# Patient Record
Sex: FEMALE | Race: WHITE | NOT HISPANIC OR LATINO | Employment: OTHER | URBAN - METROPOLITAN AREA
[De-identification: names, ages, dates, MRNs, and addresses within clinical notes are randomized per-mention and may not be internally consistent; named-entity substitution may affect disease eponyms.]

---

## 2020-01-01 ENCOUNTER — APPOINTMENT (INPATIENT)
Dept: ULTRASOUND IMAGING | Facility: HOSPITAL | Age: 75
DRG: 291 | End: 2020-01-01
Payer: MEDICARE

## 2020-01-01 ENCOUNTER — PATIENT OUTREACH (OUTPATIENT)
Dept: CASE MANAGEMENT | Facility: OTHER | Age: 75
End: 2020-01-01

## 2020-01-01 ENCOUNTER — TELEPHONE (OUTPATIENT)
Dept: HEMATOLOGY ONCOLOGY | Facility: CLINIC | Age: 75
End: 2020-01-01

## 2020-01-01 ENCOUNTER — HOSPITAL ENCOUNTER (INPATIENT)
Facility: HOSPITAL | Age: 75
LOS: 8 days | Discharge: HOME HEALTH | DRG: 280 | End: 2020-05-24
Attending: EMERGENCY MEDICINE | Admitting: STUDENT IN AN ORGANIZED HEALTH CARE EDUCATION/TRAINING PROGRAM
Payer: MEDICARE

## 2020-01-01 ENCOUNTER — APPOINTMENT (EMERGENCY)
Dept: RADIOLOGY | Facility: HOSPITAL | Age: 75
End: 2020-01-01
Payer: MEDICARE

## 2020-01-01 ENCOUNTER — APPOINTMENT (INPATIENT)
Dept: NON INVASIVE DIAGNOSTICS | Facility: HOSPITAL | Age: 75
DRG: 291 | End: 2020-01-01
Payer: MEDICARE

## 2020-01-01 ENCOUNTER — APPOINTMENT (INPATIENT)
Dept: RADIOLOGY | Facility: HOSPITAL | Age: 75
DRG: 291 | End: 2020-01-01
Payer: MEDICARE

## 2020-01-01 ENCOUNTER — APPOINTMENT (EMERGENCY)
Dept: RADIOLOGY | Facility: HOSPITAL | Age: 75
DRG: 291 | End: 2020-01-01
Payer: MEDICARE

## 2020-01-01 ENCOUNTER — PATIENT OUTREACH (OUTPATIENT)
Dept: CASE MANAGEMENT | Facility: HOSPITAL | Age: 75
End: 2020-01-01

## 2020-01-01 ENCOUNTER — APPOINTMENT (INPATIENT)
Dept: CT IMAGING | Facility: HOSPITAL | Age: 75
DRG: 291 | End: 2020-01-01
Payer: MEDICARE

## 2020-01-01 ENCOUNTER — APPOINTMENT (EMERGENCY)
Dept: RADIOLOGY | Facility: HOSPITAL | Age: 75
DRG: 280 | End: 2020-01-01
Payer: MEDICARE

## 2020-01-01 ENCOUNTER — APPOINTMENT (EMERGENCY)
Dept: CT IMAGING | Facility: HOSPITAL | Age: 75
DRG: 280 | End: 2020-01-01
Payer: MEDICARE

## 2020-01-01 ENCOUNTER — APPOINTMENT (OUTPATIENT)
Dept: RADIOLOGY | Facility: HOSPITAL | Age: 75
End: 2020-01-01
Payer: MEDICARE

## 2020-01-01 ENCOUNTER — TELEPHONE (OUTPATIENT)
Dept: FAMILY MEDICINE CLINIC | Facility: CLINIC | Age: 75
End: 2020-01-01

## 2020-01-01 ENCOUNTER — APPOINTMENT (INPATIENT)
Dept: RADIOLOGY | Facility: HOSPITAL | Age: 75
DRG: 280 | End: 2020-01-01
Payer: MEDICARE

## 2020-01-01 ENCOUNTER — APPOINTMENT (INPATIENT)
Dept: NON INVASIVE DIAGNOSTICS | Facility: HOSPITAL | Age: 75
DRG: 280 | End: 2020-01-01
Payer: MEDICARE

## 2020-01-01 ENCOUNTER — HOSPITAL ENCOUNTER (INPATIENT)
Facility: HOSPITAL | Age: 75
LOS: 11 days | DRG: 291 | End: 2020-07-15
Attending: EMERGENCY MEDICINE | Admitting: HOSPITALIST
Payer: MEDICARE

## 2020-01-01 ENCOUNTER — HOSPITAL ENCOUNTER (INPATIENT)
Facility: HOSPITAL | Age: 75
LOS: 15 days | Discharge: HOME HEALTH | DRG: 291 | End: 2020-03-31
Attending: FAMILY MEDICINE | Admitting: FAMILY MEDICINE
Payer: MEDICARE

## 2020-01-01 ENCOUNTER — HOSPITAL ENCOUNTER (EMERGENCY)
Facility: HOSPITAL | Age: 75
Discharge: HOME | End: 2020-04-26
Attending: EMERGENCY MEDICINE | Admitting: EMERGENCY MEDICINE
Payer: MEDICARE

## 2020-01-01 ENCOUNTER — HOSPITAL ENCOUNTER (INPATIENT)
Facility: HOSPITAL | Age: 75
LOS: 6 days | Discharge: NURSING, REHAB OR LONG-TERM CARE | DRG: 291 | End: 2020-03-16
Attending: EMERGENCY MEDICINE | Admitting: FAMILY MEDICINE
Payer: MEDICARE

## 2020-01-01 VITALS
RESPIRATION RATE: 18 BRPM | DIASTOLIC BLOOD PRESSURE: 59 MMHG | TEMPERATURE: 97.2 F | WEIGHT: 243.61 LBS | TEMPERATURE: 97.2 F | SYSTOLIC BLOOD PRESSURE: 130 MMHG | SYSTOLIC BLOOD PRESSURE: 136 MMHG | OXYGEN SATURATION: 95 % | DIASTOLIC BLOOD PRESSURE: 72 MMHG | BODY MASS INDEX: 47.7 KG/M2 | HEIGHT: 61 IN | BODY MASS INDEX: 44.83 KG/M2 | HEART RATE: 80 BPM | RESPIRATION RATE: 19 BRPM | WEIGHT: 252.65 LBS | OXYGEN SATURATION: 92 % | HEART RATE: 59 BPM | HEIGHT: 62 IN

## 2020-01-01 VITALS
WEIGHT: 269.84 LBS | HEART RATE: 84 BPM | DIASTOLIC BLOOD PRESSURE: 65 MMHG | HEIGHT: 61 IN | RESPIRATION RATE: 23 BRPM | OXYGEN SATURATION: 89 % | SYSTOLIC BLOOD PRESSURE: 139 MMHG | BODY MASS INDEX: 50.95 KG/M2 | TEMPERATURE: 97.8 F

## 2020-01-01 VITALS
WEIGHT: 243.7 LBS | OXYGEN SATURATION: 97 % | BODY MASS INDEX: 44.57 KG/M2 | TEMPERATURE: 98.4 F | DIASTOLIC BLOOD PRESSURE: 71 MMHG | HEART RATE: 89 BPM | RESPIRATION RATE: 18 BRPM | SYSTOLIC BLOOD PRESSURE: 146 MMHG

## 2020-01-01 VITALS
DIASTOLIC BLOOD PRESSURE: 60 MMHG | TEMPERATURE: 96.5 F | RESPIRATION RATE: 16 BRPM | HEIGHT: 62 IN | SYSTOLIC BLOOD PRESSURE: 132 MMHG | HEART RATE: 65 BPM | OXYGEN SATURATION: 95 % | BODY MASS INDEX: 43.86 KG/M2 | WEIGHT: 238.32 LBS

## 2020-01-01 DIAGNOSIS — I50.9 CHF (CONGESTIVE HEART FAILURE) (HCC): Primary | ICD-10-CM

## 2020-01-01 DIAGNOSIS — I95.9 HYPOTENSION: ICD-10-CM

## 2020-01-01 DIAGNOSIS — R09.02 HYPOXIA: ICD-10-CM

## 2020-01-01 DIAGNOSIS — E87.2 LACTIC ACIDOSIS: ICD-10-CM

## 2020-01-01 DIAGNOSIS — J96.22 ACUTE ON CHRONIC RESPIRATORY FAILURE WITH HYPOXIA AND HYPERCAPNIA (HCC): ICD-10-CM

## 2020-01-01 DIAGNOSIS — D69.6 THROMBOCYTOPENIA (HCC): ICD-10-CM

## 2020-01-01 DIAGNOSIS — I50.9 ACUTE EXACERBATION OF CHF (CONGESTIVE HEART FAILURE) (HCC): ICD-10-CM

## 2020-01-01 DIAGNOSIS — R65.10 SIRS (SYSTEMIC INFLAMMATORY RESPONSE SYNDROME) (HCC): ICD-10-CM

## 2020-01-01 DIAGNOSIS — I50.31 ACUTE DIASTOLIC HEART FAILURE (HCC): ICD-10-CM

## 2020-01-01 DIAGNOSIS — M79.605 BILATERAL LEG PAIN: Primary | ICD-10-CM

## 2020-01-01 DIAGNOSIS — R77.8 ELEVATED TROPONIN I LEVEL: ICD-10-CM

## 2020-01-01 DIAGNOSIS — R06.03 ACUTE RESPIRATORY DISTRESS: Primary | ICD-10-CM

## 2020-01-01 DIAGNOSIS — J96.21 ACUTE ON CHRONIC RESPIRATORY FAILURE WITH HYPOXIA AND HYPERCAPNIA (HCC): ICD-10-CM

## 2020-01-01 DIAGNOSIS — I50.33 ACUTE ON CHRONIC DIASTOLIC HEART FAILURE (HCC): ICD-10-CM

## 2020-01-01 DIAGNOSIS — I50.9 ACUTE CHF (CONGESTIVE HEART FAILURE) (HCC): ICD-10-CM

## 2020-01-01 DIAGNOSIS — J96.21 ACUTE ON CHRONIC RESPIRATORY FAILURE WITH HYPOXIA (HCC): ICD-10-CM

## 2020-01-01 DIAGNOSIS — E88.09 HYPOALBUMINEMIA: ICD-10-CM

## 2020-01-01 DIAGNOSIS — M79.604 BILATERAL LEG PAIN: Primary | ICD-10-CM

## 2020-01-01 DIAGNOSIS — K76.9 LIVER DISEASE: ICD-10-CM

## 2020-01-01 DIAGNOSIS — E53.8 VITAMIN B12 DEFICIENCY: ICD-10-CM

## 2020-01-01 DIAGNOSIS — R57.9 SHOCK (HCC): ICD-10-CM

## 2020-01-01 DIAGNOSIS — N18.9 CKD (CHRONIC KIDNEY DISEASE): ICD-10-CM

## 2020-01-01 DIAGNOSIS — L60.2 ONYCHOGRYPHOSIS: Primary | ICD-10-CM

## 2020-01-01 DIAGNOSIS — J90 PLEURAL EFFUSION: ICD-10-CM

## 2020-01-01 DIAGNOSIS — R06.02 SOB (SHORTNESS OF BREATH): ICD-10-CM

## 2020-01-01 DIAGNOSIS — D64.9 ACUTE ANEMIA: ICD-10-CM

## 2020-01-01 DIAGNOSIS — R77.8 ELEVATED TROPONIN: ICD-10-CM

## 2020-01-01 DIAGNOSIS — I48.91 ATRIAL FIBRILLATION (HCC): Primary | ICD-10-CM

## 2020-01-01 DIAGNOSIS — I50.33 ACUTE ON CHRONIC DIASTOLIC HEART FAILURE (HCC): Primary | ICD-10-CM

## 2020-01-01 LAB
ABO GROUP BLD BPU: NORMAL
ABO GROUP BLD: NORMAL
ABO GROUP BLD: NORMAL
AFP-TM SERPL-MCNC: 2.6 NG/ML (ref 0.5–8)
ALBUMIN SERPL BCP-MCNC: 2.2 G/DL (ref 3.5–5)
ALBUMIN SERPL BCP-MCNC: 2.4 G/DL (ref 3.5–5)
ALBUMIN SERPL BCP-MCNC: 2.6 G/DL (ref 3.5–5)
ALBUMIN SERPL BCP-MCNC: 2.7 G/DL (ref 3–5.2)
ALBUMIN SERPL BCP-MCNC: 2.8 G/DL (ref 3–5.2)
ALBUMIN SERPL BCP-MCNC: 2.8 G/DL (ref 3–5.2)
ALBUMIN SERPL BCP-MCNC: 2.9 G/DL (ref 3–5.2)
ALBUMIN SERPL BCP-MCNC: 3.1 G/DL (ref 3–5.2)
ALBUMIN SERPL BCP-MCNC: 3.2 G/DL (ref 3–5.2)
ALBUMIN SERPL ELPH-MCNC: 2.94 G/DL (ref 3.5–5)
ALBUMIN SERPL ELPH-MCNC: 51.6 % (ref 52–65)
ALP SERPL-CCNC: 105 U/L (ref 43–122)
ALP SERPL-CCNC: 116 U/L (ref 46–116)
ALP SERPL-CCNC: 122 U/L (ref 46–116)
ALP SERPL-CCNC: 124 U/L (ref 43–122)
ALP SERPL-CCNC: 124 U/L (ref 43–122)
ALP SERPL-CCNC: 127 U/L (ref 43–122)
ALP SERPL-CCNC: 130 U/L (ref 46–116)
ALP SERPL-CCNC: 161 U/L (ref 43–122)
ALP SERPL-CCNC: 166 U/L (ref 46–116)
ALP SERPL-CCNC: 170 U/L (ref 46–116)
ALPHA1 GLOB SERPL ELPH-MCNC: 0.31 G/DL (ref 0.1–0.4)
ALPHA1 GLOB SERPL ELPH-MCNC: 5.5 % (ref 2.5–5)
ALPHA2 GLOB SERPL ELPH-MCNC: 0.46 G/DL (ref 0.4–1.2)
ALPHA2 GLOB SERPL ELPH-MCNC: 8.1 % (ref 7–13)
ALT SERPL W P-5'-P-CCNC: 16 U/L (ref 12–78)
ALT SERPL W P-5'-P-CCNC: 19 U/L (ref 12–78)
ALT SERPL W P-5'-P-CCNC: 21 U/L (ref 9–52)
ALT SERPL W P-5'-P-CCNC: 28 U/L (ref 12–78)
ALT SERPL W P-5'-P-CCNC: 30 U/L (ref 9–52)
ALT SERPL W P-5'-P-CCNC: 31 U/L (ref 12–78)
ALT SERPL W P-5'-P-CCNC: 32 U/L (ref 9–52)
ALT SERPL W P-5'-P-CCNC: 34 U/L (ref 9–52)
ALT SERPL W P-5'-P-CCNC: 38 U/L (ref 9–52)
ALT SERPL W P-5'-P-CCNC: 8 U/L (ref 12–78)
ANION GAP SERPL CALCULATED.3IONS-SCNC: -1 MMOL/L (ref 4–13)
ANION GAP SERPL CALCULATED.3IONS-SCNC: 1 MMOL/L (ref 4–13)
ANION GAP SERPL CALCULATED.3IONS-SCNC: 2 MMOL/L (ref 4–13)
ANION GAP SERPL CALCULATED.3IONS-SCNC: 2 MMOL/L (ref 4–13)
ANION GAP SERPL CALCULATED.3IONS-SCNC: 2 MMOL/L (ref 5–14)
ANION GAP SERPL CALCULATED.3IONS-SCNC: 3 MMOL/L (ref 4–13)
ANION GAP SERPL CALCULATED.3IONS-SCNC: 3 MMOL/L (ref 5–14)
ANION GAP SERPL CALCULATED.3IONS-SCNC: 4 MMOL/L (ref 4–13)
ANION GAP SERPL CALCULATED.3IONS-SCNC: 4 MMOL/L (ref 5–14)
ANION GAP SERPL CALCULATED.3IONS-SCNC: 4 MMOL/L (ref 5–14)
ANION GAP SERPL CALCULATED.3IONS-SCNC: 5 MMOL/L (ref 4–13)
ANION GAP SERPL CALCULATED.3IONS-SCNC: 5 MMOL/L (ref 4–13)
ANION GAP SERPL CALCULATED.3IONS-SCNC: 5 MMOL/L (ref 5–14)
ANION GAP SERPL CALCULATED.3IONS-SCNC: 6 MMOL/L (ref 4–13)
ANION GAP SERPL CALCULATED.3IONS-SCNC: 6 MMOL/L (ref 5–14)
ANION GAP SERPL CALCULATED.3IONS-SCNC: 6 MMOL/L (ref 5–14)
ANION GAP SERPL CALCULATED.3IONS-SCNC: 7 MMOL/L (ref 4–13)
ANION GAP SERPL CALCULATED.3IONS-SCNC: 8 MMOL/L (ref 5–14)
ANISOCYTOSIS BLD QL SMEAR: PRESENT
ANISOCYTOSIS BLD QL SMEAR: PRESENT
APTT PPP: 30 SECONDS (ref 23–37)
APTT PPP: 34 SECONDS (ref 23–37)
ARTERIAL PATENCY WRIST A: YES
ARTERIAL PATENCY WRIST A: YES
AST SERPL W P-5'-P-CCNC: 21 U/L (ref 5–45)
AST SERPL W P-5'-P-CCNC: 22 U/L (ref 5–45)
AST SERPL W P-5'-P-CCNC: 35 U/L (ref 14–36)
AST SERPL W P-5'-P-CCNC: 38 U/L (ref 5–45)
AST SERPL W P-5'-P-CCNC: 40 U/L (ref 14–36)
AST SERPL W P-5'-P-CCNC: 40 U/L (ref 14–36)
AST SERPL W P-5'-P-CCNC: 48 U/L (ref 14–36)
AST SERPL W P-5'-P-CCNC: 48 U/L (ref 5–45)
AST SERPL W P-5'-P-CCNC: 70 U/L (ref 14–36)
AST SERPL W P-5'-P-CCNC: 76 U/L (ref 5–45)
ATRIAL RATE: 104 BPM
ATRIAL RATE: 107 BPM
ATRIAL RATE: 111 BPM
ATRIAL RATE: 170 BPM
ATRIAL RATE: 326 BPM
ATRIAL RATE: 82 BPM
ATRIAL RATE: 85 BPM
ATRIAL RATE: 86 BPM
ATRIAL RATE: 87 BPM
ATRIAL RATE: 90 BPM
B2 MICROGLOB SERPL-MCNC: 4.5 MG/L (ref 0.6–2.4)
BACTERIA BLD CULT: NORMAL
BACTERIA UR CULT: ABNORMAL
BACTERIA UR QL AUTO: ABNORMAL /HPF
BASE EX.OXY STD BLDV CALC-SCNC: 91.9 %
BASE EXCESS BLDA CALC-SCNC: 10.5 MMOL/L
BASE EXCESS BLDA CALC-SCNC: 7 MMOL/L
BASE EXCESS BLDV CALC-SCNC: 16.6 MMOL/L (ref -2.1–2.1)
BASOPHILS # BLD AUTO: 0 THOUSANDS/ΜL (ref 0–0.1)
BASOPHILS # BLD AUTO: 0.02 THOUSANDS/ΜL (ref 0–0.1)
BASOPHILS # BLD AUTO: 0.03 THOUSANDS/ΜL (ref 0–0.1)
BASOPHILS # BLD AUTO: 0.07 THOUSANDS/ΜL (ref 0–0.1)
BASOPHILS # BLD AUTO: 0.1 THOUSANDS/ΜL (ref 0–0.1)
BASOPHILS # BLD AUTO: 0.1 THOUSANDS/ΜL (ref 0–0.1)
BASOPHILS NFR BLD AUTO: 0 % (ref 0–1)
BASOPHILS NFR BLD AUTO: 1 % (ref 0–1)
BETA GLOB ABNORMAL SERPL ELPH-MCNC: 0.35 G/DL (ref 0.4–0.8)
BETA1 GLOB SERPL ELPH-MCNC: 6.1 % (ref 5–13)
BETA2 GLOB SERPL ELPH-MCNC: 5.7 % (ref 2–8)
BETA2+GAMMA GLOB SERPL ELPH-MCNC: 0.32 G/DL (ref 0.2–0.5)
BILIRUB SERPL-MCNC: 0.72 MG/DL (ref 0.2–1)
BILIRUB SERPL-MCNC: 0.8 MG/DL
BILIRUB SERPL-MCNC: 0.95 MG/DL (ref 0.2–1)
BILIRUB SERPL-MCNC: 1.14 MG/DL (ref 0.2–1)
BILIRUB SERPL-MCNC: 1.2 MG/DL
BILIRUB SERPL-MCNC: 1.24 MG/DL (ref 0.2–1)
BILIRUB SERPL-MCNC: 1.3 MG/DL
BILIRUB SERPL-MCNC: 1.4 MG/DL
BILIRUB SERPL-MCNC: 1.5 MG/DL
BILIRUB SERPL-MCNC: 1.63 MG/DL (ref 0.2–1)
BILIRUB UR QL STRIP: NEGATIVE
BLD GP AB SCN SERPL QL: NEGATIVE
BLD SMEAR INTERP: NORMAL
BPU ID: NORMAL
BUN SERPL-MCNC: 13 MG/DL (ref 5–25)
BUN SERPL-MCNC: 13 MG/DL (ref 5–25)
BUN SERPL-MCNC: 14 MG/DL (ref 5–25)
BUN SERPL-MCNC: 14 MG/DL (ref 5–25)
BUN SERPL-MCNC: 15 MG/DL (ref 5–25)
BUN SERPL-MCNC: 16 MG/DL (ref 5–25)
BUN SERPL-MCNC: 17 MG/DL (ref 5–25)
BUN SERPL-MCNC: 18 MG/DL (ref 5–25)
BUN SERPL-MCNC: 19 MG/DL (ref 5–25)
BUN SERPL-MCNC: 21 MG/DL (ref 5–25)
BUN SERPL-MCNC: 22 MG/DL (ref 5–25)
BUN SERPL-MCNC: 23 MG/DL (ref 5–25)
BUN SERPL-MCNC: 24 MG/DL (ref 5–25)
BUN SERPL-MCNC: 24 MG/DL (ref 5–25)
BUN SERPL-MCNC: 25 MG/DL (ref 5–25)
BUN SERPL-MCNC: 26 MG/DL (ref 5–25)
BUN SERPL-MCNC: 27 MG/DL (ref 5–25)
BUN SERPL-MCNC: 27 MG/DL (ref 5–25)
BUN SERPL-MCNC: 28 MG/DL (ref 5–25)
BUN SERPL-MCNC: 29 MG/DL (ref 5–25)
BUN SERPL-MCNC: 30 MG/DL (ref 5–25)
BUN SERPL-MCNC: 31 MG/DL (ref 5–25)
BUN SERPL-MCNC: 36 MG/DL (ref 5–25)
CALCIUM SERPL-MCNC: 8.3 MG/DL (ref 8.3–10.1)
CALCIUM SERPL-MCNC: 8.3 MG/DL (ref 8.3–10.1)
CALCIUM SERPL-MCNC: 8.5 MG/DL (ref 8.3–10.1)
CALCIUM SERPL-MCNC: 8.5 MG/DL (ref 8.3–10.1)
CALCIUM SERPL-MCNC: 8.6 MG/DL (ref 8.3–10.1)
CALCIUM SERPL-MCNC: 8.6 MG/DL (ref 8.3–10.1)
CALCIUM SERPL-MCNC: 8.7 MG/DL (ref 8.3–10.1)
CALCIUM SERPL-MCNC: 8.7 MG/DL (ref 8.4–10.2)
CALCIUM SERPL-MCNC: 8.8 MG/DL (ref 8.3–10.1)
CALCIUM SERPL-MCNC: 8.9 MG/DL (ref 8.3–10.1)
CALCIUM SERPL-MCNC: 8.9 MG/DL (ref 8.4–10.2)
CALCIUM SERPL-MCNC: 9 MG/DL (ref 8.4–10.2)
CALCIUM SERPL-MCNC: 9 MG/DL (ref 8.4–10.2)
CALCIUM SERPL-MCNC: 9.1 MG/DL (ref 8.3–10.1)
CALCIUM SERPL-MCNC: 9.1 MG/DL (ref 8.3–10.1)
CALCIUM SERPL-MCNC: 9.1 MG/DL (ref 8.4–10.2)
CALCIUM SERPL-MCNC: 9.1 MG/DL (ref 8.4–10.2)
CALCIUM SERPL-MCNC: 9.2 MG/DL (ref 8.4–10.2)
CALCIUM SERPL-MCNC: 9.3 MG/DL (ref 8.3–10.1)
CALCIUM SERPL-MCNC: 9.3 MG/DL (ref 8.4–10.2)
CALCIUM SERPL-MCNC: 9.3 MG/DL (ref 8.4–10.2)
CALCIUM SERPL-MCNC: 9.4 MG/DL (ref 8.4–10.2)
CALCIUM SERPL-MCNC: 9.6 MG/DL (ref 8.3–10.1)
CALCIUM SERPL-MCNC: 9.7 MG/DL (ref 8.3–10.1)
CCP IGA+IGG SERPL IA-ACNC: 6 UNITS (ref 0–19)
CHLORIDE SERPL-SCNC: 101 MMOL/L (ref 100–108)
CHLORIDE SERPL-SCNC: 101 MMOL/L (ref 100–108)
CHLORIDE SERPL-SCNC: 101 MMOL/L (ref 97–108)
CHLORIDE SERPL-SCNC: 102 MMOL/L (ref 100–108)
CHLORIDE SERPL-SCNC: 102 MMOL/L (ref 97–108)
CHLORIDE SERPL-SCNC: 102 MMOL/L (ref 97–108)
CHLORIDE SERPL-SCNC: 103 MMOL/L (ref 100–108)
CHLORIDE SERPL-SCNC: 103 MMOL/L (ref 97–108)
CHLORIDE SERPL-SCNC: 104 MMOL/L (ref 100–108)
CHLORIDE SERPL-SCNC: 105 MMOL/L (ref 100–108)
CHLORIDE SERPL-SCNC: 106 MMOL/L (ref 100–108)
CHLORIDE SERPL-SCNC: 107 MMOL/L (ref 100–108)
CHLORIDE SERPL-SCNC: 108 MMOL/L (ref 100–108)
CHLORIDE SERPL-SCNC: 109 MMOL/L (ref 100–108)
CHLORIDE SERPL-SCNC: 94 MMOL/L (ref 97–108)
CHLORIDE SERPL-SCNC: 95 MMOL/L (ref 97–108)
CHLORIDE SERPL-SCNC: 96 MMOL/L (ref 97–108)
CHLORIDE SERPL-SCNC: 97 MMOL/L (ref 97–108)
CHLORIDE SERPL-SCNC: 99 MMOL/L (ref 97–108)
CHOLEST SERPL-MCNC: 130 MG/DL
CLARITY UR: ABNORMAL
CO2 SERPL-SCNC: 26 MMOL/L (ref 21–32)
CO2 SERPL-SCNC: 27 MMOL/L (ref 22–30)
CO2 SERPL-SCNC: 29 MMOL/L (ref 21–32)
CO2 SERPL-SCNC: 29 MMOL/L (ref 21–32)
CO2 SERPL-SCNC: 29 MMOL/L (ref 22–30)
CO2 SERPL-SCNC: 30 MMOL/L (ref 22–30)
CO2 SERPL-SCNC: 30 MMOL/L (ref 22–30)
CO2 SERPL-SCNC: 31 MMOL/L (ref 21–32)
CO2 SERPL-SCNC: 31 MMOL/L (ref 21–32)
CO2 SERPL-SCNC: 31 MMOL/L (ref 22–30)
CO2 SERPL-SCNC: 31 MMOL/L (ref 22–30)
CO2 SERPL-SCNC: 32 MMOL/L (ref 21–32)
CO2 SERPL-SCNC: 32 MMOL/L (ref 21–32)
CO2 SERPL-SCNC: 32 MMOL/L (ref 22–30)
CO2 SERPL-SCNC: 33 MMOL/L (ref 21–32)
CO2 SERPL-SCNC: 34 MMOL/L (ref 21–32)
CO2 SERPL-SCNC: 34 MMOL/L (ref 22–30)
CO2 SERPL-SCNC: 35 MMOL/L (ref 21–32)
CO2 SERPL-SCNC: 36 MMOL/L (ref 22–30)
CO2 SERPL-SCNC: 37 MMOL/L (ref 21–32)
CO2 SERPL-SCNC: 37 MMOL/L (ref 21–32)
CO2 SERPL-SCNC: 37 MMOL/L (ref 22–30)
CO2 SERPL-SCNC: 37 MMOL/L (ref 22–30)
CO2 SERPL-SCNC: 39 MMOL/L (ref 21–32)
CO2 SERPL-SCNC: 39 MMOL/L (ref 22–30)
CO2 SERPL-SCNC: 40 MMOL/L (ref 21–32)
CO2 SERPL-SCNC: 41 MMOL/L (ref 21–32)
COLOR UR: YELLOW
COLOR, POC: YELLOW
CREAT SERPL-MCNC: 1.05 MG/DL (ref 0.6–1.2)
CREAT SERPL-MCNC: 1.08 MG/DL (ref 0.6–1.2)
CREAT SERPL-MCNC: 1.17 MG/DL (ref 0.6–1.2)
CREAT SERPL-MCNC: 1.22 MG/DL (ref 0.6–1.2)
CREAT SERPL-MCNC: 1.22 MG/DL (ref 0.6–1.2)
CREAT SERPL-MCNC: 1.27 MG/DL (ref 0.6–1.2)
CREAT SERPL-MCNC: 1.28 MG/DL (ref 0.6–1.2)
CREAT SERPL-MCNC: 1.29 MG/DL (ref 0.6–1.2)
CREAT SERPL-MCNC: 1.32 MG/DL (ref 0.6–1.2)
CREAT SERPL-MCNC: 1.34 MG/DL (ref 0.6–1.2)
CREAT SERPL-MCNC: 1.34 MG/DL (ref 0.6–1.3)
CREAT SERPL-MCNC: 1.36 MG/DL (ref 0.6–1.2)
CREAT SERPL-MCNC: 1.38 MG/DL (ref 0.6–1.3)
CREAT SERPL-MCNC: 1.4 MG/DL (ref 0.6–1.2)
CREAT SERPL-MCNC: 1.43 MG/DL (ref 0.6–1.3)
CREAT SERPL-MCNC: 1.47 MG/DL (ref 0.6–1.3)
CREAT SERPL-MCNC: 1.49 MG/DL (ref 0.6–1.3)
CREAT SERPL-MCNC: 1.53 MG/DL (ref 0.6–1.3)
CREAT SERPL-MCNC: 1.57 MG/DL (ref 0.6–1.3)
CREAT SERPL-MCNC: 1.57 MG/DL (ref 0.6–1.3)
CREAT SERPL-MCNC: 1.59 MG/DL (ref 0.6–1.3)
CREAT SERPL-MCNC: 1.6 MG/DL (ref 0.6–1.3)
CREAT SERPL-MCNC: 1.64 MG/DL (ref 0.6–1.3)
CREAT SERPL-MCNC: 1.66 MG/DL (ref 0.6–1.3)
CREAT SERPL-MCNC: 1.67 MG/DL (ref 0.6–1.3)
CREAT SERPL-MCNC: 1.69 MG/DL (ref 0.6–1.3)
CREAT SERPL-MCNC: 1.69 MG/DL (ref 0.6–1.3)
CREAT SERPL-MCNC: 1.7 MG/DL (ref 0.6–1.3)
CREAT SERPL-MCNC: 1.7 MG/DL (ref 0.6–1.3)
CREAT SERPL-MCNC: 1.71 MG/DL (ref 0.6–1.3)
CREAT SERPL-MCNC: 1.76 MG/DL (ref 0.6–1.3)
CREAT SERPL-MCNC: 1.79 MG/DL (ref 0.6–1.3)
CREAT SERPL-MCNC: 1.79 MG/DL (ref 0.6–1.3)
CREAT SERPL-MCNC: 1.88 MG/DL (ref 0.6–1.3)
CROSSMATCH: NORMAL
CRP SERPL QL: 9.6 MG/L
CRYOGLOB RF SER-ACNC: ABNORMAL [IU]/ML
DAT POLY-SP REAG RBC QL: NEGATIVE
DIGOXIN SERPL-MCNC: 0.6 NG/ML (ref 0.8–2)
EOSINOPHIL # BLD AUTO: 0 THOUSAND/ΜL (ref 0–0.4)
EOSINOPHIL # BLD AUTO: 0 THOUSAND/ΜL (ref 0–0.4)
EOSINOPHIL # BLD AUTO: 0.03 THOUSAND/ΜL (ref 0–0.61)
EOSINOPHIL # BLD AUTO: 0.04 THOUSAND/ΜL (ref 0–0.61)
EOSINOPHIL # BLD AUTO: 0.05 THOUSAND/ΜL (ref 0–0.61)
EOSINOPHIL # BLD AUTO: 0.05 THOUSAND/ΜL (ref 0–0.61)
EOSINOPHIL # BLD AUTO: 0.06 THOUSAND/ΜL (ref 0–0.61)
EOSINOPHIL # BLD AUTO: 0.07 THOUSAND/ΜL (ref 0–0.61)
EOSINOPHIL # BLD AUTO: 0.08 THOUSAND/ΜL (ref 0–0.61)
EOSINOPHIL # BLD AUTO: 0.1 THOUSAND/ΜL (ref 0–0.4)
EOSINOPHIL # BLD AUTO: 0.11 THOUSAND/ΜL (ref 0–0.61)
EOSINOPHIL # BLD AUTO: 0.16 THOUSAND/ΜL (ref 0–0.61)
EOSINOPHIL # BLD AUTO: 0.23 THOUSAND/ΜL (ref 0–0.61)
EOSINOPHIL NFR BLD AUTO: 1 % (ref 0–6)
EOSINOPHIL NFR BLD AUTO: 2 % (ref 0–6)
EOSINOPHIL NFR BLD AUTO: 3 % (ref 0–6)
EPO SERPL-ACNC: 63.3 MIU/ML (ref 2.6–18.5)
ERYTHROCYTE [DISTWIDTH] IN BLOOD BY AUTOMATED COUNT: 16.4 % (ref 11.6–15.1)
ERYTHROCYTE [DISTWIDTH] IN BLOOD BY AUTOMATED COUNT: 16.6 % (ref 11.6–15.1)
ERYTHROCYTE [DISTWIDTH] IN BLOOD BY AUTOMATED COUNT: 16.7 % (ref 11.6–15.1)
ERYTHROCYTE [DISTWIDTH] IN BLOOD BY AUTOMATED COUNT: 16.7 % (ref 11.6–15.1)
ERYTHROCYTE [DISTWIDTH] IN BLOOD BY AUTOMATED COUNT: 16.8 % (ref 11.6–15.1)
ERYTHROCYTE [DISTWIDTH] IN BLOOD BY AUTOMATED COUNT: 16.8 % (ref 11.6–15.1)
ERYTHROCYTE [DISTWIDTH] IN BLOOD BY AUTOMATED COUNT: 16.9 % (ref 11.6–15.1)
ERYTHROCYTE [DISTWIDTH] IN BLOOD BY AUTOMATED COUNT: 17.1 % (ref 11.6–15.1)
ERYTHROCYTE [DISTWIDTH] IN BLOOD BY AUTOMATED COUNT: 17.7 % (ref 11.6–15.1)
ERYTHROCYTE [DISTWIDTH] IN BLOOD BY AUTOMATED COUNT: 17.7 % (ref 11.6–15.1)
ERYTHROCYTE [DISTWIDTH] IN BLOOD BY AUTOMATED COUNT: 17.9 % (ref 11.6–15.1)
ERYTHROCYTE [DISTWIDTH] IN BLOOD BY AUTOMATED COUNT: 17.9 % (ref 11.6–15.1)
ERYTHROCYTE [DISTWIDTH] IN BLOOD BY AUTOMATED COUNT: 18 % (ref 11.6–15.1)
ERYTHROCYTE [DISTWIDTH] IN BLOOD BY AUTOMATED COUNT: 18.1 % (ref 11.6–15.1)
ERYTHROCYTE [DISTWIDTH] IN BLOOD BY AUTOMATED COUNT: 18.2 % (ref 11.6–15.1)
ERYTHROCYTE [DISTWIDTH] IN BLOOD BY AUTOMATED COUNT: 18.2 % (ref 11.6–15.1)
ERYTHROCYTE [DISTWIDTH] IN BLOOD BY AUTOMATED COUNT: 18.3 %
ERYTHROCYTE [DISTWIDTH] IN BLOOD BY AUTOMATED COUNT: 18.3 %
ERYTHROCYTE [DISTWIDTH] IN BLOOD BY AUTOMATED COUNT: 18.3 % (ref 11.6–15.1)
ERYTHROCYTE [DISTWIDTH] IN BLOOD BY AUTOMATED COUNT: 18.4 %
ERYTHROCYTE [DISTWIDTH] IN BLOOD BY AUTOMATED COUNT: 18.4 %
ERYTHROCYTE [DISTWIDTH] IN BLOOD BY AUTOMATED COUNT: 18.5 %
ERYTHROCYTE [DISTWIDTH] IN BLOOD BY AUTOMATED COUNT: 18.7 %
ERYTHROCYTE [DISTWIDTH] IN BLOOD BY AUTOMATED COUNT: 18.7 %
ERYTHROCYTE [DISTWIDTH] IN BLOOD BY AUTOMATED COUNT: 18.8 %
ERYTHROCYTE [DISTWIDTH] IN BLOOD BY AUTOMATED COUNT: 22.9 %
ERYTHROCYTE [DISTWIDTH] IN BLOOD BY AUTOMATED COUNT: 23 %
ERYTHROCYTE [SEDIMENTATION RATE] IN BLOOD: 6 MM/HOUR (ref 1–20)
FERRITIN SERPL-MCNC: 14 NG/ML (ref 8–388)
FERRITIN SERPL-MCNC: 14 NG/ML (ref 8–388)
FERRITIN SERPL-MCNC: 45 NG/ML (ref 8–388)
FLUAV RNA NPH QL NAA+PROBE: NORMAL
FLUBV RNA NPH QL NAA+PROBE: NORMAL
FOLATE SERPL-MCNC: 19.6 NG/ML (ref 3.1–17.5)
FOLATE SERPL-MCNC: 6.5 NG/ML (ref 3.1–17.5)
GAMMA GLOB ABNORMAL SERPL ELPH-MCNC: 1.31 G/DL (ref 0.5–1.6)
GAMMA GLOB SERPL ELPH-MCNC: 23 % (ref 12–22)
GFR SERPL CREATININE-BSD FRML MDRD: 26 ML/MIN/1.73SQ M
GFR SERPL CREATININE-BSD FRML MDRD: 27 ML/MIN/1.73SQ M
GFR SERPL CREATININE-BSD FRML MDRD: 27 ML/MIN/1.73SQ M
GFR SERPL CREATININE-BSD FRML MDRD: 28 ML/MIN/1.73SQ M
GFR SERPL CREATININE-BSD FRML MDRD: 29 ML/MIN/1.73SQ M
GFR SERPL CREATININE-BSD FRML MDRD: 30 ML/MIN/1.73SQ M
GFR SERPL CREATININE-BSD FRML MDRD: 31 ML/MIN/1.73SQ M
GFR SERPL CREATININE-BSD FRML MDRD: 32 ML/MIN/1.73SQ M
GFR SERPL CREATININE-BSD FRML MDRD: 33 ML/MIN/1.73SQ M
GFR SERPL CREATININE-BSD FRML MDRD: 34 ML/MIN/1.73SQ M
GFR SERPL CREATININE-BSD FRML MDRD: 35 ML/MIN/1.73SQ M
GFR SERPL CREATININE-BSD FRML MDRD: 36 ML/MIN/1.73SQ M
GFR SERPL CREATININE-BSD FRML MDRD: 37 ML/MIN/1.73SQ M
GFR SERPL CREATININE-BSD FRML MDRD: 37 ML/MIN/1.73SQ M
GFR SERPL CREATININE-BSD FRML MDRD: 38 ML/MIN/1.73SQ M
GFR SERPL CREATININE-BSD FRML MDRD: 39 ML/MIN/1.73SQ M
GFR SERPL CREATININE-BSD FRML MDRD: 41 ML/MIN/1.73SQ M
GFR SERPL CREATININE-BSD FRML MDRD: 43 ML/MIN/1.73SQ M
GFR SERPL CREATININE-BSD FRML MDRD: 43 ML/MIN/1.73SQ M
GFR SERPL CREATININE-BSD FRML MDRD: 46 ML/MIN/1.73SQ M
GFR SERPL CREATININE-BSD FRML MDRD: 50 ML/MIN/1.73SQ M
GFR SERPL CREATININE-BSD FRML MDRD: 52 ML/MIN/1.73SQ M
GLUCOSE P FAST SERPL-MCNC: 72 MG/DL (ref 70–99)
GLUCOSE P FAST SERPL-MCNC: 80 MG/DL (ref 70–99)
GLUCOSE P FAST SERPL-MCNC: 86 MG/DL (ref 70–99)
GLUCOSE P FAST SERPL-MCNC: 87 MG/DL (ref 70–99)
GLUCOSE SERPL-MCNC: 103 MG/DL (ref 65–140)
GLUCOSE SERPL-MCNC: 104 MG/DL (ref 65–140)
GLUCOSE SERPL-MCNC: 105 MG/DL (ref 65–140)
GLUCOSE SERPL-MCNC: 105 MG/DL (ref 70–99)
GLUCOSE SERPL-MCNC: 106 MG/DL (ref 70–99)
GLUCOSE SERPL-MCNC: 107 MG/DL (ref 65–140)
GLUCOSE SERPL-MCNC: 112 MG/DL (ref 65–140)
GLUCOSE SERPL-MCNC: 113 MG/DL (ref 70–99)
GLUCOSE SERPL-MCNC: 114 MG/DL (ref 70–99)
GLUCOSE SERPL-MCNC: 125 MG/DL (ref 65–140)
GLUCOSE SERPL-MCNC: 140 MG/DL (ref 65–140)
GLUCOSE SERPL-MCNC: 72 MG/DL (ref 70–99)
GLUCOSE SERPL-MCNC: 79 MG/DL (ref 70–99)
GLUCOSE SERPL-MCNC: 80 MG/DL (ref 65–140)
GLUCOSE SERPL-MCNC: 80 MG/DL (ref 70–99)
GLUCOSE SERPL-MCNC: 84 MG/DL (ref 65–140)
GLUCOSE SERPL-MCNC: 85 MG/DL (ref 65–140)
GLUCOSE SERPL-MCNC: 86 MG/DL (ref 65–140)
GLUCOSE SERPL-MCNC: 86 MG/DL (ref 65–140)
GLUCOSE SERPL-MCNC: 86 MG/DL (ref 70–99)
GLUCOSE SERPL-MCNC: 87 MG/DL (ref 65–140)
GLUCOSE SERPL-MCNC: 87 MG/DL (ref 70–99)
GLUCOSE SERPL-MCNC: 88 MG/DL (ref 65–140)
GLUCOSE SERPL-MCNC: 88 MG/DL (ref 70–99)
GLUCOSE SERPL-MCNC: 89 MG/DL (ref 65–140)
GLUCOSE SERPL-MCNC: 89 MG/DL (ref 65–140)
GLUCOSE SERPL-MCNC: 89 MG/DL (ref 70–99)
GLUCOSE SERPL-MCNC: 90 MG/DL (ref 65–140)
GLUCOSE SERPL-MCNC: 91 MG/DL (ref 65–140)
GLUCOSE SERPL-MCNC: 91 MG/DL (ref 65–140)
GLUCOSE SERPL-MCNC: 92 MG/DL (ref 65–140)
GLUCOSE SERPL-MCNC: 93 MG/DL (ref 65–140)
GLUCOSE SERPL-MCNC: 95 MG/DL (ref 70–99)
GLUCOSE SERPL-MCNC: 98 MG/DL (ref 65–140)
GLUCOSE UR STRIP-MCNC: NEGATIVE MG/DL
HAPTOGLOB SERPL-MCNC: 75 MG/DL (ref 42–346)
HBV CORE AB SER QL: NORMAL
HBV CORE IGM SER QL: NORMAL
HBV SURFACE AG SER QL: NORMAL
HCO3 BLDA-SCNC: 34.4 MMOL/L (ref 22–28)
HCO3 BLDA-SCNC: 38.3 MMOL/L (ref 22–28)
HCO3 BLDV-SCNC: 40.3 MMOL/L (ref 23–28)
HCT VFR BLD AUTO: 24.7 % (ref 34.8–46.1)
HCT VFR BLD AUTO: 24.7 % (ref 34.8–46.1)
HCT VFR BLD AUTO: 25.9 % (ref 36–46)
HCT VFR BLD AUTO: 26.2 % (ref 36–46)
HCT VFR BLD AUTO: 26.2 % (ref 36–46)
HCT VFR BLD AUTO: 26.3 % (ref 36–46)
HCT VFR BLD AUTO: 26.5 % (ref 36–46)
HCT VFR BLD AUTO: 26.7 % (ref 36–46)
HCT VFR BLD AUTO: 26.8 % (ref 34.8–46.1)
HCT VFR BLD AUTO: 26.9 % (ref 36–46)
HCT VFR BLD AUTO: 27.3 % (ref 34.8–46.1)
HCT VFR BLD AUTO: 27.4 % (ref 36–46)
HCT VFR BLD AUTO: 27.8 % (ref 34.8–46.1)
HCT VFR BLD AUTO: 27.9 % (ref 34.8–46.1)
HCT VFR BLD AUTO: 28.3 % (ref 34.8–46.1)
HCT VFR BLD AUTO: 28.3 % (ref 34.8–46.1)
HCT VFR BLD AUTO: 28.5 % (ref 36–46)
HCT VFR BLD AUTO: 28.7 % (ref 36–46)
HCT VFR BLD AUTO: 28.9 % (ref 34.8–46.1)
HCT VFR BLD AUTO: 29.1 % (ref 34.8–46.1)
HCT VFR BLD AUTO: 30.7 % (ref 34.8–46.1)
HCT VFR BLD AUTO: 31 % (ref 34.8–46.1)
HCT VFR BLD AUTO: 31.3 % (ref 34.8–46.1)
HCT VFR BLD AUTO: 32 % (ref 34.8–46.1)
HCT VFR BLD AUTO: 32.8 % (ref 34.8–46.1)
HCT VFR BLD AUTO: 33 % (ref 34.8–46.1)
HCT VFR BLD AUTO: 33.4 % (ref 34.8–46.1)
HCV AB SER QL: NORMAL
HDLC SERPL-MCNC: 40 MG/DL
HEMOCCULT STL QL: NEGATIVE
HEMOCCULT STL QL: NEGATIVE
HGB BLD-MCNC: 10.1 G/DL (ref 11.5–15.4)
HGB BLD-MCNC: 7.5 G/DL (ref 11.5–15.4)
HGB BLD-MCNC: 7.6 G/DL (ref 11.5–15.4)
HGB BLD-MCNC: 8.2 G/DL (ref 12–16)
HGB BLD-MCNC: 8.3 G/DL (ref 11.5–15.4)
HGB BLD-MCNC: 8.3 G/DL (ref 12–16)
HGB BLD-MCNC: 8.4 G/DL (ref 11.5–15.4)
HGB BLD-MCNC: 8.4 G/DL (ref 12–16)
HGB BLD-MCNC: 8.5 G/DL (ref 11.5–15.4)
HGB BLD-MCNC: 8.5 G/DL (ref 11.5–15.4)
HGB BLD-MCNC: 8.5 G/DL (ref 12–16)
HGB BLD-MCNC: 8.7 G/DL (ref 11.5–15.4)
HGB BLD-MCNC: 8.8 G/DL (ref 12–16)
HGB BLD-MCNC: 8.9 G/DL (ref 11.5–15.4)
HGB BLD-MCNC: 8.9 G/DL (ref 11.5–15.4)
HGB BLD-MCNC: 9 G/DL (ref 11.5–15.4)
HGB BLD-MCNC: 9 G/DL (ref 12–16)
HGB BLD-MCNC: 9.3 G/DL (ref 11.5–15.4)
HGB BLD-MCNC: 9.5 G/DL (ref 11.5–15.4)
HGB BLD-MCNC: 9.5 G/DL (ref 11.5–15.4)
HGB BLD-MCNC: 9.5 G/DL (ref 12–16)
HGB BLD-MCNC: 9.7 G/DL (ref 11.5–15.4)
HGB BLD-MCNC: 9.7 G/DL (ref 11.5–15.4)
HGB BLD-MCNC: 9.9 G/DL (ref 11.5–15.4)
HGB UR QL STRIP.AUTO: ABNORMAL
HIV 1+2 AB+HIV1 P24 AG SERPL QL IA: NORMAL
HYPERCHROMIA BLD QL SMEAR: PRESENT
HYPERCHROMIA BLD QL SMEAR: PRESENT
IGA SERPL-MCNC: 220 MG/DL (ref 70–400)
IGG SERPL-MCNC: 1360 MG/DL (ref 700–1600)
IGG/ALB SER: 1.07 {RATIO} (ref 1.1–1.8)
IGM SERPL-MCNC: 129 MG/DL (ref 40–230)
IMM GRANULOCYTES # BLD AUTO: 0 THOUSAND/UL (ref 0–0.2)
IMM GRANULOCYTES # BLD AUTO: 0.01 THOUSAND/UL (ref 0–0.2)
IMM GRANULOCYTES # BLD AUTO: 0.02 THOUSAND/UL (ref 0–0.2)
IMM GRANULOCYTES # BLD AUTO: 0.03 THOUSAND/UL (ref 0–0.2)
IMM GRANULOCYTES # BLD AUTO: 0.03 THOUSAND/UL (ref 0–0.2)
IMM GRANULOCYTES # BLD AUTO: 0.04 THOUSAND/UL (ref 0–0.2)
IMM GRANULOCYTES NFR BLD AUTO: 0 % (ref 0–2)
IMM GRANULOCYTES NFR BLD AUTO: 1 % (ref 0–2)
IMM GRANULOCYTES NFR BLD AUTO: 1 % (ref 0–2)
INR PPP: 1.04 (ref 0.84–1.19)
INR PPP: 1.09 (ref 0.84–1.19)
INR PPP: 1.09 (ref 0.91–1.09)
IPAP: 16
IPAP: 24
IRON SATN MFR SERPL: 12 %
IRON SATN MFR SERPL: 7 %
IRON SERPL-MCNC: 24 UG/DL (ref 50–170)
IRON SERPL-MCNC: 25 UG/DL (ref 50–170)
KAPPA LC FREE SER-MCNC: 119.7 MG/L (ref 3.3–19.4)
KAPPA LC FREE/LAMBDA FREE SER: 2.73 {RATIO} (ref 0.26–1.65)
KETONES UR STRIP-MCNC: NEGATIVE MG/DL
LACTATE SERPL-SCNC: 1.5 MMOL/L (ref 0.5–2)
LACTATE SERPL-SCNC: 1.7 MMOL/L (ref 0.5–2)
LACTATE SERPL-SCNC: 2.4 MMOL/L (ref 0.5–2)
LACTATE SERPL-SCNC: 2.5 MMOL/L (ref 0.5–2)
LAMBDA LC FREE SERPL-MCNC: 43.8 MG/L (ref 5.7–26.3)
LDH SERPL-CCNC: 556 U/L (ref 313–618)
LDLC SERPL CALC-MCNC: 77 MG/DL
LEUKOCYTE ESTERASE UR QL STRIP: ABNORMAL
LYMPHOCYTES # BLD AUTO: 1.1 THOUSANDS/ΜL (ref 0.5–4)
LYMPHOCYTES # BLD AUTO: 1.1 THOUSANDS/ΜL (ref 0.5–4)
LYMPHOCYTES # BLD AUTO: 1.11 THOUSANDS/ΜL (ref 0.6–4.47)
LYMPHOCYTES # BLD AUTO: 1.2 THOUSANDS/ΜL (ref 0.5–4)
LYMPHOCYTES # BLD AUTO: 1.3 THOUSANDS/ΜL (ref 0.5–4)
LYMPHOCYTES # BLD AUTO: 1.35 THOUSANDS/ΜL (ref 0.6–4.47)
LYMPHOCYTES # BLD AUTO: 1.37 THOUSANDS/ΜL (ref 0.6–4.47)
LYMPHOCYTES # BLD AUTO: 1.4 THOUSANDS/ΜL (ref 0.5–4)
LYMPHOCYTES # BLD AUTO: 1.41 THOUSANDS/ΜL (ref 0.6–4.47)
LYMPHOCYTES # BLD AUTO: 1.42 THOUSANDS/ΜL (ref 0.6–4.47)
LYMPHOCYTES # BLD AUTO: 1.47 THOUSANDS/ΜL (ref 0.6–4.47)
LYMPHOCYTES # BLD AUTO: 1.5 THOUSANDS/ΜL (ref 0.5–4)
LYMPHOCYTES # BLD AUTO: 1.51 THOUSANDS/ΜL (ref 0.6–4.47)
LYMPHOCYTES # BLD AUTO: 1.61 THOUSANDS/ΜL (ref 0.6–4.47)
LYMPHOCYTES # BLD AUTO: 1.61 THOUSANDS/ΜL (ref 0.6–4.47)
LYMPHOCYTES # BLD AUTO: 1.62 THOUSANDS/ΜL (ref 0.6–4.47)
LYMPHOCYTES # BLD AUTO: 1.66 THOUSANDS/ΜL (ref 0.6–4.47)
LYMPHOCYTES # BLD AUTO: 1.71 THOUSANDS/ΜL (ref 0.6–4.47)
LYMPHOCYTES # BLD AUTO: 1.74 THOUSANDS/ΜL (ref 0.6–4.47)
LYMPHOCYTES # BLD AUTO: 1.9 THOUSANDS/ΜL (ref 0.5–4)
LYMPHOCYTES # BLD AUTO: 2.45 THOUSANDS/ΜL (ref 0.6–4.47)
LYMPHOCYTES # BLD AUTO: 6.58 THOUSANDS/ΜL (ref 0.6–4.47)
LYMPHOCYTES NFR BLD AUTO: 14 % (ref 25–45)
LYMPHOCYTES NFR BLD AUTO: 18 % (ref 14–44)
LYMPHOCYTES NFR BLD AUTO: 21 % (ref 14–44)
LYMPHOCYTES NFR BLD AUTO: 21 % (ref 25–45)
LYMPHOCYTES NFR BLD AUTO: 23 % (ref 14–44)
LYMPHOCYTES NFR BLD AUTO: 23 % (ref 25–45)
LYMPHOCYTES NFR BLD AUTO: 24 % (ref 14–44)
LYMPHOCYTES NFR BLD AUTO: 25 % (ref 14–44)
LYMPHOCYTES NFR BLD AUTO: 25 % (ref 25–45)
LYMPHOCYTES NFR BLD AUTO: 26 % (ref 25–45)
LYMPHOCYTES NFR BLD AUTO: 26 % (ref 25–45)
LYMPHOCYTES NFR BLD AUTO: 27 % (ref 14–44)
LYMPHOCYTES NFR BLD AUTO: 28 % (ref 14–44)
LYMPHOCYTES NFR BLD AUTO: 28 % (ref 25–45)
LYMPHOCYTES NFR BLD AUTO: 33 % (ref 14–44)
LYMPHOCYTES NFR BLD AUTO: 34 % (ref 14–44)
LYMPHOCYTES NFR BLD AUTO: 37 % (ref 14–44)
LYMPHOCYTES NFR BLD AUTO: 39 % (ref 14–44)
LYMPHOCYTES NFR BLD AUTO: 41 % (ref 14–44)
LYMPHOCYTES NFR BLD AUTO: 41 % (ref 14–44)
LYMPHOCYTES NFR BLD AUTO: 44 % (ref 14–44)
LYMPHOCYTES NFR BLD AUTO: 47 % (ref 14–44)
MAGNESIUM SERPL-MCNC: 1.8 MG/DL (ref 1.6–2.3)
MAGNESIUM SERPL-MCNC: 1.8 MG/DL (ref 1.6–2.3)
MAGNESIUM SERPL-MCNC: 1.8 MG/DL (ref 1.6–2.6)
MAGNESIUM SERPL-MCNC: 1.8 MG/DL (ref 1.6–2.6)
MAGNESIUM SERPL-MCNC: 1.9 MG/DL (ref 1.6–2.3)
MAGNESIUM SERPL-MCNC: 2.1 MG/DL (ref 1.6–2.6)
MCH RBC QN AUTO: 27.6 PG (ref 26–34)
MCH RBC QN AUTO: 27.7 PG (ref 26–34)
MCH RBC QN AUTO: 28 PG (ref 26–34)
MCH RBC QN AUTO: 28.3 PG (ref 26–34)
MCH RBC QN AUTO: 28.5 PG (ref 26–34)
MCH RBC QN AUTO: 28.5 PG (ref 26–34)
MCH RBC QN AUTO: 28.6 PG (ref 26–34)
MCH RBC QN AUTO: 29.8 PG (ref 26–34)
MCH RBC QN AUTO: 30.1 PG (ref 26–34)
MCH RBC QN AUTO: 30.2 PG (ref 26.8–34.3)
MCH RBC QN AUTO: 30.5 PG (ref 26.8–34.3)
MCH RBC QN AUTO: 30.7 PG (ref 26.8–34.3)
MCH RBC QN AUTO: 30.7 PG (ref 26–34)
MCH RBC QN AUTO: 30.9 PG (ref 26.8–34.3)
MCH RBC QN AUTO: 30.9 PG (ref 26.8–34.3)
MCH RBC QN AUTO: 31 PG (ref 26.8–34.3)
MCH RBC QN AUTO: 31.3 PG (ref 26.8–34.3)
MCH RBC QN AUTO: 31.8 PG (ref 26.8–34.3)
MCH RBC QN AUTO: 32 PG (ref 26.8–34.3)
MCH RBC QN AUTO: 32.1 PG (ref 26.8–34.3)
MCH RBC QN AUTO: 32.2 PG (ref 26.8–34.3)
MCH RBC QN AUTO: 32.3 PG (ref 26.8–34.3)
MCH RBC QN AUTO: 32.7 PG (ref 26.8–34.3)
MCHC RBC AUTO-ENTMCNC: 29.4 G/DL (ref 31.4–37.4)
MCHC RBC AUTO-ENTMCNC: 29.7 G/DL (ref 31.4–37.4)
MCHC RBC AUTO-ENTMCNC: 29.9 G/DL (ref 31.4–37.4)
MCHC RBC AUTO-ENTMCNC: 30 G/DL (ref 31.4–37.4)
MCHC RBC AUTO-ENTMCNC: 30.2 G/DL (ref 31.4–37.4)
MCHC RBC AUTO-ENTMCNC: 30.2 G/DL (ref 31.4–37.4)
MCHC RBC AUTO-ENTMCNC: 30.3 G/DL (ref 31.4–37.4)
MCHC RBC AUTO-ENTMCNC: 30.4 G/DL (ref 31.4–37.4)
MCHC RBC AUTO-ENTMCNC: 30.6 G/DL (ref 31.4–37.4)
MCHC RBC AUTO-ENTMCNC: 30.8 G/DL (ref 31.4–37.4)
MCHC RBC AUTO-ENTMCNC: 30.8 G/DL (ref 31.4–37.4)
MCHC RBC AUTO-ENTMCNC: 30.9 G/DL (ref 31.4–37.4)
MCHC RBC AUTO-ENTMCNC: 31 G/DL (ref 31.4–37.4)
MCHC RBC AUTO-ENTMCNC: 31.1 G/DL (ref 31.4–37.4)
MCHC RBC AUTO-ENTMCNC: 31.1 G/DL (ref 31–36)
MCHC RBC AUTO-ENTMCNC: 31.2 G/DL (ref 31.4–37.4)
MCHC RBC AUTO-ENTMCNC: 31.3 G/DL (ref 31.4–37.4)
MCHC RBC AUTO-ENTMCNC: 31.3 G/DL (ref 31–36)
MCHC RBC AUTO-ENTMCNC: 31.4 G/DL (ref 31.4–37.4)
MCHC RBC AUTO-ENTMCNC: 31.4 G/DL (ref 31–36)
MCHC RBC AUTO-ENTMCNC: 31.6 G/DL (ref 31–36)
MCHC RBC AUTO-ENTMCNC: 31.7 G/DL (ref 31–36)
MCHC RBC AUTO-ENTMCNC: 31.8 G/DL (ref 31–36)
MCHC RBC AUTO-ENTMCNC: 32.1 G/DL (ref 31–36)
MCHC RBC AUTO-ENTMCNC: 32.2 G/DL (ref 31–36)
MCHC RBC AUTO-ENTMCNC: 32.4 G/DL (ref 31–36)
MCHC RBC AUTO-ENTMCNC: 33.4 G/DL (ref 31–36)
MCV RBC AUTO: 100 FL (ref 82–98)
MCV RBC AUTO: 101 FL (ref 82–98)
MCV RBC AUTO: 105 FL (ref 82–98)
MCV RBC AUTO: 105 FL (ref 82–98)
MCV RBC AUTO: 106 FL (ref 82–98)
MCV RBC AUTO: 108 FL (ref 82–98)
MCV RBC AUTO: 108 FL (ref 82–98)
MCV RBC AUTO: 110 FL (ref 82–98)
MCV RBC AUTO: 89 FL (ref 80–100)
MCV RBC AUTO: 90 FL (ref 80–100)
MCV RBC AUTO: 90 FL (ref 80–100)
MCV RBC AUTO: 92 FL (ref 80–100)
MCV RBC AUTO: 93 FL (ref 80–100)
MCV RBC AUTO: 93 FL (ref 80–100)
MCV RBC AUTO: 99 FL (ref 82–98)
MCV RBC AUTO: 99 FL (ref 82–98)
MITOCHONDRIA M2 IGG SER-ACNC: <20 UNITS (ref 0–20)
MONOCYTES # BLD AUTO: 0.33 THOUSAND/ΜL (ref 0.17–1.22)
MONOCYTES # BLD AUTO: 0.37 THOUSAND/ΜL (ref 0.17–1.22)
MONOCYTES # BLD AUTO: 0.39 THOUSAND/ΜL (ref 0.17–1.22)
MONOCYTES # BLD AUTO: 0.39 THOUSAND/ΜL (ref 0.17–1.22)
MONOCYTES # BLD AUTO: 0.4 THOUSAND/ΜL (ref 0.2–0.9)
MONOCYTES # BLD AUTO: 0.43 THOUSAND/ΜL (ref 0.17–1.22)
MONOCYTES # BLD AUTO: 0.46 THOUSAND/ΜL (ref 0.17–1.22)
MONOCYTES # BLD AUTO: 0.46 THOUSAND/ΜL (ref 0.17–1.22)
MONOCYTES # BLD AUTO: 0.47 THOUSAND/ΜL (ref 0.17–1.22)
MONOCYTES # BLD AUTO: 0.49 THOUSAND/ΜL (ref 0.17–1.22)
MONOCYTES # BLD AUTO: 0.5 THOUSAND/ΜL (ref 0.2–0.9)
MONOCYTES # BLD AUTO: 0.51 THOUSAND/ΜL (ref 0.17–1.22)
MONOCYTES # BLD AUTO: 0.52 THOUSAND/ΜL (ref 0.17–1.22)
MONOCYTES # BLD AUTO: 0.58 THOUSAND/ΜL (ref 0.17–1.22)
MONOCYTES # BLD AUTO: 0.6 THOUSAND/ΜL (ref 0.2–0.9)
MONOCYTES # BLD AUTO: 0.62 THOUSAND/ΜL (ref 0.17–1.22)
MONOCYTES # BLD AUTO: 0.65 THOUSAND/ΜL (ref 0.17–1.22)
MONOCYTES # BLD AUTO: 0.7 THOUSAND/ΜL (ref 0.2–0.9)
MONOCYTES # BLD AUTO: 1.43 THOUSAND/ΜL (ref 0.17–1.22)
MONOCYTES NFR BLD AUTO: 10 % (ref 4–12)
MONOCYTES NFR BLD AUTO: 11 % (ref 4–12)
MONOCYTES NFR BLD AUTO: 7 % (ref 4–12)
MONOCYTES NFR BLD AUTO: 8 % (ref 1–10)
MONOCYTES NFR BLD AUTO: 8 % (ref 4–12)
MONOCYTES NFR BLD AUTO: 8 % (ref 4–12)
MONOCYTES NFR BLD AUTO: 9 % (ref 1–10)
MONOCYTES NFR BLD AUTO: 9 % (ref 1–10)
MONOCYTES NFR BLD AUTO: 9 % (ref 4–12)
NEUTROPHILS # BLD AUTO: 1.8 THOUSANDS/ΜL (ref 1.85–7.62)
NEUTROPHILS # BLD AUTO: 1.86 THOUSANDS/ΜL (ref 1.85–7.62)
NEUTROPHILS # BLD AUTO: 2 THOUSANDS/ΜL (ref 1.85–7.62)
NEUTROPHILS # BLD AUTO: 2.11 THOUSANDS/ΜL (ref 1.85–7.62)
NEUTROPHILS # BLD AUTO: 2.32 THOUSANDS/ΜL (ref 1.85–7.62)
NEUTROPHILS # BLD AUTO: 2.56 THOUSANDS/ΜL (ref 1.85–7.62)
NEUTROPHILS # BLD AUTO: 3.1 THOUSANDS/ΜL (ref 1.8–7.8)
NEUTROPHILS # BLD AUTO: 3.15 THOUSANDS/ΜL (ref 1.85–7.62)
NEUTROPHILS # BLD AUTO: 3.17 THOUSANDS/ΜL (ref 1.85–7.62)
NEUTROPHILS # BLD AUTO: 3.2 THOUSANDS/ΜL (ref 1.8–7.8)
NEUTROPHILS # BLD AUTO: 3.3 THOUSANDS/ΜL (ref 1.8–7.8)
NEUTROPHILS # BLD AUTO: 3.46 THOUSANDS/ΜL (ref 1.85–7.62)
NEUTROPHILS # BLD AUTO: 3.6 THOUSANDS/ΜL (ref 1.8–7.8)
NEUTROPHILS # BLD AUTO: 3.6 THOUSANDS/ΜL (ref 1.8–7.8)
NEUTROPHILS # BLD AUTO: 3.87 THOUSANDS/ΜL (ref 1.85–7.62)
NEUTROPHILS # BLD AUTO: 3.96 THOUSANDS/ΜL (ref 1.85–7.62)
NEUTROPHILS # BLD AUTO: 4.2 THOUSANDS/ΜL (ref 1.85–7.62)
NEUTROPHILS # BLD AUTO: 4.45 THOUSANDS/ΜL (ref 1.85–7.62)
NEUTROPHILS # BLD AUTO: 4.72 THOUSANDS/ΜL (ref 1.85–7.62)
NEUTROPHILS # BLD AUTO: 5 THOUSANDS/ΜL (ref 1.8–7.8)
NEUTROPHILS # BLD AUTO: 5.49 THOUSANDS/ΜL (ref 1.85–7.62)
NEUTROPHILS # BLD AUTO: 6 THOUSANDS/ΜL (ref 1.8–7.8)
NEUTS SEG NFR BLD AUTO: 40 % (ref 43–75)
NEUTS SEG NFR BLD AUTO: 45 % (ref 43–75)
NEUTS SEG NFR BLD AUTO: 47 % (ref 43–75)
NEUTS SEG NFR BLD AUTO: 47 % (ref 43–75)
NEUTS SEG NFR BLD AUTO: 48 % (ref 43–75)
NEUTS SEG NFR BLD AUTO: 50 % (ref 43–75)
NEUTS SEG NFR BLD AUTO: 55 % (ref 43–75)
NEUTS SEG NFR BLD AUTO: 57 % (ref 43–75)
NEUTS SEG NFR BLD AUTO: 61 % (ref 43–75)
NEUTS SEG NFR BLD AUTO: 61 % (ref 45–65)
NEUTS SEG NFR BLD AUTO: 62 % (ref 43–75)
NEUTS SEG NFR BLD AUTO: 62 % (ref 43–75)
NEUTS SEG NFR BLD AUTO: 62 % (ref 45–65)
NEUTS SEG NFR BLD AUTO: 63 % (ref 45–65)
NEUTS SEG NFR BLD AUTO: 65 % (ref 43–75)
NEUTS SEG NFR BLD AUTO: 66 % (ref 45–65)
NEUTS SEG NFR BLD AUTO: 66 % (ref 45–65)
NEUTS SEG NFR BLD AUTO: 68 % (ref 43–75)
NEUTS SEG NFR BLD AUTO: 68 % (ref 45–65)
NEUTS SEG NFR BLD AUTO: 69 % (ref 43–75)
NEUTS SEG NFR BLD AUTO: 70 % (ref 43–75)
NEUTS SEG NFR BLD AUTO: 76 % (ref 45–65)
NITRITE UR QL STRIP: NEGATIVE
NON VENT- BIPAP: ABNORMAL
NON VENT- BIPAP: ABNORMAL
NON-SQ EPI CELLS URNS QL MICRO: ABNORMAL /HPF
NONHDLC SERPL-MCNC: 90 MG/DL
NRBC BLD AUTO-RTO: 0 /100 WBCS
NRBC BLD AUTO-RTO: 1 /100 WBCS
NT-PROBNP SERPL-MCNC: 3890 PG/ML
NT-PROBNP SERPL-MCNC: 5470 PG/ML (ref 0–299)
NT-PROBNP SERPL-MCNC: 5782 PG/ML
NT-PROBNP SERPL-MCNC: 5790 PG/ML (ref 0–299)
NT-PROBNP SERPL-MCNC: 6340 PG/ML (ref 0–299)
NT-PROBNP SERPL-MCNC: 6410 PG/ML (ref 0–299)
NT-PROBNP SERPL-MCNC: 8210 PG/ML
NT-PROBNP SERPL-MCNC: ABNORMAL PG/ML
O2 CT BLDA-SCNC: 11.4 ML/DL (ref 16–23)
O2 CT BLDA-SCNC: 12.8 ML/DL (ref 16–23)
O2 CT BLDV-SCNC: 11.6 ML/DL
OXYHGB MFR BLDA: 88.7 % (ref 94–97)
OXYHGB MFR BLDA: 97.1 % (ref 94–97)
P AXIS: 37 DEGREES
P AXIS: 39 DEGREES
P AXIS: 41 DEGREES
P AXIS: 44 DEGREES
P AXIS: 45 DEGREES
P AXIS: 47 DEGREES
P AXIS: 48 DEGREES
P AXIS: 52 DEGREES
PCO2 BLDA: 67.2 MM HG (ref 36–44)
PCO2 BLDA: 74.4 MM HG (ref 36–44)
PCO2 BLDV: 44 MM HG (ref 41–51)
PEEP MAX SETTING VENT: 12 CM[H2O]
PEEP MAX SETTING VENT: 8 CM[H2O]
PH BLDA: 7.33 [PH] (ref 7.35–7.45)
PH BLDA: 7.33 [PH] (ref 7.35–7.45)
PH BLDV: 7.57 [PH] (ref 7.35–7.45)
PH UR STRIP.AUTO: 5.5 [PH] (ref 4.5–8)
PHOSPHATE SERPL-MCNC: 3.3 MG/DL (ref 2.3–4.1)
PLATELET # BLD AUTO: 101 THOUSANDS/UL (ref 150–450)
PLATELET # BLD AUTO: 103 THOUSANDS/UL (ref 150–450)
PLATELET # BLD AUTO: 108 THOUSANDS/UL (ref 150–450)
PLATELET # BLD AUTO: 110 THOUSANDS/UL (ref 150–450)
PLATELET # BLD AUTO: 114 THOUSANDS/UL (ref 150–450)
PLATELET # BLD AUTO: 121 THOUSANDS/UL (ref 149–390)
PLATELET # BLD AUTO: 126 THOUSANDS/UL (ref 149–390)
PLATELET # BLD AUTO: 126 THOUSANDS/UL (ref 150–450)
PLATELET # BLD AUTO: 128 THOUSANDS/UL (ref 149–390)
PLATELET # BLD AUTO: 135 THOUSANDS/UL (ref 149–390)
PLATELET # BLD AUTO: 137 THOUSANDS/UL (ref 149–390)
PLATELET # BLD AUTO: 141 THOUSANDS/UL (ref 149–390)
PLATELET # BLD AUTO: 142 THOUSANDS/UL (ref 149–390)
PLATELET # BLD AUTO: 143 THOUSANDS/UL (ref 149–390)
PLATELET # BLD AUTO: 150 THOUSANDS/UL (ref 149–390)
PLATELET # BLD AUTO: 150 THOUSANDS/UL (ref 149–390)
PLATELET # BLD AUTO: 152 THOUSANDS/UL (ref 149–390)
PLATELET # BLD AUTO: 156 THOUSANDS/UL (ref 149–390)
PLATELET # BLD AUTO: 167 THOUSANDS/UL (ref 149–390)
PLATELET # BLD AUTO: 176 THOUSANDS/UL (ref 149–390)
PLATELET # BLD AUTO: 84 THOUSANDS/UL (ref 150–450)
PLATELET # BLD AUTO: 85 THOUSANDS/UL (ref 150–450)
PLATELET # BLD AUTO: 88 THOUSANDS/UL (ref 150–450)
PLATELET # BLD AUTO: 89 THOUSANDS/UL (ref 149–390)
PLATELET # BLD AUTO: 92 THOUSANDS/UL (ref 150–450)
PLATELET # BLD AUTO: 98 THOUSANDS/UL (ref 149–390)
PLATELET # BLD AUTO: 99 THOUSANDS/UL (ref 149–390)
PLATELET BLD QL SMEAR: ABNORMAL
PLATELET BLD QL SMEAR: ABNORMAL
PMV BLD AUTO: 10.1 FL (ref 8.9–12.7)
PMV BLD AUTO: 10.2 FL (ref 8.9–12.7)
PMV BLD AUTO: 10.3 FL (ref 8.9–12.7)
PMV BLD AUTO: 10.4 FL (ref 8.9–12.7)
PMV BLD AUTO: 10.6 FL (ref 8.9–12.7)
PMV BLD AUTO: 10.7 FL (ref 8.9–12.7)
PMV BLD AUTO: 10.7 FL (ref 8.9–12.7)
PMV BLD AUTO: 10.8 FL (ref 8.9–12.7)
PMV BLD AUTO: 10.8 FL (ref 8.9–12.7)
PMV BLD AUTO: 10.9 FL (ref 8.9–12.7)
PMV BLD AUTO: 11 FL (ref 8.9–12.7)
PMV BLD AUTO: 11.1 FL (ref 8.9–12.7)
PMV BLD AUTO: 11.2 FL (ref 8.9–12.7)
PMV BLD AUTO: 11.3 FL (ref 8.9–12.7)
PMV BLD AUTO: 12 FL (ref 8.9–12.7)
PMV BLD AUTO: 9.2 FL (ref 8.9–12.7)
PMV BLD AUTO: 9.2 FL (ref 8.9–12.7)
PMV BLD AUTO: 9.3 FL (ref 8.9–12.7)
PMV BLD AUTO: 9.6 FL (ref 8.9–12.7)
PMV BLD AUTO: 9.7 FL (ref 8.9–12.7)
PMV BLD AUTO: 9.7 FL (ref 8.9–12.7)
PMV BLD AUTO: 9.8 FL (ref 8.9–12.7)
PMV BLD AUTO: 9.9 FL (ref 8.9–12.7)
PO2 BLDA: 111.2 MM HG (ref 75–129)
PO2 BLDA: 63.3 MM HG (ref 75–129)
PO2 BLDV: 152 MM HG
POIKILOCYTOSIS BLD QL SMEAR: PRESENT
POTASSIUM SERPL-SCNC: 3.2 MMOL/L (ref 3.5–5.3)
POTASSIUM SERPL-SCNC: 3.4 MMOL/L (ref 3.5–5.3)
POTASSIUM SERPL-SCNC: 3.4 MMOL/L (ref 3.6–5)
POTASSIUM SERPL-SCNC: 3.5 MMOL/L (ref 3.5–5.3)
POTASSIUM SERPL-SCNC: 3.7 MMOL/L (ref 3.5–5.3)
POTASSIUM SERPL-SCNC: 3.7 MMOL/L (ref 3.5–5.3)
POTASSIUM SERPL-SCNC: 3.8 MMOL/L (ref 3.5–5.3)
POTASSIUM SERPL-SCNC: 3.8 MMOL/L (ref 3.6–5)
POTASSIUM SERPL-SCNC: 3.9 MMOL/L (ref 3.5–5.3)
POTASSIUM SERPL-SCNC: 3.9 MMOL/L (ref 3.6–5)
POTASSIUM SERPL-SCNC: 4 MMOL/L (ref 3.5–5.3)
POTASSIUM SERPL-SCNC: 4 MMOL/L (ref 3.6–5)
POTASSIUM SERPL-SCNC: 4.1 MMOL/L (ref 3.5–5.3)
POTASSIUM SERPL-SCNC: 4.2 MMOL/L (ref 3.5–5.3)
POTASSIUM SERPL-SCNC: 4.2 MMOL/L (ref 3.5–5.3)
POTASSIUM SERPL-SCNC: 4.3 MMOL/L (ref 3.5–5.3)
POTASSIUM SERPL-SCNC: 4.3 MMOL/L (ref 3.6–5)
POTASSIUM SERPL-SCNC: 4.4 MMOL/L (ref 3.5–5.3)
POTASSIUM SERPL-SCNC: 4.4 MMOL/L (ref 3.6–5)
POTASSIUM SERPL-SCNC: 4.5 MMOL/L (ref 3.5–5.3)
POTASSIUM SERPL-SCNC: 5 MMOL/L (ref 3.5–5.3)
POTASSIUM SERPL-SCNC: 6.2 MMOL/L (ref 3.5–5.3)
PR INTERVAL: 128 MS
PR INTERVAL: 162 MS
PR INTERVAL: 168 MS
PR INTERVAL: 170 MS
PR INTERVAL: 172 MS
PR INTERVAL: 172 MS
PR INTERVAL: 174 MS
PR INTERVAL: 196 MS
PROCALCITONIN SERPL-MCNC: 0.08 NG/ML
PROCALCITONIN SERPL-MCNC: <0.05 NG/ML
PROT PATTERN SERPL ELPH-IMP: ABNORMAL
PROT SERPL-MCNC: 5.5 G/DL (ref 6.4–8.2)
PROT SERPL-MCNC: 5.7 G/DL (ref 6.4–8.2)
PROT SERPL-MCNC: 5.7 G/DL (ref 6.4–8.2)
PROT SERPL-MCNC: 5.8 G/DL (ref 5.9–8.4)
PROT SERPL-MCNC: 5.8 G/DL (ref 6.4–8.2)
PROT SERPL-MCNC: 6 G/DL (ref 5.9–8.4)
PROT SERPL-MCNC: 6.1 G/DL (ref 6.4–8.2)
PROT SERPL-MCNC: 6.5 G/DL (ref 5.9–8.4)
PROT SERPL-MCNC: 6.5 G/DL (ref 5.9–8.4)
PROT SERPL-MCNC: 6.7 G/DL (ref 5.9–8.4)
PROT SERPL-MCNC: 7 G/DL (ref 6.4–8.2)
PROT UR STRIP-MCNC: NEGATIVE MG/DL
PROTHROMBIN TIME: 12.1 SECONDS (ref 9.8–12)
PROTHROMBIN TIME: 13.7 SECONDS (ref 11.6–14.5)
PROTHROMBIN TIME: 14.2 SECONDS (ref 11.6–14.5)
QRS AXIS: -18 DEGREES
QRS AXIS: -20 DEGREES
QRS AXIS: -22 DEGREES
QRS AXIS: -27 DEGREES
QRS AXIS: -28 DEGREES
QRS AXIS: -37 DEGREES
QRS AXIS: -37 DEGREES
QRS AXIS: -40 DEGREES
QRS AXIS: -42 DEGREES
QRS AXIS: 8 DEGREES
QRSD INTERVAL: 66 MS
QRSD INTERVAL: 66 MS
QRSD INTERVAL: 74 MS
QRSD INTERVAL: 82 MS
QRSD INTERVAL: 84 MS
QRSD INTERVAL: 84 MS
QRSD INTERVAL: 88 MS
QRSD INTERVAL: 98 MS
QT INTERVAL: 256 MS
QT INTERVAL: 276 MS
QT INTERVAL: 308 MS
QT INTERVAL: 310 MS
QT INTERVAL: 334 MS
QT INTERVAL: 342 MS
QT INTERVAL: 364 MS
QT INTERVAL: 366 MS
QT INTERVAL: 372 MS
QT INTERVAL: 378 MS
QTC INTERVAL: 409 MS
QTC INTERVAL: 411 MS
QTC INTERVAL: 416 MS
QTC INTERVAL: 421 MS
QTC INTERVAL: 421 MS
QTC INTERVAL: 427 MS
QTC INTERVAL: 438 MS
QTC INTERVAL: 439 MS
QTC INTERVAL: 442 MS
QTC INTERVAL: 462 MS
RBC # BLD AUTO: 2.46 MILLION/UL (ref 3.81–5.12)
RBC # BLD AUTO: 2.46 MILLION/UL (ref 3.81–5.12)
RBC # BLD AUTO: 2.63 MILLION/UL (ref 3.81–5.12)
RBC # BLD AUTO: 2.71 MILLION/UL (ref 3.81–5.12)
RBC # BLD AUTO: 2.74 MILLION/UL (ref 3.81–5.12)
RBC # BLD AUTO: 2.75 MILLION/UL (ref 3.81–5.12)
RBC # BLD AUTO: 2.83 MILLION/UL (ref 3.81–5.12)
RBC # BLD AUTO: 2.83 MILLION/UL (ref 4–5.2)
RBC # BLD AUTO: 2.84 MILLION/UL (ref 3.81–5.12)
RBC # BLD AUTO: 2.88 MILLION/UL (ref 3.81–5.12)
RBC # BLD AUTO: 2.88 MILLION/UL (ref 4–5.2)
RBC # BLD AUTO: 2.89 MILLION/UL (ref 3.81–5.12)
RBC # BLD AUTO: 2.9 MILLION/UL (ref 3.81–5.12)
RBC # BLD AUTO: 2.92 MILLION/UL (ref 4–5.2)
RBC # BLD AUTO: 2.94 MILLION/UL (ref 3.81–5.12)
RBC # BLD AUTO: 2.96 MILLION/UL (ref 3.81–5.12)
RBC # BLD AUTO: 2.96 MILLION/UL (ref 4–5.2)
RBC # BLD AUTO: 2.96 MILLION/UL (ref 4–5.2)
RBC # BLD AUTO: 2.97 MILLION/UL (ref 3.81–5.12)
RBC # BLD AUTO: 2.97 MILLION/UL (ref 4–5.2)
RBC # BLD AUTO: 3 MILLION/UL (ref 3.81–5.12)
RBC # BLD AUTO: 3.01 MILLION/UL (ref 4–5.2)
RBC # BLD AUTO: 3.03 MILLION/UL (ref 4–5.2)
RBC # BLD AUTO: 3.1 MILLION/UL (ref 4–5.2)
RBC # BLD AUTO: 3.11 MILLION/UL (ref 3.81–5.12)
RBC # BLD AUTO: 3.16 MILLION/UL (ref 3.81–5.12)
RBC # BLD AUTO: 3.21 MILLION/UL (ref 4–5.2)
RBC #/AREA URNS AUTO: ABNORMAL /HPF
RBC MORPH BLD: ABNORMAL
RBC MORPH BLD: ABNORMAL
RETICS # CALC: 2.53 % (ref 0.87–2.63)
RH BLD: POSITIVE
RH BLD: POSITIVE
RHEUMATOID FACT SER QL LA: POSITIVE
RSV RNA NPH QL NAA+PROBE: NORMAL
RYE IGE QN: NEGATIVE
SARS-COV-2 RNA RESP QL NAA+PROBE: NEGATIVE
SARS-COV-2 RNA RESP QL NAA+PROBE: NEGATIVE
SODIUM SERPL-SCNC: 133 MMOL/L (ref 137–147)
SODIUM SERPL-SCNC: 135 MMOL/L (ref 137–147)
SODIUM SERPL-SCNC: 136 MMOL/L (ref 137–147)
SODIUM SERPL-SCNC: 137 MMOL/L (ref 136–145)
SODIUM SERPL-SCNC: 137 MMOL/L (ref 137–147)
SODIUM SERPL-SCNC: 138 MMOL/L (ref 137–147)
SODIUM SERPL-SCNC: 139 MMOL/L (ref 136–145)
SODIUM SERPL-SCNC: 139 MMOL/L (ref 137–147)
SODIUM SERPL-SCNC: 139 MMOL/L (ref 137–147)
SODIUM SERPL-SCNC: 140 MMOL/L (ref 136–145)
SODIUM SERPL-SCNC: 140 MMOL/L (ref 137–147)
SODIUM SERPL-SCNC: 141 MMOL/L (ref 136–145)
SODIUM SERPL-SCNC: 142 MMOL/L (ref 136–145)
SODIUM SERPL-SCNC: 143 MMOL/L (ref 136–145)
SODIUM SERPL-SCNC: 145 MMOL/L (ref 136–145)
SODIUM SERPL-SCNC: 145 MMOL/L (ref 136–145)
SODIUM SERPL-SCNC: 146 MMOL/L (ref 136–145)
SODIUM SERPL-SCNC: 147 MMOL/L (ref 136–145)
SP GR UR STRIP.AUTO: <=1.005 (ref 1–1.03)
SPECIMEN EXPIRATION DATE: NORMAL
SPECIMEN SOURCE: ABNORMAL
SPECIMEN SOURCE: ABNORMAL
T WAVE AXIS: -47 DEGREES
T WAVE AXIS: 26 DEGREES
T WAVE AXIS: 32 DEGREES
T WAVE AXIS: 33 DEGREES
T WAVE AXIS: 37 DEGREES
T WAVE AXIS: 44 DEGREES
T WAVE AXIS: 46 DEGREES
T WAVE AXIS: 49 DEGREES
T WAVE AXIS: 55 DEGREES
T WAVE AXIS: 75 DEGREES
TIBC SERPL-MCNC: 209 UG/DL (ref 250–450)
TIBC SERPL-MCNC: 349 UG/DL (ref 250–450)
TRIGL SERPL-MCNC: 65 MG/DL
TROPONIN I SERPL-MCNC: 0.05 NG/ML
TROPONIN I SERPL-MCNC: 0.12 NG/ML
TROPONIN I SERPL-MCNC: 0.14 NG/ML
TROPONIN I SERPL-MCNC: 0.16 NG/ML
TROPONIN I SERPL-MCNC: 0.16 NG/ML
TROPONIN I SERPL-MCNC: 0.26 NG/ML
TROPONIN I SERPL-MCNC: 0.31 NG/ML (ref 0–0.03)
TROPONIN I SERPL-MCNC: 0.44 NG/ML (ref 0–0.03)
TROPONIN I SERPL-MCNC: 0.6 NG/ML (ref 0–0.03)
TROPONIN I SERPL-MCNC: 0.72 NG/ML (ref 0–0.03)
TROPONIN I SERPL-MCNC: 0.83 NG/ML (ref 0–0.03)
TROPONIN I SERPL-MCNC: 0.99 NG/ML (ref 0–0.03)
TROPONIN I SERPL-MCNC: 1.03 NG/ML (ref 0–0.03)
TROPONIN I SERPL-MCNC: 1.05 NG/ML
TROPONIN I SERPL-MCNC: 1.22 NG/ML
TROPONIN I SERPL-MCNC: 12.06 NG/ML
TROPONIN I SERPL-MCNC: 2.33 NG/ML
TROPONIN I SERPL-MCNC: 6.34 NG/ML
TROPONIN I SERPL-MCNC: 9.02 NG/ML
TROPONIN I SERPL-MCNC: 9.21 NG/ML
TSH SERPL DL<=0.05 MIU/L-ACNC: 2.02 UIU/ML (ref 0.47–4.68)
TSH SERPL DL<=0.05 MIU/L-ACNC: 5.56 UIU/ML (ref 0.36–3.74)
UNIT DISPENSE STATUS: NORMAL
UNIT PRODUCT CODE: NORMAL
UNIT RH: NORMAL
UROBILINOGEN UR QL STRIP.AUTO: 1 E.U./DL
VENT BIPAP FIO2: 100 %
VENT BIPAP FIO2: 70 %
VENTRICULAR RATE: 104 BPM
VENTRICULAR RATE: 107 BPM
VENTRICULAR RATE: 111 BPM
VENTRICULAR RATE: 137 BPM
VENTRICULAR RATE: 163 BPM
VENTRICULAR RATE: 82 BPM
VENTRICULAR RATE: 85 BPM
VENTRICULAR RATE: 86 BPM
VENTRICULAR RATE: 87 BPM
VENTRICULAR RATE: 90 BPM
VIT B12 SERPL-MCNC: 326 PG/ML (ref 100–900)
VIT B12 SERPL-MCNC: 329 PG/ML (ref 100–900)
WBC # BLD AUTO: 13.84 THOUSAND/UL (ref 4.31–10.16)
WBC # BLD AUTO: 3.8 THOUSAND/UL (ref 4.5–11)
WBC # BLD AUTO: 3.92 THOUSAND/UL (ref 4.31–10.16)
WBC # BLD AUTO: 3.95 THOUSAND/UL (ref 4.31–10.16)
WBC # BLD AUTO: 4 THOUSAND/UL (ref 4.5–11)
WBC # BLD AUTO: 4.21 THOUSAND/UL (ref 4.31–10.16)
WBC # BLD AUTO: 4.26 THOUSAND/UL (ref 4.31–10.16)
WBC # BLD AUTO: 4.3 THOUSAND/UL (ref 4.31–10.16)
WBC # BLD AUTO: 4.55 THOUSAND/UL (ref 4.31–10.16)
WBC # BLD AUTO: 5.02 THOUSAND/UL (ref 4.31–10.16)
WBC # BLD AUTO: 5.1 THOUSAND/UL (ref 4.5–11)
WBC # BLD AUTO: 5.1 THOUSAND/UL (ref 4.5–11)
WBC # BLD AUTO: 5.17 THOUSAND/UL (ref 4.31–10.16)
WBC # BLD AUTO: 5.2 THOUSAND/UL (ref 4.5–11)
WBC # BLD AUTO: 5.3 THOUSAND/UL (ref 4.5–11)
WBC # BLD AUTO: 5.4 THOUSAND/UL (ref 4.5–11)
WBC # BLD AUTO: 5.4 THOUSAND/UL (ref 4.5–11)
WBC # BLD AUTO: 5.59 THOUSAND/UL (ref 4.31–10.16)
WBC # BLD AUTO: 5.99 THOUSAND/UL (ref 4.31–10.16)
WBC # BLD AUTO: 6.05 THOUSAND/UL (ref 4.31–10.16)
WBC # BLD AUTO: 6.07 THOUSAND/UL (ref 4.31–10.16)
WBC # BLD AUTO: 6.37 THOUSAND/UL (ref 4.31–10.16)
WBC # BLD AUTO: 6.48 THOUSAND/UL (ref 4.31–10.16)
WBC # BLD AUTO: 6.96 THOUSAND/UL (ref 4.31–10.16)
WBC # BLD AUTO: 7.6 THOUSAND/UL (ref 4.5–11)
WBC # BLD AUTO: 7.8 THOUSAND/UL (ref 4.5–11)
WBC # BLD AUTO: 9.85 THOUSAND/UL (ref 4.31–10.16)
WBC #/AREA URNS AUTO: ABNORMAL /HPF

## 2020-01-01 PROCEDURE — 85610 PROTHROMBIN TIME: CPT | Performed by: EMERGENCY MEDICINE

## 2020-01-01 PROCEDURE — 94762 N-INVAS EAR/PLS OXIMTRY CONT: CPT

## 2020-01-01 PROCEDURE — 87635 SARS-COV-2 COVID-19 AMP PRB: CPT | Performed by: EMERGENCY MEDICINE

## 2020-01-01 PROCEDURE — 83735 ASSAY OF MAGNESIUM: CPT | Performed by: EMERGENCY MEDICINE

## 2020-01-01 PROCEDURE — 94660 CPAP INITIATION&MGMT: CPT

## 2020-01-01 PROCEDURE — 97116 GAIT TRAINING THERAPY: CPT

## 2020-01-01 PROCEDURE — 84100 ASSAY OF PHOSPHORUS: CPT | Performed by: NURSE PRACTITIONER

## 2020-01-01 PROCEDURE — 97530 THERAPEUTIC ACTIVITIES: CPT

## 2020-01-01 PROCEDURE — 99309 SBSQ NF CARE MODERATE MDM 30: CPT | Performed by: FAMILY MEDICINE

## 2020-01-01 PROCEDURE — 99291 CRITICAL CARE FIRST HOUR: CPT

## 2020-01-01 PROCEDURE — 0HBRXZZ EXCISION OF TOE NAIL, EXTERNAL APPROACH: ICD-10-PCS | Performed by: FAMILY MEDICINE

## 2020-01-01 PROCEDURE — 80053 COMPREHEN METABOLIC PANEL: CPT | Performed by: INTERNAL MEDICINE

## 2020-01-01 PROCEDURE — 94760 N-INVAS EAR/PLS OXIMETRY 1: CPT

## 2020-01-01 PROCEDURE — 93970 EXTREMITY STUDY: CPT

## 2020-01-01 PROCEDURE — 94640 AIRWAY INHALATION TREATMENT: CPT

## 2020-01-01 PROCEDURE — 30233N1 TRANSFUSION OF NONAUTOLOGOUS RED BLOOD CELLS INTO PERIPHERAL VEIN, PERCUTANEOUS APPROACH: ICD-10-PCS | Performed by: FAMILY MEDICINE

## 2020-01-01 PROCEDURE — 99233 SBSQ HOSP IP/OBS HIGH 50: CPT | Performed by: FAMILY MEDICINE

## 2020-01-01 PROCEDURE — 82805 BLOOD GASES W/O2 SATURATION: CPT | Performed by: HOSPITALIST

## 2020-01-01 PROCEDURE — 99232 SBSQ HOSP IP/OBS MODERATE 35: CPT | Performed by: INTERNAL MEDICINE

## 2020-01-01 PROCEDURE — 84165 PROTEIN E-PHORESIS SERUM: CPT | Performed by: NURSE PRACTITIONER

## 2020-01-01 PROCEDURE — 97535 SELF CARE MNGMENT TRAINING: CPT

## 2020-01-01 PROCEDURE — 93005 ELECTROCARDIOGRAM TRACING: CPT

## 2020-01-01 PROCEDURE — 82728 ASSAY OF FERRITIN: CPT | Performed by: FAMILY MEDICINE

## 2020-01-01 PROCEDURE — 85025 COMPLETE CBC W/AUTO DIFF WBC: CPT | Performed by: INTERNAL MEDICINE

## 2020-01-01 PROCEDURE — 83615 LACTATE (LD) (LDH) ENZYME: CPT | Performed by: NURSE PRACTITIONER

## 2020-01-01 PROCEDURE — 99285 EMERGENCY DEPT VISIT HI MDM: CPT | Performed by: EMERGENCY MEDICINE

## 2020-01-01 PROCEDURE — 83880 ASSAY OF NATRIURETIC PEPTIDE: CPT | Performed by: FAMILY MEDICINE

## 2020-01-01 PROCEDURE — 97110 THERAPEUTIC EXERCISES: CPT

## 2020-01-01 PROCEDURE — 84484 ASSAY OF TROPONIN QUANT: CPT | Performed by: FAMILY MEDICINE

## 2020-01-01 PROCEDURE — 80048 BASIC METABOLIC PNL TOTAL CA: CPT | Performed by: FAMILY MEDICINE

## 2020-01-01 PROCEDURE — 71046 X-RAY EXAM CHEST 2 VIEWS: CPT

## 2020-01-01 PROCEDURE — 82805 BLOOD GASES W/O2 SATURATION: CPT | Performed by: PHYSICIAN ASSISTANT

## 2020-01-01 PROCEDURE — 93010 ELECTROCARDIOGRAM REPORT: CPT | Performed by: INTERNAL MEDICINE

## 2020-01-01 PROCEDURE — NC001 PR NO CHARGE: Performed by: FAMILY MEDICINE

## 2020-01-01 PROCEDURE — 99291 CRITICAL CARE FIRST HOUR: CPT | Performed by: EMERGENCY MEDICINE

## 2020-01-01 PROCEDURE — 87040 BLOOD CULTURE FOR BACTERIA: CPT | Performed by: EMERGENCY MEDICINE

## 2020-01-01 PROCEDURE — 94664 DEMO&/EVAL PT USE INHALER: CPT

## 2020-01-01 PROCEDURE — 80061 LIPID PANEL: CPT | Performed by: FAMILY MEDICINE

## 2020-01-01 PROCEDURE — 99233 SBSQ HOSP IP/OBS HIGH 50: CPT | Performed by: INTERNAL MEDICINE

## 2020-01-01 PROCEDURE — 80048 BASIC METABOLIC PNL TOTAL CA: CPT | Performed by: INTERNAL MEDICINE

## 2020-01-01 PROCEDURE — 82746 ASSAY OF FOLIC ACID SERUM: CPT | Performed by: FAMILY MEDICINE

## 2020-01-01 PROCEDURE — 85652 RBC SED RATE AUTOMATED: CPT | Performed by: NURSE PRACTITIONER

## 2020-01-01 PROCEDURE — 84484 ASSAY OF TROPONIN QUANT: CPT | Performed by: EMERGENCY MEDICINE

## 2020-01-01 PROCEDURE — 99223 1ST HOSP IP/OBS HIGH 75: CPT | Performed by: INTERNAL MEDICINE

## 2020-01-01 PROCEDURE — 82805 BLOOD GASES W/O2 SATURATION: CPT | Performed by: FAMILY MEDICINE

## 2020-01-01 PROCEDURE — 99238 HOSP IP/OBS DSCHRG MGMT 30/<: CPT | Performed by: FAMILY MEDICINE

## 2020-01-01 PROCEDURE — 99292 CRITICAL CARE ADDL 30 MIN: CPT | Performed by: EMERGENCY MEDICINE

## 2020-01-01 PROCEDURE — 94002 VENT MGMT INPAT INIT DAY: CPT

## 2020-01-01 PROCEDURE — 85025 COMPLETE CBC W/AUTO DIFF WBC: CPT | Performed by: FAMILY MEDICINE

## 2020-01-01 PROCEDURE — 86140 C-REACTIVE PROTEIN: CPT | Performed by: NURSE PRACTITIONER

## 2020-01-01 PROCEDURE — 86920 COMPATIBILITY TEST SPIN: CPT

## 2020-01-01 PROCEDURE — 86850 RBC ANTIBODY SCREEN: CPT | Performed by: INTERNAL MEDICINE

## 2020-01-01 PROCEDURE — 97167 OT EVAL HIGH COMPLEX 60 MIN: CPT

## 2020-01-01 PROCEDURE — 83735 ASSAY OF MAGNESIUM: CPT | Performed by: FAMILY MEDICINE

## 2020-01-01 PROCEDURE — 85025 COMPLETE CBC W/AUTO DIFF WBC: CPT | Performed by: EMERGENCY MEDICINE

## 2020-01-01 PROCEDURE — 86803 HEPATITIS C AB TEST: CPT | Performed by: NURSE PRACTITIONER

## 2020-01-01 PROCEDURE — 99305 1ST NF CARE MODERATE MDM 35: CPT | Performed by: FAMILY MEDICINE

## 2020-01-01 PROCEDURE — 85730 THROMBOPLASTIN TIME PARTIAL: CPT | Performed by: EMERGENCY MEDICINE

## 2020-01-01 PROCEDURE — 87631 RESP VIRUS 3-5 TARGETS: CPT | Performed by: EMERGENCY MEDICINE

## 2020-01-01 PROCEDURE — 85045 AUTOMATED RETICULOCYTE COUNT: CPT | Performed by: FAMILY MEDICINE

## 2020-01-01 PROCEDURE — 77075 RADEX OSSEOUS SURVEY COMPL: CPT

## 2020-01-01 PROCEDURE — 82272 OCCULT BLD FECES 1-3 TESTS: CPT | Performed by: FAMILY MEDICINE

## 2020-01-01 PROCEDURE — 87186 SC STD MICRODIL/AGAR DIL: CPT

## 2020-01-01 PROCEDURE — 71045 X-RAY EXAM CHEST 1 VIEW: CPT

## 2020-01-01 PROCEDURE — 85610 PROTHROMBIN TIME: CPT | Performed by: FAMILY MEDICINE

## 2020-01-01 PROCEDURE — 99291 CRITICAL CARE FIRST HOUR: CPT | Performed by: INTERNAL MEDICINE

## 2020-01-01 PROCEDURE — 84165 PROTEIN E-PHORESIS SERUM: CPT | Performed by: PATHOLOGY

## 2020-01-01 PROCEDURE — 80048 BASIC METABOLIC PNL TOTAL CA: CPT | Performed by: PHYSICIAN ASSISTANT

## 2020-01-01 PROCEDURE — 99222 1ST HOSP IP/OBS MODERATE 55: CPT | Performed by: INTERNAL MEDICINE

## 2020-01-01 PROCEDURE — 83540 ASSAY OF IRON: CPT | Performed by: FAMILY MEDICINE

## 2020-01-01 PROCEDURE — 83605 ASSAY OF LACTIC ACID: CPT | Performed by: EMERGENCY MEDICINE

## 2020-01-01 PROCEDURE — 99232 SBSQ HOSP IP/OBS MODERATE 35: CPT | Performed by: FAMILY MEDICINE

## 2020-01-01 PROCEDURE — 80053 COMPREHEN METABOLIC PANEL: CPT | Performed by: FAMILY MEDICINE

## 2020-01-01 PROCEDURE — 80048 BASIC METABOLIC PNL TOTAL CA: CPT | Performed by: HOSPITALIST

## 2020-01-01 PROCEDURE — 83880 ASSAY OF NATRIURETIC PEPTIDE: CPT | Performed by: PHYSICIAN ASSISTANT

## 2020-01-01 PROCEDURE — 87077 CULTURE AEROBIC IDENTIFY: CPT

## 2020-01-01 PROCEDURE — 84145 PROCALCITONIN (PCT): CPT | Performed by: FAMILY MEDICINE

## 2020-01-01 PROCEDURE — 86256 FLUORESCENT ANTIBODY TITER: CPT | Performed by: NURSE PRACTITIONER

## 2020-01-01 PROCEDURE — 93306 TTE W/DOPPLER COMPLETE: CPT

## 2020-01-01 PROCEDURE — 82784 ASSAY IGA/IGD/IGG/IGM EACH: CPT | Performed by: NURSE PRACTITIONER

## 2020-01-01 PROCEDURE — 82232 ASSAY OF BETA-2 PROTEIN: CPT | Performed by: NURSE PRACTITIONER

## 2020-01-01 PROCEDURE — 71250 CT THORAX DX C-: CPT

## 2020-01-01 PROCEDURE — 99222 1ST HOSP IP/OBS MODERATE 55: CPT | Performed by: NURSE PRACTITIONER

## 2020-01-01 PROCEDURE — 84484 ASSAY OF TROPONIN QUANT: CPT | Performed by: PHYSICIAN ASSISTANT

## 2020-01-01 PROCEDURE — 36600 WITHDRAWAL OF ARTERIAL BLOOD: CPT

## 2020-01-01 PROCEDURE — 93971 EXTREMITY STUDY: CPT | Performed by: SURGERY

## 2020-01-01 PROCEDURE — 84484 ASSAY OF TROPONIN QUANT: CPT | Performed by: HOSPITALIST

## 2020-01-01 PROCEDURE — 80048 BASIC METABOLIC PNL TOTAL CA: CPT | Performed by: NURSE PRACTITIONER

## 2020-01-01 PROCEDURE — 99223 1ST HOSP IP/OBS HIGH 75: CPT | Performed by: PHYSICIAN ASSISTANT

## 2020-01-01 PROCEDURE — 83550 IRON BINDING TEST: CPT | Performed by: FAMILY MEDICINE

## 2020-01-01 PROCEDURE — 84145 PROCALCITONIN (PCT): CPT | Performed by: EMERGENCY MEDICINE

## 2020-01-01 PROCEDURE — 84484 ASSAY OF TROPONIN QUANT: CPT | Performed by: INTERNAL MEDICINE

## 2020-01-01 PROCEDURE — 36415 COLL VENOUS BLD VENIPUNCTURE: CPT | Performed by: EMERGENCY MEDICINE

## 2020-01-01 PROCEDURE — 99232 SBSQ HOSP IP/OBS MODERATE 35: CPT | Performed by: PHYSICIAN ASSISTANT

## 2020-01-01 PROCEDURE — 99232 SBSQ HOSP IP/OBS MODERATE 35: CPT

## 2020-01-01 PROCEDURE — 83880 ASSAY OF NATRIURETIC PEPTIDE: CPT | Performed by: EMERGENCY MEDICINE

## 2020-01-01 PROCEDURE — 83010 ASSAY OF HAPTOGLOBIN QUANT: CPT | Performed by: NURSE PRACTITIONER

## 2020-01-01 PROCEDURE — 86430 RHEUMATOID FACTOR TEST QUAL: CPT | Performed by: NURSE PRACTITIONER

## 2020-01-01 PROCEDURE — 93970 EXTREMITY STUDY: CPT | Performed by: SURGERY

## 2020-01-01 PROCEDURE — 73502 X-RAY EXAM HIP UNI 2-3 VIEWS: CPT

## 2020-01-01 PROCEDURE — 82607 VITAMIN B-12: CPT | Performed by: FAMILY MEDICINE

## 2020-01-01 PROCEDURE — 87340 HEPATITIS B SURFACE AG IA: CPT | Performed by: NURSE PRACTITIONER

## 2020-01-01 PROCEDURE — 84484 ASSAY OF TROPONIN QUANT: CPT | Performed by: NURSE PRACTITIONER

## 2020-01-01 PROCEDURE — 84443 ASSAY THYROID STIM HORMONE: CPT | Performed by: FAMILY MEDICINE

## 2020-01-01 PROCEDURE — 93971 EXTREMITY STUDY: CPT

## 2020-01-01 PROCEDURE — 97163 PT EVAL HIGH COMPLEX 45 MIN: CPT

## 2020-01-01 PROCEDURE — 83880 ASSAY OF NATRIURETIC PEPTIDE: CPT | Performed by: INTERNAL MEDICINE

## 2020-01-01 PROCEDURE — 82105 ALPHA-FETOPROTEIN SERUM: CPT | Performed by: INTERNAL MEDICINE

## 2020-01-01 PROCEDURE — 85027 COMPLETE CBC AUTOMATED: CPT | Performed by: FAMILY MEDICINE

## 2020-01-01 PROCEDURE — 83735 ASSAY OF MAGNESIUM: CPT | Performed by: INTERNAL MEDICINE

## 2020-01-01 PROCEDURE — 96367 TX/PROPH/DG ADDL SEQ IV INF: CPT

## 2020-01-01 PROCEDURE — 81001 URINALYSIS AUTO W/SCOPE: CPT

## 2020-01-01 PROCEDURE — 86705 HEP B CORE ANTIBODY IGM: CPT | Performed by: NURSE PRACTITIONER

## 2020-01-01 PROCEDURE — 96374 THER/PROPH/DIAG INJ IV PUSH: CPT

## 2020-01-01 PROCEDURE — 83883 ASSAY NEPHELOMETRY NOT SPEC: CPT | Performed by: NURSE PRACTITIONER

## 2020-01-01 PROCEDURE — 86901 BLOOD TYPING SEROLOGIC RH(D): CPT | Performed by: INTERNAL MEDICINE

## 2020-01-01 PROCEDURE — 80162 ASSAY OF DIGOXIN TOTAL: CPT | Performed by: INTERNAL MEDICINE

## 2020-01-01 PROCEDURE — 99285 EMERGENCY DEPT VISIT HI MDM: CPT

## 2020-01-01 PROCEDURE — 87086 URINE CULTURE/COLONY COUNT: CPT

## 2020-01-01 PROCEDURE — 84443 ASSAY THYROID STIM HORMONE: CPT | Performed by: EMERGENCY MEDICINE

## 2020-01-01 PROCEDURE — 99232 SBSQ HOSP IP/OBS MODERATE 35: CPT | Performed by: HOSPITALIST

## 2020-01-01 PROCEDURE — 85027 COMPLETE CBC AUTOMATED: CPT | Performed by: NURSE PRACTITIONER

## 2020-01-01 PROCEDURE — 94003 VENT MGMT INPAT SUBQ DAY: CPT

## 2020-01-01 PROCEDURE — 99308 SBSQ NF CARE LOW MDM 20: CPT | Performed by: FAMILY MEDICINE

## 2020-01-01 PROCEDURE — 86880 COOMBS TEST DIRECT: CPT | Performed by: NURSE PRACTITIONER

## 2020-01-01 PROCEDURE — 96365 THER/PROPH/DIAG IV INF INIT: CPT

## 2020-01-01 PROCEDURE — 99284 EMERGENCY DEPT VISIT MOD MDM: CPT

## 2020-01-01 PROCEDURE — 80053 COMPREHEN METABOLIC PANEL: CPT | Performed by: EMERGENCY MEDICINE

## 2020-01-01 PROCEDURE — 96375 TX/PRO/DX INJ NEW DRUG ADDON: CPT

## 2020-01-01 PROCEDURE — 76705 ECHO EXAM OF ABDOMEN: CPT

## 2020-01-01 PROCEDURE — 73552 X-RAY EXAM OF FEMUR 2/>: CPT

## 2020-01-01 PROCEDURE — 83735 ASSAY OF MAGNESIUM: CPT | Performed by: NURSE PRACTITIONER

## 2020-01-01 PROCEDURE — 86900 BLOOD TYPING SEROLOGIC ABO: CPT | Performed by: INTERNAL MEDICINE

## 2020-01-01 PROCEDURE — 99315 NF DSCHRG MGMT 30 MIN/LESS: CPT | Performed by: FAMILY MEDICINE

## 2020-01-01 PROCEDURE — 86704 HEP B CORE ANTIBODY TOTAL: CPT | Performed by: NURSE PRACTITIONER

## 2020-01-01 PROCEDURE — 71275 CT ANGIOGRAPHY CHEST: CPT

## 2020-01-01 PROCEDURE — 99238 HOSP IP/OBS DSCHRG MGMT 30/<: CPT | Performed by: HOSPITALIST

## 2020-01-01 PROCEDURE — 99223 1ST HOSP IP/OBS HIGH 75: CPT | Performed by: FAMILY MEDICINE

## 2020-01-01 PROCEDURE — 97129 THER IVNTJ 1ST 15 MIN: CPT

## 2020-01-01 PROCEDURE — 82668 ASSAY OF ERYTHROPOIETIN: CPT | Performed by: NURSE PRACTITIONER

## 2020-01-01 PROCEDURE — 85027 COMPLETE CBC AUTOMATED: CPT | Performed by: HOSPITALIST

## 2020-01-01 PROCEDURE — 86038 ANTINUCLEAR ANTIBODIES: CPT | Performed by: NURSE PRACTITIONER

## 2020-01-01 PROCEDURE — 87389 HIV-1 AG W/HIV-1&-2 AB AG IA: CPT | Performed by: NURSE PRACTITIONER

## 2020-01-01 PROCEDURE — 85025 COMPLETE CBC W/AUTO DIFF WBC: CPT | Performed by: PHYSICIAN ASSISTANT

## 2020-01-01 PROCEDURE — 82040 ASSAY OF SERUM ALBUMIN: CPT | Performed by: FAMILY MEDICINE

## 2020-01-01 PROCEDURE — P9040 RBC LEUKOREDUCED IRRADIATED: HCPCS

## 2020-01-01 PROCEDURE — 99233 SBSQ HOSP IP/OBS HIGH 50: CPT | Performed by: PHYSICIAN ASSISTANT

## 2020-01-01 PROCEDURE — 93306 TTE W/DOPPLER COMPLETE: CPT | Performed by: INTERNAL MEDICINE

## 2020-01-01 PROCEDURE — 86200 CCP ANTIBODY: CPT | Performed by: FAMILY MEDICINE

## 2020-01-01 PROCEDURE — 86431 RHEUMATOID FACTOR QUANT: CPT | Performed by: NURSE PRACTITIONER

## 2020-01-01 PROCEDURE — 97130 THER IVNTJ EA ADDL 15 MIN: CPT

## 2020-01-01 RX ORDER — IPRATROPIUM BROMIDE AND ALBUTEROL SULFATE 2.5; .5 MG/3ML; MG/3ML
3 SOLUTION RESPIRATORY (INHALATION)
Status: DISCONTINUED | OUTPATIENT
Start: 2020-01-01 | End: 2020-01-01

## 2020-01-01 RX ORDER — ACETAZOLAMIDE 250 MG/1
500 TABLET ORAL ONCE
Status: COMPLETED | OUTPATIENT
Start: 2020-01-01 | End: 2020-01-01

## 2020-01-01 RX ORDER — METOPROLOL TARTRATE 5 MG/5ML
2.5 INJECTION INTRAVENOUS ONCE
Status: DISCONTINUED | OUTPATIENT
Start: 2020-01-01 | End: 2020-01-01

## 2020-01-01 RX ORDER — DOCUSATE SODIUM 100 MG/1
100 CAPSULE, LIQUID FILLED ORAL DAILY
Status: DISCONTINUED | OUTPATIENT
Start: 2020-01-01 | End: 2020-01-01 | Stop reason: HOSPADM

## 2020-01-01 RX ORDER — FUROSEMIDE 10 MG/ML
40 INJECTION INTRAMUSCULAR; INTRAVENOUS 2 TIMES DAILY
Status: DISCONTINUED | OUTPATIENT
Start: 2020-01-01 | End: 2020-01-01

## 2020-01-01 RX ORDER — FUROSEMIDE 40 MG/1
40 TABLET ORAL DAILY
Status: DISCONTINUED | OUTPATIENT
Start: 2020-01-01 | End: 2020-01-01 | Stop reason: HOSPADM

## 2020-01-01 RX ORDER — ESMOLOL HYDROCHLORIDE 10 MG/ML
25-200 INJECTION, SOLUTION INTRAVENOUS
Status: DISCONTINUED | OUTPATIENT
Start: 2020-01-01 | End: 2020-01-01

## 2020-01-01 RX ORDER — TORSEMIDE 20 MG/1
20 TABLET ORAL
Status: DISCONTINUED | OUTPATIENT
Start: 2020-01-01 | End: 2020-01-01

## 2020-01-01 RX ORDER — BUMETANIDE 0.25 MG/ML
2 INJECTION, SOLUTION INTRAMUSCULAR; INTRAVENOUS ONCE
Status: COMPLETED | OUTPATIENT
Start: 2020-01-01 | End: 2020-01-01

## 2020-01-01 RX ORDER — AMIODARONE HYDROCHLORIDE 200 MG/1
400 TABLET ORAL 3 TIMES DAILY
Status: DISCONTINUED | OUTPATIENT
Start: 2020-01-01 | End: 2020-01-01

## 2020-01-01 RX ORDER — ASPIRIN 81 MG/1
81 TABLET, CHEWABLE ORAL DAILY
Qty: 30 TABLET | Refills: 0 | Status: SHIPPED | OUTPATIENT
Start: 2020-01-01 | End: 2020-01-01 | Stop reason: HOSPADM

## 2020-01-01 RX ORDER — METOPROLOL TARTRATE 5 MG/5ML
5 INJECTION INTRAVENOUS ONCE
Status: DISCONTINUED | OUTPATIENT
Start: 2020-01-01 | End: 2020-01-01

## 2020-01-01 RX ORDER — PERMETHRIN 50 MG/G
CREAM TOPICAL ONCE
Status: COMPLETED | OUTPATIENT
Start: 2020-01-01 | End: 2020-01-01

## 2020-01-01 RX ORDER — FOLIC ACID 1 MG/1
1 TABLET ORAL DAILY
Qty: 30 TABLET | Refills: 0 | Status: SHIPPED | OUTPATIENT
Start: 2020-01-01 | End: 2020-01-01 | Stop reason: HOSPADM

## 2020-01-01 RX ORDER — DOXYCYCLINE HYCLATE 50 MG/1
324 CAPSULE, GELATIN COATED ORAL
Status: CANCELLED | OUTPATIENT
Start: 2020-01-01

## 2020-01-01 RX ORDER — DIGOXIN 125 MCG
125 TABLET ORAL ONCE
Status: COMPLETED | OUTPATIENT
Start: 2020-01-01 | End: 2020-01-01

## 2020-01-01 RX ORDER — BENZONATATE 100 MG/1
100 CAPSULE ORAL 3 TIMES DAILY PRN
Status: DISCONTINUED | OUTPATIENT
Start: 2020-01-01 | End: 2020-01-01

## 2020-01-01 RX ORDER — NITROGLYCERIN 0.4 MG/1
0.4 TABLET SUBLINGUAL
Qty: 20 TABLET | Refills: 0 | Status: SHIPPED | OUTPATIENT
Start: 2020-01-01 | End: 2020-01-01 | Stop reason: HOSPADM

## 2020-01-01 RX ORDER — FUROSEMIDE 10 MG/ML
80 INJECTION INTRAMUSCULAR; INTRAVENOUS ONCE
Status: COMPLETED | OUTPATIENT
Start: 2020-01-01 | End: 2020-01-01

## 2020-01-01 RX ORDER — ACETAMINOPHEN 325 MG/1
975 TABLET ORAL ONCE
Status: COMPLETED | OUTPATIENT
Start: 2020-01-01 | End: 2020-01-01

## 2020-01-01 RX ORDER — BENZONATATE 100 MG/1
100 CAPSULE ORAL 3 TIMES DAILY PRN
Status: CANCELLED | OUTPATIENT
Start: 2020-01-01

## 2020-01-01 RX ORDER — GUAIFENESIN 600 MG
600 TABLET, EXTENDED RELEASE 12 HR ORAL EVERY 12 HOURS SCHEDULED
Status: CANCELLED | OUTPATIENT
Start: 2020-01-01

## 2020-01-01 RX ORDER — POTASSIUM CHLORIDE 20 MEQ/1
40 TABLET, EXTENDED RELEASE ORAL ONCE
Status: COMPLETED | OUTPATIENT
Start: 2020-01-01 | End: 2020-01-01

## 2020-01-01 RX ORDER — LEVALBUTEROL 1.25 MG/.5ML
1.25 SOLUTION, CONCENTRATE RESPIRATORY (INHALATION) EVERY 6 HOURS PRN
Status: DISCONTINUED | OUTPATIENT
Start: 2020-01-01 | End: 2020-01-01 | Stop reason: HOSPADM

## 2020-01-01 RX ORDER — NYSTATIN 100000 [USP'U]/G
POWDER TOPICAL 2 TIMES DAILY
Status: DISCONTINUED | OUTPATIENT
Start: 2020-01-01 | End: 2020-01-01 | Stop reason: HOSPADM

## 2020-01-01 RX ORDER — FOLIC ACID 1 MG/1
1 TABLET ORAL DAILY
Status: DISCONTINUED | OUTPATIENT
Start: 2020-01-01 | End: 2020-01-01 | Stop reason: HOSPADM

## 2020-01-01 RX ORDER — DILTIAZEM HYDROCHLORIDE 120 MG/1
120 CAPSULE, COATED, EXTENDED RELEASE ORAL
Status: DISCONTINUED | OUTPATIENT
Start: 2020-01-01 | End: 2020-01-01

## 2020-01-01 RX ORDER — FUROSEMIDE 40 MG/1
40 TABLET ORAL DAILY
Qty: 30 TABLET | Refills: 0 | Status: SHIPPED | OUTPATIENT
Start: 2020-01-01 | End: 2020-01-01 | Stop reason: HOSPADM

## 2020-01-01 RX ORDER — MAGNESIUM SULFATE 1 G/100ML
1 INJECTION INTRAVENOUS ONCE
Status: COMPLETED | OUTPATIENT
Start: 2020-01-01 | End: 2020-01-01

## 2020-01-01 RX ORDER — BENZONATATE 100 MG/1
100 CAPSULE ORAL 3 TIMES DAILY PRN
Status: DISCONTINUED | OUTPATIENT
Start: 2020-01-01 | End: 2020-01-01 | Stop reason: HOSPADM

## 2020-01-01 RX ORDER — ASPIRIN 81 MG/1
81 TABLET, CHEWABLE ORAL DAILY
Status: DISCONTINUED | OUTPATIENT
Start: 2020-01-01 | End: 2020-01-01 | Stop reason: HOSPADM

## 2020-01-01 RX ORDER — FUROSEMIDE 10 MG/ML
10 SYRINGE (ML) INJECTION CONTINUOUS
Status: DISCONTINUED | OUTPATIENT
Start: 2020-01-01 | End: 2020-01-01

## 2020-01-01 RX ORDER — GUAIFENESIN 100 MG/5ML
200 SOLUTION ORAL EVERY 4 HOURS PRN
Status: DISCONTINUED | OUTPATIENT
Start: 2020-01-01 | End: 2020-01-01 | Stop reason: HOSPADM

## 2020-01-01 RX ORDER — ACETAZOLAMIDE 250 MG/1
250 TABLET ORAL ONCE
Status: COMPLETED | OUTPATIENT
Start: 2020-01-01 | End: 2020-01-01

## 2020-01-01 RX ORDER — ACETAMINOPHEN 325 MG/1
650 TABLET ORAL EVERY 6 HOURS PRN
Status: DISCONTINUED | OUTPATIENT
Start: 2020-01-01 | End: 2020-01-01 | Stop reason: HOSPADM

## 2020-01-01 RX ORDER — FUROSEMIDE 10 MG/ML
40 INJECTION INTRAMUSCULAR; INTRAVENOUS EVERY 8 HOURS
Status: DISCONTINUED | OUTPATIENT
Start: 2020-01-01 | End: 2020-01-01

## 2020-01-01 RX ORDER — BUMETANIDE 0.25 MG/ML
0.5 INJECTION INTRAMUSCULAR; INTRAVENOUS CONTINUOUS
Status: DISCONTINUED | OUTPATIENT
Start: 2020-01-01 | End: 2020-01-01

## 2020-01-01 RX ORDER — METOPROLOL SUCCINATE 25 MG/1
12.5 TABLET, EXTENDED RELEASE ORAL DAILY
Status: DISCONTINUED | OUTPATIENT
Start: 2020-01-01 | End: 2020-01-01

## 2020-01-01 RX ORDER — GUAIFENESIN 600 MG
600 TABLET, EXTENDED RELEASE 12 HR ORAL EVERY 12 HOURS SCHEDULED
Status: DISCONTINUED | OUTPATIENT
Start: 2020-01-01 | End: 2020-01-01

## 2020-01-01 RX ORDER — ACETAMINOPHEN 325 MG/1
650 TABLET ORAL ONCE AS NEEDED
Status: DISCONTINUED | OUTPATIENT
Start: 2020-01-01 | End: 2020-01-01

## 2020-01-01 RX ORDER — ASPIRIN 81 MG/1
324 TABLET, CHEWABLE ORAL ONCE
Status: COMPLETED | OUTPATIENT
Start: 2020-01-01 | End: 2020-01-01

## 2020-01-01 RX ORDER — MORPHINE SULFATE 4 MG/ML
4 INJECTION, SOLUTION INTRAMUSCULAR; INTRAVENOUS ONCE
Status: DISCONTINUED | OUTPATIENT
Start: 2020-01-01 | End: 2020-01-01

## 2020-01-01 RX ORDER — METOPROLOL SUCCINATE 25 MG/1
25 TABLET, EXTENDED RELEASE ORAL DAILY
Status: DISCONTINUED | OUTPATIENT
Start: 2020-01-01 | End: 2020-01-01

## 2020-01-01 RX ORDER — AMOXICILLIN 250 MG
1 CAPSULE ORAL
Status: DISCONTINUED | OUTPATIENT
Start: 2020-01-01 | End: 2020-01-01 | Stop reason: HOSPADM

## 2020-01-01 RX ORDER — FUROSEMIDE 20 MG/1
20 TABLET ORAL ONCE
Status: COMPLETED | OUTPATIENT
Start: 2020-01-01 | End: 2020-01-01

## 2020-01-01 RX ORDER — METOPROLOL SUCCINATE 50 MG/1
50 TABLET, EXTENDED RELEASE ORAL DAILY
Status: DISCONTINUED | OUTPATIENT
Start: 2020-01-01 | End: 2020-01-01 | Stop reason: HOSPADM

## 2020-01-01 RX ORDER — LORAZEPAM 2 MG/ML
0.5 INJECTION INTRAMUSCULAR 4 TIMES DAILY PRN
Status: DISCONTINUED | OUTPATIENT
Start: 2020-01-01 | End: 2020-01-01 | Stop reason: HOSPADM

## 2020-01-01 RX ORDER — HEPARIN SODIUM 1000 [USP'U]/ML
9200 INJECTION, SOLUTION INTRAVENOUS; SUBCUTANEOUS ONCE
Status: COMPLETED | OUTPATIENT
Start: 2020-01-01 | End: 2020-01-01

## 2020-01-01 RX ORDER — METHYLPREDNISOLONE SODIUM SUCCINATE 40 MG/ML
40 INJECTION, POWDER, LYOPHILIZED, FOR SOLUTION INTRAMUSCULAR; INTRAVENOUS EVERY 8 HOURS SCHEDULED
Status: DISCONTINUED | OUTPATIENT
Start: 2020-01-01 | End: 2020-01-01 | Stop reason: HOSPADM

## 2020-01-01 RX ORDER — CEPHALEXIN 250 MG/5ML
250 POWDER, FOR SUSPENSION ORAL EVERY 8 HOURS SCHEDULED
Status: DISCONTINUED | OUTPATIENT
Start: 2020-01-01 | End: 2020-01-01 | Stop reason: HOSPADM

## 2020-01-01 RX ORDER — METOPROLOL SUCCINATE 50 MG/1
50 TABLET, EXTENDED RELEASE ORAL DAILY
Qty: 30 TABLET | Refills: 6 | Status: SHIPPED | OUTPATIENT
Start: 2020-01-01 | End: 2020-01-01 | Stop reason: HOSPADM

## 2020-01-01 RX ORDER — NITROGLYCERIN 0.4 MG/1
0.4 TABLET SUBLINGUAL
Status: DISCONTINUED | OUTPATIENT
Start: 2020-01-01 | End: 2020-01-01

## 2020-01-01 RX ORDER — HEPARIN SODIUM 1000 [USP'U]/ML
9200 INJECTION, SOLUTION INTRAVENOUS; SUBCUTANEOUS
Status: DISCONTINUED | OUTPATIENT
Start: 2020-01-01 | End: 2020-01-01

## 2020-01-01 RX ORDER — ONDANSETRON 2 MG/ML
4 INJECTION INTRAMUSCULAR; INTRAVENOUS EVERY 6 HOURS PRN
Status: DISCONTINUED | OUTPATIENT
Start: 2020-01-01 | End: 2020-01-01 | Stop reason: HOSPADM

## 2020-01-01 RX ORDER — CALCIUM CARBONATE 200(500)MG
1000 TABLET,CHEWABLE ORAL 3 TIMES DAILY PRN
Status: DISCONTINUED | OUTPATIENT
Start: 2020-01-01 | End: 2020-01-01 | Stop reason: HOSPADM

## 2020-01-01 RX ORDER — NITROGLYCERIN 0.4 MG/1
0.4 TABLET SUBLINGUAL
Status: DISCONTINUED | OUTPATIENT
Start: 2020-01-01 | End: 2020-01-01 | Stop reason: HOSPADM

## 2020-01-01 RX ORDER — METOPROLOL TARTRATE 5 MG/5ML
5 INJECTION INTRAVENOUS EVERY 6 HOURS PRN
Status: DISCONTINUED | OUTPATIENT
Start: 2020-01-01 | End: 2020-01-01 | Stop reason: HOSPADM

## 2020-01-01 RX ORDER — LORAZEPAM 2 MG/ML
0.5 INJECTION INTRAMUSCULAR ONCE
Status: COMPLETED | OUTPATIENT
Start: 2020-01-01 | End: 2020-01-01

## 2020-01-01 RX ORDER — HEPARIN SODIUM 10000 [USP'U]/100ML
3-30 INJECTION, SOLUTION INTRAVENOUS
Status: DISCONTINUED | OUTPATIENT
Start: 2020-01-01 | End: 2020-01-01

## 2020-01-01 RX ORDER — DOXYCYCLINE HYCLATE 50 MG/1
324 CAPSULE, GELATIN COATED ORAL
Qty: 30 TABLET | Refills: 0 | Status: SHIPPED | OUTPATIENT
Start: 2020-01-01 | End: 2020-01-01 | Stop reason: HOSPADM

## 2020-01-01 RX ORDER — FUROSEMIDE 10 MG/ML
60 INJECTION INTRAMUSCULAR; INTRAVENOUS
Status: DISCONTINUED | OUTPATIENT
Start: 2020-01-01 | End: 2020-01-01

## 2020-01-01 RX ORDER — FUROSEMIDE 10 MG/ML
20 INJECTION INTRAMUSCULAR; INTRAVENOUS ONCE
Status: COMPLETED | OUTPATIENT
Start: 2020-01-01 | End: 2020-01-01

## 2020-01-01 RX ORDER — DOXYCYCLINE HYCLATE 50 MG/1
324 CAPSULE, GELATIN COATED ORAL
Status: DISCONTINUED | OUTPATIENT
Start: 2020-01-01 | End: 2020-01-01 | Stop reason: HOSPADM

## 2020-01-01 RX ORDER — METOPROLOL SUCCINATE 25 MG/1
25 TABLET, EXTENDED RELEASE ORAL DAILY
Status: DISCONTINUED | OUTPATIENT
Start: 2020-01-01 | End: 2020-01-01 | Stop reason: HOSPADM

## 2020-01-01 RX ORDER — ACETAMINOPHEN 325 MG/1
650 TABLET ORAL EVERY 4 HOURS PRN
Status: DISCONTINUED | OUTPATIENT
Start: 2020-01-01 | End: 2020-01-01 | Stop reason: HOSPADM

## 2020-01-01 RX ORDER — IPRATROPIUM BROMIDE AND ALBUTEROL SULFATE 2.5; .5 MG/3ML; MG/3ML
3 SOLUTION RESPIRATORY (INHALATION) EVERY 6 HOURS PRN
Status: DISCONTINUED | OUTPATIENT
Start: 2020-01-01 | End: 2020-01-01 | Stop reason: HOSPADM

## 2020-01-01 RX ORDER — AMIODARONE HYDROCHLORIDE 200 MG/1
200 TABLET ORAL
Status: DISCONTINUED | OUTPATIENT
Start: 2020-01-01 | End: 2020-01-01 | Stop reason: HOSPADM

## 2020-01-01 RX ORDER — ESMOLOL HYDROCHLORIDE 10 MG/ML
25-200 INJECTION, SOLUTION INTRAVENOUS ONCE
Status: DISCONTINUED | OUTPATIENT
Start: 2020-01-01 | End: 2020-01-01

## 2020-01-01 RX ORDER — HEPARIN SODIUM 1000 [USP'U]/ML
4000 INJECTION, SOLUTION INTRAVENOUS; SUBCUTANEOUS
Status: DISCONTINUED | OUTPATIENT
Start: 2020-01-01 | End: 2020-01-01

## 2020-01-01 RX ORDER — ALBUMIN (HUMAN) 12.5 G/50ML
25 SOLUTION INTRAVENOUS EVERY 6 HOURS
Status: COMPLETED | OUTPATIENT
Start: 2020-01-01 | End: 2020-01-01

## 2020-01-01 RX ORDER — NITROGLYCERIN 0.4 MG/1
0.4 TABLET SUBLINGUAL
Status: CANCELLED | OUTPATIENT
Start: 2020-01-01

## 2020-01-01 RX ORDER — FUROSEMIDE 10 MG/ML
40 INJECTION INTRAMUSCULAR; INTRAVENOUS
Status: DISCONTINUED | OUTPATIENT
Start: 2020-01-01 | End: 2020-01-01

## 2020-01-01 RX ORDER — GUAIFENESIN/DEXTROMETHORPHAN 100-10MG/5
10 SYRUP ORAL EVERY 4 HOURS PRN
Status: DISCONTINUED | OUTPATIENT
Start: 2020-01-01 | End: 2020-01-01

## 2020-01-01 RX ORDER — FUROSEMIDE 10 MG/ML
40 INJECTION INTRAMUSCULAR; INTRAVENOUS ONCE
Status: COMPLETED | OUTPATIENT
Start: 2020-01-01 | End: 2020-01-01

## 2020-01-01 RX ORDER — SENNOSIDES 8.6 MG
1 TABLET ORAL
Status: DISCONTINUED | OUTPATIENT
Start: 2020-01-01 | End: 2020-01-01 | Stop reason: HOSPADM

## 2020-01-01 RX ORDER — METHYLPREDNISOLONE SODIUM SUCCINATE 125 MG/2ML
125 INJECTION, POWDER, LYOPHILIZED, FOR SOLUTION INTRAMUSCULAR; INTRAVENOUS ONCE
Status: COMPLETED | OUTPATIENT
Start: 2020-01-01 | End: 2020-01-01

## 2020-01-01 RX ORDER — NYSTATIN 100000 [USP'U]/G
POWDER TOPICAL 2 TIMES DAILY
Status: CANCELLED | OUTPATIENT
Start: 2020-01-01

## 2020-01-01 RX ORDER — DIGOXIN 125 MCG
125 TABLET ORAL EVERY OTHER DAY
Status: DISCONTINUED | OUTPATIENT
Start: 2020-01-01 | End: 2020-01-01

## 2020-01-01 RX ORDER — ASPIRIN 81 MG/1
81 TABLET, CHEWABLE ORAL DAILY
Status: DISCONTINUED | OUTPATIENT
Start: 2020-01-01 | End: 2020-01-01

## 2020-01-01 RX ORDER — POTASSIUM CHLORIDE 20 MEQ/1
20 TABLET, EXTENDED RELEASE ORAL DAILY
Status: DISCONTINUED | OUTPATIENT
Start: 2020-01-01 | End: 2020-01-01

## 2020-01-01 RX ORDER — GUAIFENESIN/DEXTROMETHORPHAN 100-10MG/5
10 SYRUP ORAL EVERY 4 HOURS PRN
Status: CANCELLED | OUTPATIENT
Start: 2020-01-01

## 2020-01-01 RX ORDER — POTASSIUM CHLORIDE 20 MEQ/1
20 TABLET, EXTENDED RELEASE ORAL
Status: DISCONTINUED | OUTPATIENT
Start: 2020-01-01 | End: 2020-01-01

## 2020-01-01 RX ORDER — CEFAZOLIN SODIUM 1 G/50ML
1000 SOLUTION INTRAVENOUS EVERY 8 HOURS
Status: DISCONTINUED | OUTPATIENT
Start: 2020-01-01 | End: 2020-01-01

## 2020-01-01 RX ORDER — HEPARIN SODIUM 10000 [USP'U]/100ML
3-20 INJECTION, SOLUTION INTRAVENOUS
Status: DISCONTINUED | OUTPATIENT
Start: 2020-01-01 | End: 2020-01-01

## 2020-01-01 RX ORDER — ALBUMIN (HUMAN) 12.5 G/50ML
50 SOLUTION INTRAVENOUS ONCE
Status: COMPLETED | OUTPATIENT
Start: 2020-01-01 | End: 2020-01-01

## 2020-01-01 RX ORDER — GUAIFENESIN 600 MG
600 TABLET, EXTENDED RELEASE 12 HR ORAL EVERY 12 HOURS SCHEDULED
Status: DISCONTINUED | OUTPATIENT
Start: 2020-01-01 | End: 2020-01-01 | Stop reason: HOSPADM

## 2020-01-01 RX ORDER — NITROGLYCERIN 20 MG/100ML
5-200 INJECTION INTRAVENOUS
Status: DISCONTINUED | OUTPATIENT
Start: 2020-01-01 | End: 2020-01-01 | Stop reason: HOSPADM

## 2020-01-01 RX ORDER — FUROSEMIDE 40 MG/1
40 TABLET ORAL DAILY
Status: DISCONTINUED | OUTPATIENT
Start: 2020-01-01 | End: 2020-01-01

## 2020-01-01 RX ORDER — IPRATROPIUM BROMIDE AND ALBUTEROL SULFATE 2.5; .5 MG/3ML; MG/3ML
3 SOLUTION RESPIRATORY (INHALATION) EVERY 6 HOURS PRN
Status: CANCELLED | OUTPATIENT
Start: 2020-01-01

## 2020-01-01 RX ORDER — GUAIFENESIN/DEXTROMETHORPHAN 100-10MG/5
10 SYRUP ORAL EVERY 4 HOURS PRN
Status: DISCONTINUED | OUTPATIENT
Start: 2020-01-01 | End: 2020-01-01 | Stop reason: HOSPADM

## 2020-01-01 RX ORDER — FUROSEMIDE 10 MG/ML
40 INJECTION INTRAMUSCULAR; INTRAVENOUS DAILY
Status: DISCONTINUED | OUTPATIENT
Start: 2020-01-01 | End: 2020-01-01

## 2020-01-01 RX ORDER — FOLIC ACID 1 MG/1
1 TABLET ORAL DAILY
Status: CANCELLED | OUTPATIENT
Start: 2020-01-01

## 2020-01-01 RX ORDER — ALBUMIN (HUMAN) 12.5 G/50ML
25 SOLUTION INTRAVENOUS EVERY 6 HOURS
Status: DISCONTINUED | OUTPATIENT
Start: 2020-01-01 | End: 2020-01-01 | Stop reason: HOSPADM

## 2020-01-01 RX ORDER — METOPROLOL SUCCINATE 50 MG/1
50 TABLET, EXTENDED RELEASE ORAL DAILY
Status: DISCONTINUED | OUTPATIENT
Start: 2020-01-01 | End: 2020-01-01

## 2020-01-01 RX ORDER — TRAMADOL HYDROCHLORIDE 50 MG/1
50 TABLET ORAL ONCE
Status: COMPLETED | OUTPATIENT
Start: 2020-01-01 | End: 2020-01-01

## 2020-01-01 RX ORDER — ALBUMIN (HUMAN) 12.5 G/50ML
12.5 SOLUTION INTRAVENOUS ONCE
Status: COMPLETED | OUTPATIENT
Start: 2020-01-01 | End: 2020-01-01

## 2020-01-01 RX ORDER — FUROSEMIDE 10 MG/ML
60 INJECTION INTRAMUSCULAR; INTRAVENOUS EVERY 8 HOURS
Status: DISCONTINUED | OUTPATIENT
Start: 2020-01-01 | End: 2020-01-01

## 2020-01-01 RX ORDER — ACETAZOLAMIDE 500 MG/5ML
250 INJECTION, POWDER, LYOPHILIZED, FOR SOLUTION INTRAVENOUS ONCE
Status: COMPLETED | OUTPATIENT
Start: 2020-01-01 | End: 2020-01-01

## 2020-01-01 RX ORDER — DOCUSATE SODIUM 100 MG/1
100 CAPSULE, LIQUID FILLED ORAL DAILY
Status: CANCELLED | OUTPATIENT
Start: 2020-01-01

## 2020-01-01 RX ORDER — HEPARIN SODIUM 1000 [USP'U]/ML
4600 INJECTION, SOLUTION INTRAVENOUS; SUBCUTANEOUS
Status: DISCONTINUED | OUTPATIENT
Start: 2020-01-01 | End: 2020-01-01

## 2020-01-01 RX ORDER — ALPRAZOLAM 0.25 MG/1
0.25 TABLET ORAL ONCE
Status: COMPLETED | OUTPATIENT
Start: 2020-01-01 | End: 2020-01-01

## 2020-01-01 RX ORDER — POTASSIUM CHLORIDE 20 MEQ/1
40 TABLET, EXTENDED RELEASE ORAL 2 TIMES DAILY
Status: DISCONTINUED | OUTPATIENT
Start: 2020-01-01 | End: 2020-01-01

## 2020-01-01 RX ORDER — ECHINACEA PURPUREA EXTRACT 125 MG
1 TABLET ORAL EVERY 2 HOUR PRN
Status: DISCONTINUED | OUTPATIENT
Start: 2020-01-01 | End: 2020-01-01 | Stop reason: HOSPADM

## 2020-01-01 RX ORDER — DOCUSATE SODIUM 100 MG/1
100 CAPSULE, LIQUID FILLED ORAL DAILY
Qty: 30 CAPSULE | Refills: 0 | Status: SHIPPED | OUTPATIENT
Start: 2020-01-01 | End: 2020-01-01 | Stop reason: HOSPADM

## 2020-01-01 RX ORDER — HEPARIN SODIUM 1000 [USP'U]/ML
2000 INJECTION, SOLUTION INTRAVENOUS; SUBCUTANEOUS
Status: DISCONTINUED | OUTPATIENT
Start: 2020-01-01 | End: 2020-01-01

## 2020-01-01 RX ORDER — FUROSEMIDE 40 MG/1
40 TABLET ORAL
Status: DISCONTINUED | OUTPATIENT
Start: 2020-01-01 | End: 2020-01-01

## 2020-01-01 RX ORDER — FUROSEMIDE 20 MG/1
20 TABLET ORAL ONCE
Status: DISCONTINUED | OUTPATIENT
Start: 2020-01-01 | End: 2020-01-01

## 2020-01-01 RX ORDER — ASPIRIN 81 MG/1
81 TABLET, CHEWABLE ORAL DAILY
Status: CANCELLED | OUTPATIENT
Start: 2020-01-01

## 2020-01-01 RX ORDER — IPRATROPIUM BROMIDE AND ALBUTEROL SULFATE 2.5; .5 MG/3ML; MG/3ML
3 SOLUTION RESPIRATORY (INHALATION) EVERY 6 HOURS PRN
Status: DISCONTINUED | OUTPATIENT
Start: 2020-01-01 | End: 2020-01-01

## 2020-01-01 RX ORDER — PERMETHRIN 50 MG/G
CREAM TOPICAL ONCE
Status: DISCONTINUED | OUTPATIENT
Start: 2020-01-01 | End: 2020-01-01 | Stop reason: DRUGHIGH

## 2020-01-01 RX ORDER — BUMETANIDE 0.25 MG/ML
2 INJECTION, SOLUTION INTRAMUSCULAR; INTRAVENOUS 2 TIMES DAILY
Status: DISCONTINUED | OUTPATIENT
Start: 2020-01-01 | End: 2020-01-01

## 2020-01-01 RX ORDER — HYDROMORPHONE HCL/PF 1 MG/ML
1 SYRINGE (ML) INJECTION
Status: DISCONTINUED | OUTPATIENT
Start: 2020-01-01 | End: 2020-01-01 | Stop reason: HOSPADM

## 2020-01-01 RX ORDER — IPRATROPIUM BROMIDE AND ALBUTEROL SULFATE 2.5; .5 MG/3ML; MG/3ML
3 SOLUTION RESPIRATORY (INHALATION)
Status: DISCONTINUED | OUTPATIENT
Start: 2020-01-01 | End: 2020-01-01 | Stop reason: HOSPADM

## 2020-01-01 RX ADMIN — AMIODARONE HYDROCHLORIDE 400 MG: 200 TABLET ORAL at 21:41

## 2020-01-01 RX ADMIN — HEPARIN SODIUM 9200 UNITS: 1000 INJECTION INTRAVENOUS; SUBCUTANEOUS at 05:26

## 2020-01-01 RX ADMIN — METOPROLOL TARTRATE 12.5 MG: 25 TABLET ORAL at 09:04

## 2020-01-01 RX ADMIN — FUROSEMIDE 40 MG: 40 TABLET ORAL at 08:42

## 2020-01-01 RX ADMIN — POTASSIUM CHLORIDE 20 MEQ: 1500 TABLET, EXTENDED RELEASE ORAL at 12:13

## 2020-01-01 RX ADMIN — FOLIC ACID 1 MG: 1 TABLET ORAL at 09:30

## 2020-01-01 RX ADMIN — FOLIC ACID 1 MG: 1 TABLET ORAL at 09:28

## 2020-01-01 RX ADMIN — FOLIC ACID 1 MG: 1 TABLET ORAL at 08:01

## 2020-01-01 RX ADMIN — FUROSEMIDE 60 MG: 10 INJECTION, SOLUTION INTRAVENOUS at 16:10

## 2020-01-01 RX ADMIN — METOPROLOL TARTRATE 12.5 MG: 25 TABLET ORAL at 08:27

## 2020-01-01 RX ADMIN — LEVALBUTEROL 1.25 MG: 1.25 SOLUTION, CONCENTRATE RESPIRATORY (INHALATION) at 20:56

## 2020-01-01 RX ADMIN — ENOXAPARIN SODIUM 40 MG: 40 INJECTION SUBCUTANEOUS at 08:06

## 2020-01-01 RX ADMIN — NYSTATIN 1 APPLICATION: 100000 POWDER TOPICAL at 17:52

## 2020-01-01 RX ADMIN — APIXABAN 5 MG: 5 TABLET, FILM COATED ORAL at 08:57

## 2020-01-01 RX ADMIN — AMIODARONE HYDROCHLORIDE 0.5 MG/MIN: 50 INJECTION, SOLUTION INTRAVENOUS at 13:36

## 2020-01-01 RX ADMIN — FERROUS GLUCONATE 324 MG: 324 TABLET ORAL at 08:04

## 2020-01-01 RX ADMIN — ASPIRIN 81 MG 81 MG: 81 TABLET ORAL at 09:09

## 2020-01-01 RX ADMIN — AMIODARONE HYDROCHLORIDE 200 MG: 200 TABLET ORAL at 08:29

## 2020-01-01 RX ADMIN — METOPROLOL SUCCINATE 25 MG: 25 TABLET, EXTENDED RELEASE ORAL at 09:29

## 2020-01-01 RX ADMIN — NYSTATIN: 100000 POWDER TOPICAL at 09:48

## 2020-01-01 RX ADMIN — DIGOXIN 125 MCG: 125 TABLET ORAL at 12:35

## 2020-01-01 RX ADMIN — METOPROLOL TARTRATE 12.5 MG: 25 TABLET ORAL at 21:38

## 2020-01-01 RX ADMIN — APIXABAN 5 MG: 5 TABLET, FILM COATED ORAL at 08:36

## 2020-01-01 RX ADMIN — AMIODARONE HYDROCHLORIDE 150 MG: 50 INJECTION, SOLUTION INTRAVENOUS at 12:44

## 2020-01-01 RX ADMIN — APIXABAN 5 MG: 5 TABLET, FILM COATED ORAL at 17:45

## 2020-01-01 RX ADMIN — GUAIFENESIN 600 MG: 600 TABLET ORAL at 08:04

## 2020-01-01 RX ADMIN — FERROUS GLUCONATE 324 MG: 324 TABLET ORAL at 07:30

## 2020-01-01 RX ADMIN — FOLIC ACID 1 MG: 1 TABLET ORAL at 08:56

## 2020-01-01 RX ADMIN — POTASSIUM CHLORIDE 20 MEQ: 1500 TABLET, EXTENDED RELEASE ORAL at 17:41

## 2020-01-01 RX ADMIN — METOPROLOL TARTRATE 12.5 MG: 25 TABLET ORAL at 08:14

## 2020-01-01 RX ADMIN — CYANOCOBALAMIN TAB 500 MCG 1000 MCG: 500 TAB at 08:56

## 2020-01-01 RX ADMIN — CYANOCOBALAMIN TAB 1000 MCG 1000 MCG: 1000 TAB at 09:32

## 2020-01-01 RX ADMIN — ASPIRIN 81 MG 81 MG: 81 TABLET ORAL at 09:04

## 2020-01-01 RX ADMIN — AMIODARONE HYDROCHLORIDE 200 MG: 200 TABLET ORAL at 08:56

## 2020-01-01 RX ADMIN — FUROSEMIDE 60 MG: 10 INJECTION, SOLUTION INTRAVENOUS at 05:41

## 2020-01-01 RX ADMIN — NYSTATIN: 100000 POWDER TOPICAL at 17:17

## 2020-01-01 RX ADMIN — FUROSEMIDE 20 MG: 10 INJECTION, SOLUTION INTRAVENOUS at 10:56

## 2020-01-01 RX ADMIN — FUROSEMIDE 40 MG: 40 TABLET ORAL at 09:23

## 2020-01-01 RX ADMIN — ASPIRIN 81 MG 81 MG: 81 TABLET ORAL at 09:35

## 2020-01-01 RX ADMIN — NITROGLYCERIN 30 MCG/MIN: 20 INJECTION INTRAVENOUS at 04:45

## 2020-01-01 RX ADMIN — NYSTATIN: 100000 POWDER TOPICAL at 09:18

## 2020-01-01 RX ADMIN — FUROSEMIDE 40 MG: 40 TABLET ORAL at 09:32

## 2020-01-01 RX ADMIN — METOPROLOL SUCCINATE 25 MG: 25 TABLET, EXTENDED RELEASE ORAL at 08:14

## 2020-01-01 RX ADMIN — CYANOCOBALAMIN TAB 1000 MCG 1000 MCG: 1000 TAB at 09:09

## 2020-01-01 RX ADMIN — DIGOXIN 125 MCG: 125 TABLET ORAL at 08:36

## 2020-01-01 RX ADMIN — APIXABAN 5 MG: 5 TABLET, FILM COATED ORAL at 17:47

## 2020-01-01 RX ADMIN — BUMETANIDE 2 MG: 0.25 INJECTION INTRAMUSCULAR; INTRAVENOUS at 14:49

## 2020-01-01 RX ADMIN — ASPIRIN 81 MG 81 MG: 81 TABLET ORAL at 08:27

## 2020-01-01 RX ADMIN — METOPROLOL TARTRATE 12.5 MG: 25 TABLET ORAL at 09:08

## 2020-01-01 RX ADMIN — CEPHALEXIN 250 MG: 250 FOR SUSPENSION ORAL at 21:40

## 2020-01-01 RX ADMIN — FUROSEMIDE 40 MG: 10 INJECTION, SOLUTION INTRAMUSCULAR; INTRAVENOUS at 08:37

## 2020-01-01 RX ADMIN — ACETAZOLAMIDE 250 MG: 250 TABLET ORAL at 09:52

## 2020-01-01 RX ADMIN — ASPIRIN 81 MG 81 MG: 81 TABLET ORAL at 10:22

## 2020-01-01 RX ADMIN — CEFAZOLIN SODIUM 1000 MG: 1 SOLUTION INTRAVENOUS at 05:45

## 2020-01-01 RX ADMIN — FERROUS GLUCONATE 324 MG: 324 TABLET ORAL at 06:30

## 2020-01-01 RX ADMIN — ENOXAPARIN SODIUM 30 MG: 30 INJECTION SUBCUTANEOUS at 08:08

## 2020-01-01 RX ADMIN — IPRATROPIUM BROMIDE AND ALBUTEROL SULFATE 3 ML: 2.5; .5 SOLUTION RESPIRATORY (INHALATION) at 19:06

## 2020-01-01 RX ADMIN — METOPROLOL TARTRATE 12.5 MG: 25 TABLET ORAL at 21:17

## 2020-01-01 RX ADMIN — ASPIRIN 81 MG 81 MG: 81 TABLET ORAL at 08:42

## 2020-01-01 RX ADMIN — APIXABAN 5 MG: 5 TABLET, FILM COATED ORAL at 17:28

## 2020-01-01 RX ADMIN — CYANOCOBALAMIN TAB 1000 MCG 1000 MCG: 1000 TAB at 10:06

## 2020-01-01 RX ADMIN — FERROUS GLUCONATE 324 MG: 324 TABLET ORAL at 09:29

## 2020-01-01 RX ADMIN — ASPIRIN 81 MG 81 MG: 81 TABLET ORAL at 08:22

## 2020-01-01 RX ADMIN — BENZONATATE 100 MG: 100 CAPSULE ORAL at 21:22

## 2020-01-01 RX ADMIN — APIXABAN 5 MG: 5 TABLET, FILM COATED ORAL at 08:37

## 2020-01-01 RX ADMIN — ENOXAPARIN SODIUM 30 MG: 30 INJECTION SUBCUTANEOUS at 09:23

## 2020-01-01 RX ADMIN — APIXABAN 5 MG: 5 TABLET, FILM COATED ORAL at 17:06

## 2020-01-01 RX ADMIN — ASPIRIN 81 MG 81 MG: 81 TABLET ORAL at 08:06

## 2020-01-01 RX ADMIN — GUAIFENESIN AND DEXTROMETHORPHAN 10 ML: 100; 10 SYRUP ORAL at 21:22

## 2020-01-01 RX ADMIN — METOPROLOL TARTRATE 12.5 MG: 25 TABLET ORAL at 10:08

## 2020-01-01 RX ADMIN — FERROUS GLUCONATE 324 MG: 324 TABLET ORAL at 05:16

## 2020-01-01 RX ADMIN — ALBUMIN (HUMAN) 25 G: 0.25 INJECTION, SOLUTION INTRAVENOUS at 00:30

## 2020-01-01 RX ADMIN — IODIXANOL 100 ML: 320 INJECTION, SOLUTION INTRAVASCULAR at 05:25

## 2020-01-01 RX ADMIN — LORAZEPAM 0.5 MG: 2 INJECTION INTRAMUSCULAR; INTRAVENOUS at 21:06

## 2020-01-01 RX ADMIN — AMIODARONE HYDROCHLORIDE 400 MG: 200 TABLET ORAL at 15:27

## 2020-01-01 RX ADMIN — HYDROMORPHONE HYDROCHLORIDE 1 MG: 1 INJECTION, SOLUTION INTRAMUSCULAR; INTRAVENOUS; SUBCUTANEOUS at 07:28

## 2020-01-01 RX ADMIN — POTASSIUM CHLORIDE 20 MEQ: 1500 TABLET, EXTENDED RELEASE ORAL at 15:50

## 2020-01-01 RX ADMIN — DOCUSATE SODIUM 100 MG: 100 CAPSULE, LIQUID FILLED ORAL at 09:34

## 2020-01-01 RX ADMIN — Medication 10 MG/HR: at 11:49

## 2020-01-01 RX ADMIN — FOLIC ACID 1 MG: 1 TABLET ORAL at 16:10

## 2020-01-01 RX ADMIN — BENZONATATE 100 MG: 100 CAPSULE ORAL at 00:34

## 2020-01-01 RX ADMIN — POTASSIUM CHLORIDE 20 MEQ: 1500 TABLET, EXTENDED RELEASE ORAL at 08:14

## 2020-01-01 RX ADMIN — CYANOCOBALAMIN TAB 1000 MCG 1000 MCG: 1000 TAB at 08:07

## 2020-01-01 RX ADMIN — GUAIFENESIN AND DEXTROMETHORPHAN 10 ML: 100; 10 SYRUP ORAL at 09:23

## 2020-01-01 RX ADMIN — METOPROLOL TARTRATE 12.5 MG: 25 TABLET ORAL at 08:04

## 2020-01-01 RX ADMIN — LORAZEPAM 0.5 MG: 2 INJECTION INTRAMUSCULAR; INTRAVENOUS at 07:28

## 2020-01-01 RX ADMIN — ACETAZOLAMIDE 500 MG: 250 TABLET ORAL at 14:17

## 2020-01-01 RX ADMIN — DOCUSATE SODIUM 100 MG: 100 CAPSULE, LIQUID FILLED ORAL at 09:47

## 2020-01-01 RX ADMIN — NYSTATIN 1 APPLICATION: 100000 POWDER TOPICAL at 17:59

## 2020-01-01 RX ADMIN — CYANOCOBALAMIN TAB 1000 MCG 1000 MCG: 1000 TAB at 08:04

## 2020-01-01 RX ADMIN — FOLIC ACID 1 MG: 1 TABLET ORAL at 08:05

## 2020-01-01 RX ADMIN — FUROSEMIDE 40 MG: 10 INJECTION, SOLUTION INTRAMUSCULAR; INTRAVENOUS at 09:49

## 2020-01-01 RX ADMIN — DOCUSATE SODIUM 100 MG: 100 CAPSULE, LIQUID FILLED ORAL at 08:05

## 2020-01-01 RX ADMIN — BENZONATATE 100 MG: 100 CAPSULE ORAL at 21:17

## 2020-01-01 RX ADMIN — CEFAZOLIN SODIUM 1000 MG: 1 SOLUTION INTRAVENOUS at 21:17

## 2020-01-01 RX ADMIN — NYSTATIN: 100000 POWDER TOPICAL at 17:00

## 2020-01-01 RX ADMIN — DOCUSATE SODIUM 100 MG: 100 CAPSULE, LIQUID FILLED ORAL at 09:09

## 2020-01-01 RX ADMIN — FOLIC ACID 1 MG: 1 TABLET ORAL at 09:04

## 2020-01-01 RX ADMIN — METOPROLOL TARTRATE 12.5 MG: 25 TABLET ORAL at 11:50

## 2020-01-01 RX ADMIN — FOLIC ACID 1 MG: 1 TABLET ORAL at 08:22

## 2020-01-01 RX ADMIN — BISACODYL 10 MG: 5 TABLET, COATED ORAL at 09:16

## 2020-01-01 RX ADMIN — DIGOXIN 125 MCG: 125 TABLET ORAL at 17:40

## 2020-01-01 RX ADMIN — CEFAZOLIN SODIUM 1000 MG: 1 SOLUTION INTRAVENOUS at 06:09

## 2020-01-01 RX ADMIN — DOCUSATE SODIUM 100 MG: 100 CAPSULE, LIQUID FILLED ORAL at 09:31

## 2020-01-01 RX ADMIN — FUROSEMIDE 40 MG: 10 INJECTION, SOLUTION INTRAMUSCULAR; INTRAVENOUS at 17:10

## 2020-01-01 RX ADMIN — APIXABAN 5 MG: 5 TABLET, FILM COATED ORAL at 18:08

## 2020-01-01 RX ADMIN — CEFAZOLIN SODIUM 1000 MG: 1 SOLUTION INTRAVENOUS at 21:32

## 2020-01-01 RX ADMIN — NYSTATIN: 100000 POWDER TOPICAL at 17:45

## 2020-01-01 RX ADMIN — FERROUS GLUCONATE 324 MG: 324 TABLET ORAL at 06:13

## 2020-01-01 RX ADMIN — NYSTATIN: 100000 POWDER TOPICAL at 08:22

## 2020-01-01 RX ADMIN — BENZONATATE 100 MG: 100 CAPSULE ORAL at 01:20

## 2020-01-01 RX ADMIN — BENZONATATE 100 MG: 100 CAPSULE ORAL at 02:03

## 2020-01-01 RX ADMIN — POTASSIUM CHLORIDE 20 MEQ: 1500 TABLET, EXTENDED RELEASE ORAL at 13:19

## 2020-01-01 RX ADMIN — METOPROLOL TARTRATE 12.5 MG: 25 TABLET ORAL at 21:26

## 2020-01-01 RX ADMIN — CEPHALEXIN 250 MG: 250 FOR SUSPENSION ORAL at 22:45

## 2020-01-01 RX ADMIN — FUROSEMIDE 40 MG: 10 INJECTION, SOLUTION INTRAMUSCULAR; INTRAVENOUS at 08:51

## 2020-01-01 RX ADMIN — POTASSIUM CHLORIDE 20 MEQ: 1500 TABLET, EXTENDED RELEASE ORAL at 12:54

## 2020-01-01 RX ADMIN — FERROUS GLUCONATE 324 MG: 324 TABLET ORAL at 09:04

## 2020-01-01 RX ADMIN — METOPROLOL TARTRATE 12.5 MG: 25 TABLET ORAL at 09:47

## 2020-01-01 RX ADMIN — GUAIFENESIN AND DEXTROMETHORPHAN 10 ML: 100; 10 SYRUP ORAL at 11:48

## 2020-01-01 RX ADMIN — NYSTATIN: 100000 POWDER TOPICAL at 18:02

## 2020-01-01 RX ADMIN — NYSTATIN: 100000 POWDER TOPICAL at 18:11

## 2020-01-01 RX ADMIN — APIXABAN 5 MG: 5 TABLET, FILM COATED ORAL at 17:00

## 2020-01-01 RX ADMIN — METOPROLOL SUCCINATE 25 MG: 25 TABLET, EXTENDED RELEASE ORAL at 08:00

## 2020-01-01 RX ADMIN — ENOXAPARIN SODIUM 30 MG: 30 INJECTION SUBCUTANEOUS at 08:07

## 2020-01-01 RX ADMIN — CEPHALEXIN 250 MG: 250 FOR SUSPENSION ORAL at 14:17

## 2020-01-01 RX ADMIN — CEPHALEXIN 250 MG: 250 FOR SUSPENSION ORAL at 22:55

## 2020-01-01 RX ADMIN — APIXABAN 5 MG: 5 TABLET, FILM COATED ORAL at 17:07

## 2020-01-01 RX ADMIN — METOPROLOL SUCCINATE 25 MG: 25 TABLET, EXTENDED RELEASE ORAL at 08:36

## 2020-01-01 RX ADMIN — SENNOSIDES AND DOCUSATE SODIUM 1 TABLET: 8.6; 5 TABLET ORAL at 21:07

## 2020-01-01 RX ADMIN — ACETAZOLAMIDE 250 MG: 500 INJECTION, POWDER, LYOPHILIZED, FOR SOLUTION INTRAVENOUS at 10:24

## 2020-01-01 RX ADMIN — ASPIRIN 81 MG 81 MG: 81 TABLET ORAL at 08:43

## 2020-01-01 RX ADMIN — METOPROLOL TARTRATE 12.5 MG: 25 TABLET ORAL at 21:53

## 2020-01-01 RX ADMIN — FUROSEMIDE 40 MG: 10 INJECTION, SOLUTION INTRAMUSCULAR; INTRAVENOUS at 09:41

## 2020-01-01 RX ADMIN — APIXABAN 5 MG: 5 TABLET, FILM COATED ORAL at 12:56

## 2020-01-01 RX ADMIN — IPRATROPIUM BROMIDE AND ALBUTEROL SULFATE 3 ML: 2.5; .5 SOLUTION RESPIRATORY (INHALATION) at 12:28

## 2020-01-01 RX ADMIN — NYSTATIN: 100000 POWDER TOPICAL at 17:22

## 2020-01-01 RX ADMIN — NITROGLYCERIN 1 INCH: 20 OINTMENT TOPICAL at 17:58

## 2020-01-01 RX ADMIN — AMIODARONE HYDROCHLORIDE 1 MG/MIN: 50 INJECTION, SOLUTION INTRAVENOUS at 13:02

## 2020-01-01 RX ADMIN — FUROSEMIDE 40 MG: 40 TABLET ORAL at 09:15

## 2020-01-01 RX ADMIN — APIXABAN 5 MG: 5 TABLET, FILM COATED ORAL at 17:10

## 2020-01-01 RX ADMIN — TORSEMIDE 20 MG: 20 TABLET ORAL at 16:26

## 2020-01-01 RX ADMIN — METOPROLOL TARTRATE 12.5 MG: 25 TABLET ORAL at 10:22

## 2020-01-01 RX ADMIN — METOPROLOL TARTRATE 12.5 MG: 25 TABLET ORAL at 09:23

## 2020-01-01 RX ADMIN — FUROSEMIDE 20 MG: 20 TABLET ORAL at 15:48

## 2020-01-01 RX ADMIN — CYANOCOBALAMIN TAB 1000 MCG 1000 MCG: 1000 TAB at 09:47

## 2020-01-01 RX ADMIN — NYSTATIN: 100000 POWDER TOPICAL at 17:39

## 2020-01-01 RX ADMIN — NYSTATIN: 100000 POWDER TOPICAL at 08:42

## 2020-01-01 RX ADMIN — NYSTATIN: 100000 POWDER TOPICAL at 17:07

## 2020-01-01 RX ADMIN — FERROUS GLUCONATE 324 MG: 324 TABLET ORAL at 09:11

## 2020-01-01 RX ADMIN — IPRATROPIUM BROMIDE AND ALBUTEROL SULFATE 3 ML: 2.5; .5 SOLUTION RESPIRATORY (INHALATION) at 11:07

## 2020-01-01 RX ADMIN — BENZONATATE 100 MG: 100 CAPSULE ORAL at 21:47

## 2020-01-01 RX ADMIN — NYSTATIN: 100000 POWDER TOPICAL at 17:41

## 2020-01-01 RX ADMIN — APIXABAN 5 MG: 5 TABLET, FILM COATED ORAL at 08:12

## 2020-01-01 RX ADMIN — BENZONATATE 100 MG: 100 CAPSULE ORAL at 08:30

## 2020-01-01 RX ADMIN — FERROUS GLUCONATE 324 MG: 324 TABLET ORAL at 06:20

## 2020-01-01 RX ADMIN — FOLIC ACID 1 MG: 1 TABLET ORAL at 10:36

## 2020-01-01 RX ADMIN — METOPROLOL SUCCINATE 25 MG: 25 TABLET, EXTENDED RELEASE ORAL at 08:56

## 2020-01-01 RX ADMIN — DILTIAZEM HYDROCHLORIDE 120 MG: 120 CAPSULE, COATED, EXTENDED RELEASE ORAL at 11:49

## 2020-01-01 RX ADMIN — ESMOLOL HYDROCHLORIDE 25 MCG/KG/MIN: 10 INJECTION INTRAVENOUS at 03:54

## 2020-01-01 RX ADMIN — NYSTATIN: 100000 POWDER TOPICAL at 08:56

## 2020-01-01 RX ADMIN — TORSEMIDE 20 MG: 20 TABLET ORAL at 08:29

## 2020-01-01 RX ADMIN — NYSTATIN: 100000 POWDER TOPICAL at 08:01

## 2020-01-01 RX ADMIN — ACETAZOLAMIDE 500 MG: 250 TABLET ORAL at 11:54

## 2020-01-01 RX ADMIN — ACETAMINOPHEN 975 MG: 325 TABLET ORAL at 21:43

## 2020-01-01 RX ADMIN — APIXABAN 5 MG: 5 TABLET, FILM COATED ORAL at 09:23

## 2020-01-01 RX ADMIN — ENOXAPARIN SODIUM 30 MG: 30 INJECTION SUBCUTANEOUS at 09:04

## 2020-01-01 RX ADMIN — FUROSEMIDE 20 MG: 10 INJECTION, SOLUTION INTRAMUSCULAR; INTRAVENOUS at 12:00

## 2020-01-01 RX ADMIN — BUMETANIDE 2 MG: 0.25 INJECTION INTRAMUSCULAR; INTRAVENOUS at 21:09

## 2020-01-01 RX ADMIN — ALBUMIN (HUMAN) 25 G: 0.25 INJECTION, SOLUTION INTRAVENOUS at 18:00

## 2020-01-01 RX ADMIN — CEPHALEXIN 250 MG: 250 FOR SUSPENSION ORAL at 13:00

## 2020-01-01 RX ADMIN — FERROUS GLUCONATE 324 MG: 324 TABLET ORAL at 08:42

## 2020-01-01 RX ADMIN — APIXABAN 5 MG: 5 TABLET, FILM COATED ORAL at 17:37

## 2020-01-01 RX ADMIN — METOPROLOL SUCCINATE 25 MG: 25 TABLET, EXTENDED RELEASE ORAL at 09:23

## 2020-01-01 RX ADMIN — NYSTATIN: 100000 POWDER TOPICAL at 18:16

## 2020-01-01 RX ADMIN — SENNOSIDES AND DOCUSATE SODIUM 1 TABLET: 8.6; 5 TABLET ORAL at 21:30

## 2020-01-01 RX ADMIN — CYANOCOBALAMIN TAB 1000 MCG 1000 MCG: 1000 TAB at 10:56

## 2020-01-01 RX ADMIN — NYSTATIN: 100000 POWDER TOPICAL at 17:43

## 2020-01-01 RX ADMIN — GUAIFENESIN AND DEXTROMETHORPHAN 10 ML: 100; 10 SYRUP ORAL at 08:30

## 2020-01-01 RX ADMIN — AMIODARONE HYDROCHLORIDE 200 MG: 200 TABLET ORAL at 08:12

## 2020-01-01 RX ADMIN — NYSTATIN: 100000 POWDER TOPICAL at 08:18

## 2020-01-01 RX ADMIN — CEFAZOLIN SODIUM 1000 MG: 1 SOLUTION INTRAVENOUS at 12:02

## 2020-01-01 RX ADMIN — DOCUSATE SODIUM 100 MG: 100 CAPSULE, LIQUID FILLED ORAL at 08:42

## 2020-01-01 RX ADMIN — AMIODARONE HYDROCHLORIDE 400 MG: 200 TABLET ORAL at 12:01

## 2020-01-01 RX ADMIN — CYANOCOBALAMIN TAB 1000 MCG 1000 MCG: 1000 TAB at 10:22

## 2020-01-01 RX ADMIN — FUROSEMIDE 80 MG: 10 INJECTION, SOLUTION INTRAMUSCULAR; INTRAVENOUS at 01:21

## 2020-01-01 RX ADMIN — FUROSEMIDE 40 MG: 10 INJECTION, SOLUTION INTRAMUSCULAR; INTRAVENOUS at 09:17

## 2020-01-01 RX ADMIN — FERROUS GLUCONATE TAB 324 MG (37.5 MG ELEMENTAL IRON) 324 MG: 324 (37.5 FE) TAB at 09:34

## 2020-01-01 RX ADMIN — METOPROLOL TARTRATE 12.5 MG: 25 TABLET ORAL at 20:49

## 2020-01-01 RX ADMIN — Medication: at 12:24

## 2020-01-01 RX ADMIN — FOLIC ACID 1 MG: 1 TABLET ORAL at 09:47

## 2020-01-01 RX ADMIN — NYSTATIN: 100000 POWDER TOPICAL at 09:23

## 2020-01-01 RX ADMIN — METOPROLOL TARTRATE 12.5 MG: 25 TABLET ORAL at 21:39

## 2020-01-01 RX ADMIN — BENZONATATE 100 MG: 100 CAPSULE ORAL at 21:09

## 2020-01-01 RX ADMIN — METOPROLOL TARTRATE 12.5 MG: 25 TABLET ORAL at 22:16

## 2020-01-01 RX ADMIN — NYSTATIN: 100000 POWDER TOPICAL at 09:58

## 2020-01-01 RX ADMIN — DILTIAZEM HYDROCHLORIDE 120 MG: 120 CAPSULE, COATED, EXTENDED RELEASE ORAL at 12:04

## 2020-01-01 RX ADMIN — FERROUS GLUCONATE 324 MG: 324 TABLET ORAL at 06:44

## 2020-01-01 RX ADMIN — DOCUSATE SODIUM 100 MG: 100 CAPSULE, LIQUID FILLED ORAL at 08:27

## 2020-01-01 RX ADMIN — POTASSIUM CHLORIDE 20 MEQ: 1500 TABLET, EXTENDED RELEASE ORAL at 17:06

## 2020-01-01 RX ADMIN — DIGOXIN 125 MCG: 125 TABLET ORAL at 09:23

## 2020-01-01 RX ADMIN — FUROSEMIDE 40 MG: 40 TABLET ORAL at 19:46

## 2020-01-01 RX ADMIN — ENOXAPARIN SODIUM 30 MG: 30 INJECTION SUBCUTANEOUS at 08:14

## 2020-01-01 RX ADMIN — CYANOCOBALAMIN TAB 1000 MCG 1000 MCG: 1000 TAB at 08:22

## 2020-01-01 RX ADMIN — FUROSEMIDE 80 MG: 10 INJECTION, SOLUTION INTRAMUSCULAR; INTRAVENOUS at 17:57

## 2020-01-01 RX ADMIN — FERROUS GLUCONATE 324 MG: 324 TABLET ORAL at 06:09

## 2020-01-01 RX ADMIN — AMIODARONE HYDROCHLORIDE 0.5 MG/MIN: 50 INJECTION, SOLUTION INTRAVENOUS at 19:26

## 2020-01-01 RX ADMIN — METOPROLOL TARTRATE 12.5 MG: 25 TABLET ORAL at 21:09

## 2020-01-01 RX ADMIN — CYANOCOBALAMIN TAB 500 MCG 1000 MCG: 500 TAB at 08:29

## 2020-01-01 RX ADMIN — AMIODARONE HYDROCHLORIDE 400 MG: 200 TABLET ORAL at 17:41

## 2020-01-01 RX ADMIN — FERROUS GLUCONATE 324 MG: 324 TABLET ORAL at 06:28

## 2020-01-01 RX ADMIN — NYSTATIN: 100000 POWDER TOPICAL at 08:28

## 2020-01-01 RX ADMIN — CEPHALEXIN 250 MG: 250 FOR SUSPENSION ORAL at 05:15

## 2020-01-01 RX ADMIN — POTASSIUM CHLORIDE 20 MEQ: 20 TABLET, EXTENDED RELEASE ORAL at 08:00

## 2020-01-01 RX ADMIN — METOPROLOL SUCCINATE 25 MG: 25 TABLET, EXTENDED RELEASE ORAL at 08:12

## 2020-01-01 RX ADMIN — POTASSIUM CHLORIDE 20 MEQ: 1500 TABLET, EXTENDED RELEASE ORAL at 15:31

## 2020-01-01 RX ADMIN — AMIODARONE HYDROCHLORIDE 400 MG: 200 TABLET ORAL at 16:26

## 2020-01-01 RX ADMIN — METOPROLOL TARTRATE 12.5 MG: 25 TABLET ORAL at 10:36

## 2020-01-01 RX ADMIN — CYANOCOBALAMIN TAB 1000 MCG 1000 MCG: 1000 TAB at 08:01

## 2020-01-01 RX ADMIN — FERROUS GLUCONATE 324 MG: 324 TABLET ORAL at 09:09

## 2020-01-01 RX ADMIN — CYANOCOBALAMIN TAB 1000 MCG 1000 MCG: 1000 TAB at 08:43

## 2020-01-01 RX ADMIN — NYSTATIN: 100000 POWDER TOPICAL at 09:33

## 2020-01-01 RX ADMIN — BISACODYL 10 MG: 5 TABLET, COATED ORAL at 09:33

## 2020-01-01 RX ADMIN — APIXABAN 5 MG: 5 TABLET, FILM COATED ORAL at 08:14

## 2020-01-01 RX ADMIN — IPRATROPIUM BROMIDE AND ALBUTEROL SULFATE 3 ML: 2.5; .5 SOLUTION RESPIRATORY (INHALATION) at 08:24

## 2020-01-01 RX ADMIN — NYSTATIN: 100000 POWDER TOPICAL at 08:43

## 2020-01-01 RX ADMIN — METOPROLOL TARTRATE 12.5 MG: 25 TABLET ORAL at 09:15

## 2020-01-01 RX ADMIN — FERROUS GLUCONATE TAB 324 MG (37.5 MG ELEMENTAL IRON) 324 MG: 324 (37.5 FE) TAB at 06:09

## 2020-01-01 RX ADMIN — POTASSIUM CHLORIDE 20 MEQ: 1500 TABLET, EXTENDED RELEASE ORAL at 11:43

## 2020-01-01 RX ADMIN — METOPROLOL SUCCINATE 25 MG: 25 TABLET, EXTENDED RELEASE ORAL at 08:57

## 2020-01-01 RX ADMIN — ALBUMIN (HUMAN) 25 G: 0.25 INJECTION, SOLUTION INTRAVENOUS at 15:22

## 2020-01-01 RX ADMIN — POTASSIUM CHLORIDE 40 MEQ: 1500 TABLET, EXTENDED RELEASE ORAL at 21:30

## 2020-01-01 RX ADMIN — SODIUM CHLORIDE 500 ML: 0.9 INJECTION, SOLUTION INTRAVENOUS at 06:16

## 2020-01-01 RX ADMIN — METOPROLOL SUCCINATE 50 MG: 50 TABLET, EXTENDED RELEASE ORAL at 09:23

## 2020-01-01 RX ADMIN — CEPHALEXIN 250 MG: 250 FOR SUSPENSION ORAL at 16:26

## 2020-01-01 RX ADMIN — POTASSIUM CHLORIDE 20 MEQ: 1500 TABLET, EXTENDED RELEASE ORAL at 08:29

## 2020-01-01 RX ADMIN — AMIODARONE HYDROCHLORIDE 400 MG: 200 TABLET ORAL at 09:27

## 2020-01-01 RX ADMIN — BENZONATATE 100 MG: 100 CAPSULE ORAL at 21:40

## 2020-01-01 RX ADMIN — NYSTATIN: 100000 POWDER TOPICAL at 17:20

## 2020-01-01 RX ADMIN — FUROSEMIDE 40 MG: 10 INJECTION, SOLUTION INTRAMUSCULAR; INTRAVENOUS at 17:28

## 2020-01-01 RX ADMIN — POTASSIUM CHLORIDE 20 MEQ: 1500 TABLET, EXTENDED RELEASE ORAL at 08:56

## 2020-01-01 RX ADMIN — TORSEMIDE 20 MG: 20 TABLET ORAL at 09:28

## 2020-01-01 RX ADMIN — POTASSIUM CHLORIDE 20 MEQ: 1500 TABLET, EXTENDED RELEASE ORAL at 09:48

## 2020-01-01 RX ADMIN — POTASSIUM CHLORIDE 20 MEQ: 1500 TABLET, EXTENDED RELEASE ORAL at 16:53

## 2020-01-01 RX ADMIN — CYANOCOBALAMIN TAB 1000 MCG 1000 MCG: 1000 TAB at 08:14

## 2020-01-01 RX ADMIN — NYSTATIN: 100000 POWDER TOPICAL at 09:16

## 2020-01-01 RX ADMIN — BUMETANIDE 2 MG: 0.25 INJECTION INTRAMUSCULAR; INTRAVENOUS at 15:22

## 2020-01-01 RX ADMIN — BENZONATATE 100 MG: 100 CAPSULE ORAL at 17:41

## 2020-01-01 RX ADMIN — POTASSIUM CHLORIDE 20 MEQ: 1500 TABLET, EXTENDED RELEASE ORAL at 09:30

## 2020-01-01 RX ADMIN — FUROSEMIDE 40 MG: 40 TABLET ORAL at 08:27

## 2020-01-01 RX ADMIN — Medication 10 MG/HR: at 14:57

## 2020-01-01 RX ADMIN — POTASSIUM CHLORIDE 20 MEQ: 1500 TABLET, EXTENDED RELEASE ORAL at 12:18

## 2020-01-01 RX ADMIN — FUROSEMIDE 40 MG: 40 TABLET ORAL at 09:47

## 2020-01-01 RX ADMIN — NYSTATIN: 100000 POWDER TOPICAL at 17:01

## 2020-01-01 RX ADMIN — NYSTATIN: 100000 POWDER TOPICAL at 17:10

## 2020-01-01 RX ADMIN — LORAZEPAM 0.5 MG: 2 INJECTION, SOLUTION INTRAMUSCULAR; INTRAVENOUS at 01:21

## 2020-01-01 RX ADMIN — NYSTATIN: 100000 POWDER TOPICAL at 17:44

## 2020-01-01 RX ADMIN — FUROSEMIDE 40 MG: 40 TABLET ORAL at 05:57

## 2020-01-01 RX ADMIN — NYSTATIN: 100000 POWDER TOPICAL at 10:37

## 2020-01-01 RX ADMIN — APIXABAN 5 MG: 5 TABLET, FILM COATED ORAL at 17:58

## 2020-01-01 RX ADMIN — ASPIRIN 81 MG 81 MG: 81 TABLET ORAL at 08:04

## 2020-01-01 RX ADMIN — METOPROLOL TARTRATE 12.5 MG: 25 TABLET ORAL at 21:08

## 2020-01-01 RX ADMIN — NYSTATIN 1 APPLICATION: 100000 POWDER TOPICAL at 18:19

## 2020-01-01 RX ADMIN — ALPRAZOLAM 0.25 MG: 0.25 TABLET ORAL at 22:35

## 2020-01-01 RX ADMIN — GUAIFENESIN AND DEXTROMETHORPHAN 10 ML: 100; 10 SYRUP ORAL at 20:48

## 2020-01-01 RX ADMIN — POTASSIUM CHLORIDE 20 MEQ: 1500 TABLET, EXTENDED RELEASE ORAL at 12:46

## 2020-01-01 RX ADMIN — APIXABAN 5 MG: 5 TABLET, FILM COATED ORAL at 09:47

## 2020-01-01 RX ADMIN — METOPROLOL TARTRATE 12.5 MG: 25 TABLET ORAL at 21:47

## 2020-01-01 RX ADMIN — CYANOCOBALAMIN TAB 500 MCG 1000 MCG: 500 TAB at 09:47

## 2020-01-01 RX ADMIN — NYSTATIN: 100000 POWDER TOPICAL at 17:29

## 2020-01-01 RX ADMIN — CEPHALEXIN 250 MG: 250 FOR SUSPENSION ORAL at 06:30

## 2020-01-01 RX ADMIN — ENOXAPARIN SODIUM 30 MG: 30 INJECTION SUBCUTANEOUS at 09:14

## 2020-01-01 RX ADMIN — APIXABAN 5 MG: 5 TABLET, FILM COATED ORAL at 08:00

## 2020-01-01 RX ADMIN — FUROSEMIDE 40 MG: 10 INJECTION, SOLUTION INTRAMUSCULAR; INTRAVENOUS at 00:47

## 2020-01-01 RX ADMIN — AMIODARONE HYDROCHLORIDE 400 MG: 200 TABLET ORAL at 09:29

## 2020-01-01 RX ADMIN — METOPROLOL SUCCINATE 25 MG: 25 TABLET, EXTENDED RELEASE ORAL at 08:29

## 2020-01-01 RX ADMIN — ENOXAPARIN SODIUM 30 MG: 30 INJECTION SUBCUTANEOUS at 10:06

## 2020-01-01 RX ADMIN — DOCUSATE SODIUM 100 MG: 100 CAPSULE, LIQUID FILLED ORAL at 08:43

## 2020-01-01 RX ADMIN — POTASSIUM CHLORIDE 20 MEQ: 20 TABLET, EXTENDED RELEASE ORAL at 08:22

## 2020-01-01 RX ADMIN — NYSTATIN: 100000 POWDER TOPICAL at 17:38

## 2020-01-01 RX ADMIN — FOLIC ACID 1 MG: 1 TABLET ORAL at 09:18

## 2020-01-01 RX ADMIN — NYSTATIN 1 APPLICATION: 100000 POWDER TOPICAL at 10:09

## 2020-01-01 RX ADMIN — POTASSIUM CHLORIDE 20 MEQ: 20 TABLET, EXTENDED RELEASE ORAL at 08:07

## 2020-01-01 RX ADMIN — FUROSEMIDE 40 MG: 10 INJECTION, SOLUTION INTRAMUSCULAR; INTRAVENOUS at 01:22

## 2020-01-01 RX ADMIN — METOPROLOL TARTRATE 12.5 MG: 25 TABLET ORAL at 08:42

## 2020-01-01 RX ADMIN — TORSEMIDE 20 MG: 20 TABLET ORAL at 17:42

## 2020-01-01 RX ADMIN — APIXABAN 5 MG: 5 TABLET, FILM COATED ORAL at 18:19

## 2020-01-01 RX ADMIN — NYSTATIN: 100000 POWDER TOPICAL at 17:50

## 2020-01-01 RX ADMIN — FUROSEMIDE 80 MG: 10 INJECTION, SOLUTION INTRAMUSCULAR; INTRAVENOUS at 03:57

## 2020-01-01 RX ADMIN — FUROSEMIDE 40 MG: 40 TABLET ORAL at 10:08

## 2020-01-01 RX ADMIN — FERROUS GLUCONATE 324 MG: 324 TABLET ORAL at 06:21

## 2020-01-01 RX ADMIN — METHYLPREDNISOLONE SODIUM SUCCINATE 125 MG: 125 INJECTION, POWDER, FOR SOLUTION INTRAMUSCULAR; INTRAVENOUS at 01:08

## 2020-01-01 RX ADMIN — APIXABAN 5 MG: 5 TABLET, FILM COATED ORAL at 17:40

## 2020-01-01 RX ADMIN — APIXABAN 5 MG: 5 TABLET, FILM COATED ORAL at 09:29

## 2020-01-01 RX ADMIN — CEPHALEXIN 250 MG: 250 FOR SUSPENSION ORAL at 15:50

## 2020-01-01 RX ADMIN — BUMETANIDE 2 MG: 0.25 INJECTION INTRAMUSCULAR; INTRAVENOUS at 01:49

## 2020-01-01 RX ADMIN — POTASSIUM CHLORIDE 20 MEQ: 1500 TABLET, EXTENDED RELEASE ORAL at 12:30

## 2020-01-01 RX ADMIN — FERROUS GLUCONATE 324 MG: 324 TABLET ORAL at 06:01

## 2020-01-01 RX ADMIN — FUROSEMIDE 40 MG: 40 TABLET ORAL at 10:37

## 2020-01-01 RX ADMIN — FERROUS GLUCONATE 324 MG: 324 TABLET ORAL at 06:03

## 2020-01-01 RX ADMIN — CYANOCOBALAMIN TAB 1000 MCG 1000 MCG: 1000 TAB at 08:42

## 2020-01-01 RX ADMIN — SENNOSIDES AND DOCUSATE SODIUM 1 TABLET: 8.6; 5 TABLET ORAL at 21:22

## 2020-01-01 RX ADMIN — POTASSIUM CHLORIDE 40 MEQ: 1500 TABLET, EXTENDED RELEASE ORAL at 17:58

## 2020-01-01 RX ADMIN — NYSTATIN: 100000 POWDER TOPICAL at 09:36

## 2020-01-01 RX ADMIN — NYSTATIN: 100000 POWDER TOPICAL at 09:27

## 2020-01-01 RX ADMIN — CYANOCOBALAMIN TAB 500 MCG 1000 MCG: 500 TAB at 08:14

## 2020-01-01 RX ADMIN — FUROSEMIDE 40 MG: 10 INJECTION, SOLUTION INTRAMUSCULAR; INTRAVENOUS at 02:22

## 2020-01-01 RX ADMIN — METOPROLOL TARTRATE 25 MG: 25 TABLET ORAL at 08:22

## 2020-01-01 RX ADMIN — POTASSIUM CHLORIDE 20 MEQ: 1500 TABLET, EXTENDED RELEASE ORAL at 16:34

## 2020-01-01 RX ADMIN — CYANOCOBALAMIN TAB 1000 MCG 1000 MCG: 1000 TAB at 10:37

## 2020-01-01 RX ADMIN — APIXABAN 5 MG: 5 TABLET, FILM COATED ORAL at 18:13

## 2020-01-01 RX ADMIN — IPRATROPIUM BROMIDE AND ALBUTEROL SULFATE 3 ML: 2.5; .5 SOLUTION RESPIRATORY (INHALATION) at 01:09

## 2020-01-01 RX ADMIN — NYSTATIN: 100000 POWDER TOPICAL at 08:29

## 2020-01-01 RX ADMIN — NYSTATIN: 100000 POWDER TOPICAL at 10:00

## 2020-01-01 RX ADMIN — POTASSIUM CHLORIDE 20 MEQ: 1500 TABLET, EXTENDED RELEASE ORAL at 12:43

## 2020-01-01 RX ADMIN — CYANOCOBALAMIN TAB 500 MCG 1000 MCG: 500 TAB at 09:29

## 2020-01-01 RX ADMIN — FOLIC ACID 1 MG: 1 TABLET ORAL at 08:14

## 2020-01-01 RX ADMIN — CEFAZOLIN SODIUM 1000 MG: 1 SOLUTION INTRAVENOUS at 12:17

## 2020-01-01 RX ADMIN — LORAZEPAM 0.5 MG: 2 INJECTION INTRAMUSCULAR; INTRAVENOUS at 01:09

## 2020-01-01 RX ADMIN — ENOXAPARIN SODIUM 30 MG: 30 INJECTION SUBCUTANEOUS at 10:29

## 2020-01-01 RX ADMIN — IPRATROPIUM BROMIDE AND ALBUTEROL SULFATE 3 ML: 2.5; .5 SOLUTION RESPIRATORY (INHALATION) at 09:16

## 2020-01-01 RX ADMIN — APIXABAN 5 MG: 5 TABLET, FILM COATED ORAL at 08:29

## 2020-01-01 RX ADMIN — NYSTATIN 1 APPLICATION: 100000 POWDER TOPICAL at 08:10

## 2020-01-01 RX ADMIN — METOPROLOL TARTRATE 12.5 MG: 25 TABLET ORAL at 08:43

## 2020-01-01 RX ADMIN — GUAIFENESIN 200 MG: 100 SOLUTION ORAL at 11:43

## 2020-01-01 RX ADMIN — FUROSEMIDE 40 MG: 40 TABLET ORAL at 09:04

## 2020-01-01 RX ADMIN — ASPIRIN 81 MG 81 MG: 81 TABLET ORAL at 10:37

## 2020-01-01 RX ADMIN — DILTIAZEM HYDROCHLORIDE 120 MG: 120 CAPSULE, COATED, EXTENDED RELEASE ORAL at 11:39

## 2020-01-01 RX ADMIN — POTASSIUM CHLORIDE 20 MEQ: 20 TABLET, EXTENDED RELEASE ORAL at 08:08

## 2020-01-01 RX ADMIN — CEFAZOLIN SODIUM 1000 MG: 1 SOLUTION INTRAVENOUS at 20:02

## 2020-01-01 RX ADMIN — METOPROLOL TARTRATE 12.5 MG: 25 TABLET ORAL at 11:47

## 2020-01-01 RX ADMIN — FOLIC ACID 1 MG: 1 TABLET ORAL at 09:58

## 2020-01-01 RX ADMIN — FERROUS GLUCONATE 324 MG: 324 TABLET ORAL at 06:34

## 2020-01-01 RX ADMIN — APIXABAN 5 MG: 5 TABLET, FILM COATED ORAL at 17:41

## 2020-01-01 RX ADMIN — NYSTATIN: 100000 POWDER TOPICAL at 21:17

## 2020-01-01 RX ADMIN — NYSTATIN: 100000 POWDER TOPICAL at 09:32

## 2020-01-01 RX ADMIN — SENNOSIDES AND DOCUSATE SODIUM 1 TABLET: 8.6; 5 TABLET ORAL at 21:11

## 2020-01-01 RX ADMIN — AMIODARONE HYDROCHLORIDE 400 MG: 200 TABLET ORAL at 16:34

## 2020-01-01 RX ADMIN — AMIODARONE HYDROCHLORIDE 400 MG: 200 TABLET ORAL at 09:47

## 2020-01-01 RX ADMIN — ENOXAPARIN SODIUM 30 MG: 30 INJECTION SUBCUTANEOUS at 08:42

## 2020-01-01 RX ADMIN — FUROSEMIDE 40 MG: 40 TABLET ORAL at 09:35

## 2020-01-01 RX ADMIN — NYSTATIN: 100000 POWDER TOPICAL at 08:36

## 2020-01-01 RX ADMIN — ASPIRIN 81 MG 81 MG: 81 TABLET ORAL at 08:14

## 2020-01-01 RX ADMIN — ASPIRIN 81 MG 81 MG: 81 TABLET ORAL at 09:31

## 2020-01-01 RX ADMIN — NYSTATIN: 100000 POWDER TOPICAL at 08:14

## 2020-01-01 RX ADMIN — METOPROLOL TARTRATE 12.5 MG: 25 TABLET ORAL at 09:32

## 2020-01-01 RX ADMIN — CEFAZOLIN SODIUM 1000 MG: 1 SOLUTION INTRAVENOUS at 05:54

## 2020-01-01 RX ADMIN — HEPARIN SODIUM AND DEXTROSE 18 UNITS/KG/HR: 10000; 5 INJECTION INTRAVENOUS at 05:26

## 2020-01-01 RX ADMIN — POTASSIUM CHLORIDE 20 MEQ: 20 TABLET, EXTENDED RELEASE ORAL at 08:06

## 2020-01-01 RX ADMIN — POTASSIUM CHLORIDE 20 MEQ: 1500 TABLET, EXTENDED RELEASE ORAL at 16:26

## 2020-01-01 RX ADMIN — NYSTATIN: 100000 POWDER TOPICAL at 08:08

## 2020-01-01 RX ADMIN — NYSTATIN: 100000 POWDER TOPICAL at 17:12

## 2020-01-01 RX ADMIN — FOLIC ACID 1 MG: 1 TABLET ORAL at 09:23

## 2020-01-01 RX ADMIN — AMIODARONE HYDROCHLORIDE 400 MG: 200 TABLET ORAL at 21:32

## 2020-01-01 RX ADMIN — BUMETANIDE 2 MG: 0.25 INJECTION INTRAMUSCULAR; INTRAVENOUS at 21:06

## 2020-01-01 RX ADMIN — NYSTATIN 1 APPLICATION: 100000 POWDER TOPICAL at 17:00

## 2020-01-01 RX ADMIN — METOPROLOL TARTRATE 12.5 MG: 25 TABLET ORAL at 09:35

## 2020-01-01 RX ADMIN — CEFAZOLIN SODIUM 1000 MG: 1 SOLUTION INTRAVENOUS at 06:03

## 2020-01-01 RX ADMIN — GUAIFENESIN 600 MG: 600 TABLET ORAL at 21:08

## 2020-01-01 RX ADMIN — METOPROLOL TARTRATE 12.5 MG: 25 TABLET ORAL at 21:22

## 2020-01-01 RX ADMIN — POTASSIUM CHLORIDE 20 MEQ: 1500 TABLET, EXTENDED RELEASE ORAL at 12:56

## 2020-01-01 RX ADMIN — NYSTATIN: 100000 POWDER TOPICAL at 10:23

## 2020-01-01 RX ADMIN — DILTIAZEM HYDROCHLORIDE 120 MG: 120 CAPSULE, COATED, EXTENDED RELEASE ORAL at 12:38

## 2020-01-01 RX ADMIN — TORSEMIDE 20 MG: 20 TABLET ORAL at 08:56

## 2020-01-01 RX ADMIN — AMIODARONE HYDROCHLORIDE 400 MG: 200 TABLET ORAL at 23:26

## 2020-01-01 RX ADMIN — METOPROLOL SUCCINATE 25 MG: 25 TABLET, EXTENDED RELEASE ORAL at 09:28

## 2020-01-01 RX ADMIN — FOLIC ACID 1 MG: 1 TABLET ORAL at 09:08

## 2020-01-01 RX ADMIN — ACETAMINOPHEN 650 MG: 325 TABLET ORAL at 04:22

## 2020-01-01 RX ADMIN — ALBUMIN (HUMAN) 50 G: 0.25 INJECTION, SOLUTION INTRAVENOUS at 06:36

## 2020-01-01 RX ADMIN — TRAMADOL HYDROCHLORIDE 50 MG: 50 TABLET, FILM COATED ORAL at 21:43

## 2020-01-01 RX ADMIN — IPRATROPIUM BROMIDE AND ALBUTEROL SULFATE 3 ML: 2.5; .5 SOLUTION RESPIRATORY (INHALATION) at 21:52

## 2020-01-01 RX ADMIN — FUROSEMIDE 40 MG: 10 INJECTION, SOLUTION INTRAMUSCULAR; INTRAVENOUS at 00:42

## 2020-01-01 RX ADMIN — ASPIRIN 81 MG 81 MG: 81 TABLET ORAL at 09:15

## 2020-01-01 RX ADMIN — FUROSEMIDE 40 MG: 10 INJECTION, SOLUTION INTRAMUSCULAR; INTRAVENOUS at 16:34

## 2020-01-01 RX ADMIN — APIXABAN 5 MG: 5 TABLET, FILM COATED ORAL at 09:17

## 2020-01-01 RX ADMIN — ENOXAPARIN SODIUM 30 MG: 30 INJECTION SUBCUTANEOUS at 10:22

## 2020-01-01 RX ADMIN — APIXABAN 5 MG: 5 TABLET, FILM COATED ORAL at 08:56

## 2020-01-01 RX ADMIN — IRON SUCROSE 200 MG: 20 INJECTION, SOLUTION INTRAVENOUS at 08:22

## 2020-01-01 RX ADMIN — FOLIC ACID 1 MG: 1 TABLET ORAL at 10:07

## 2020-01-01 RX ADMIN — APIXABAN 5 MG: 5 TABLET, FILM COATED ORAL at 11:47

## 2020-01-01 RX ADMIN — METOPROLOL TARTRATE 12.5 MG: 25 TABLET ORAL at 21:21

## 2020-01-01 RX ADMIN — METOPROLOL TARTRATE 12.5 MG: 25 TABLET ORAL at 06:02

## 2020-01-01 RX ADMIN — NYSTATIN: 100000 POWDER TOPICAL at 09:05

## 2020-01-01 RX ADMIN — NYSTATIN: 100000 POWDER TOPICAL at 09:08

## 2020-01-01 RX ADMIN — FUROSEMIDE 40 MG: 10 INJECTION, SOLUTION INTRAVENOUS at 17:12

## 2020-01-01 RX ADMIN — FERROUS GLUCONATE 324 MG: 324 TABLET ORAL at 06:00

## 2020-01-01 RX ADMIN — CEFAZOLIN SODIUM 1000 MG: 1 SOLUTION INTRAVENOUS at 11:46

## 2020-01-01 RX ADMIN — APIXABAN 5 MG: 5 TABLET, FILM COATED ORAL at 09:28

## 2020-01-01 RX ADMIN — AMIODARONE HYDROCHLORIDE 400 MG: 200 TABLET ORAL at 21:17

## 2020-01-01 RX ADMIN — CYANOCOBALAMIN TAB 1000 MCG 1000 MCG: 1000 TAB at 09:04

## 2020-01-01 RX ADMIN — POTASSIUM CHLORIDE 20 MEQ: 1500 TABLET, EXTENDED RELEASE ORAL at 16:55

## 2020-01-01 RX ADMIN — POTASSIUM CHLORIDE 20 MEQ: 1500 TABLET, EXTENDED RELEASE ORAL at 09:28

## 2020-01-01 RX ADMIN — ENOXAPARIN SODIUM 30 MG: 30 INJECTION SUBCUTANEOUS at 09:09

## 2020-01-01 RX ADMIN — NYSTATIN: 100000 POWDER TOPICAL at 09:12

## 2020-01-01 RX ADMIN — BENZONATATE 100 MG: 100 CAPSULE ORAL at 09:17

## 2020-01-01 RX ADMIN — CYANOCOBALAMIN TAB 500 MCG 1000 MCG: 500 TAB at 09:58

## 2020-01-01 RX ADMIN — NYSTATIN: 100000 POWDER TOPICAL at 09:04

## 2020-01-01 RX ADMIN — FERROUS GLUCONATE 324 MG: 324 TABLET ORAL at 06:37

## 2020-01-01 RX ADMIN — ENOXAPARIN SODIUM 30 MG: 30 INJECTION SUBCUTANEOUS at 09:05

## 2020-01-01 RX ADMIN — SODIUM CHLORIDE 250 ML: 0.9 INJECTION, SOLUTION INTRAVENOUS at 04:22

## 2020-01-01 RX ADMIN — MAGNESIUM SULFATE HEPTAHYDRATE 1 G: 1 INJECTION, SOLUTION INTRAVENOUS at 01:16

## 2020-01-01 RX ADMIN — ALBUMIN (HUMAN) 12.5 G: 12.5 SOLUTION INTRAVENOUS at 17:45

## 2020-01-01 RX ADMIN — CYANOCOBALAMIN TAB 1000 MCG 1000 MCG: 1000 TAB at 09:23

## 2020-01-01 RX ADMIN — ASPIRIN 81 MG 81 MG: 81 TABLET ORAL at 08:01

## 2020-01-01 RX ADMIN — NYSTATIN: 100000 POWDER TOPICAL at 17:48

## 2020-01-01 RX ADMIN — METOPROLOL TARTRATE 12.5 MG: 25 TABLET ORAL at 08:00

## 2020-01-01 RX ADMIN — METOPROLOL TARTRATE 12.5 MG: 25 TABLET ORAL at 20:50

## 2020-01-01 RX ADMIN — NYSTATIN 1 APPLICATION: 100000 POWDER TOPICAL at 09:29

## 2020-01-01 RX ADMIN — FOLIC ACID 1 MG: 1 TABLET ORAL at 08:57

## 2020-01-01 RX ADMIN — GUAIFENESIN 200 MG: 100 SOLUTION ORAL at 08:10

## 2020-01-01 RX ADMIN — ASPIRIN 81 MG 81 MG: 81 TABLET ORAL at 08:07

## 2020-01-01 RX ADMIN — NITROGLYCERIN 0.5 INCH: 20 OINTMENT TOPICAL at 02:16

## 2020-01-01 RX ADMIN — POTASSIUM CHLORIDE 20 MEQ: 1500 TABLET, EXTENDED RELEASE ORAL at 08:12

## 2020-01-01 RX ADMIN — AMIODARONE HYDROCHLORIDE 400 MG: 200 TABLET ORAL at 08:55

## 2020-01-01 RX ADMIN — BENZONATATE 100 MG: 100 CAPSULE ORAL at 21:46

## 2020-01-01 RX ADMIN — CYANOCOBALAMIN TAB 500 MCG 1000 MCG: 500 TAB at 08:57

## 2020-01-01 RX ADMIN — DOCUSATE SODIUM 100 MG: 100 CAPSULE, LIQUID FILLED ORAL at 09:23

## 2020-01-01 RX ADMIN — FOLIC ACID 1 MG: 1 TABLET ORAL at 08:41

## 2020-01-01 RX ADMIN — ASPIRIN 81 MG 81 MG: 81 TABLET ORAL at 09:23

## 2020-01-01 RX ADMIN — METOPROLOL SUCCINATE 25 MG: 25 TABLET, EXTENDED RELEASE ORAL at 08:37

## 2020-01-01 RX ADMIN — NYSTATIN: 100000 POWDER TOPICAL at 17:31

## 2020-01-01 RX ADMIN — CEFAZOLIN SODIUM 1000 MG: 1 SOLUTION INTRAVENOUS at 13:38

## 2020-01-01 RX ADMIN — FUROSEMIDE 40 MG: 10 INJECTION, SOLUTION INTRAMUSCULAR; INTRAVENOUS at 17:45

## 2020-01-01 RX ADMIN — BENZONATATE 100 MG: 100 CAPSULE ORAL at 05:32

## 2020-01-01 RX ADMIN — CEFAZOLIN SODIUM 1000 MG: 1 SOLUTION INTRAVENOUS at 23:00

## 2020-01-01 RX ADMIN — FUROSEMIDE 40 MG: 40 TABLET ORAL at 09:09

## 2020-01-01 RX ADMIN — ALBUMIN (HUMAN) 25 G: 0.25 INJECTION, SOLUTION INTRAVENOUS at 08:19

## 2020-01-01 RX ADMIN — ASPIRIN 81 MG 324 MG: 81 TABLET ORAL at 19:48

## 2020-01-01 RX ADMIN — DIGOXIN 125 MCG: 125 TABLET ORAL at 12:37

## 2020-01-01 RX ADMIN — NYSTATIN: 100000 POWDER TOPICAL at 17:52

## 2020-01-01 RX ADMIN — FUROSEMIDE 60 MG: 10 INJECTION, SOLUTION INTRAVENOUS at 22:41

## 2020-01-01 RX ADMIN — ALBUMIN (HUMAN) 25 G: 0.25 INJECTION, SOLUTION INTRAVENOUS at 03:19

## 2020-01-01 RX ADMIN — FOLIC ACID 1 MG: 1 TABLET ORAL at 10:21

## 2020-01-01 RX ADMIN — HEPARIN SODIUM AND DEXTROSE 11.1 UNITS/KG/HR: 10000; 5 INJECTION INTRAVENOUS at 06:17

## 2020-01-01 RX ADMIN — METOPROLOL SUCCINATE 25 MG: 25 TABLET, EXTENDED RELEASE ORAL at 09:48

## 2020-01-01 RX ADMIN — PERMETHRIN: 50 CREAM TOPICAL at 17:41

## 2020-01-01 RX ADMIN — FOLIC ACID 1 MG: 1 TABLET ORAL at 08:07

## 2020-01-01 RX ADMIN — CEFAZOLIN SODIUM 1000 MG: 1 SOLUTION INTRAVENOUS at 13:19

## 2020-01-01 RX ADMIN — FUROSEMIDE 40 MG: 40 TABLET ORAL at 08:43

## 2020-01-01 RX ADMIN — CEFAZOLIN SODIUM 1000 MG: 1 SOLUTION INTRAVENOUS at 05:53

## 2020-01-01 RX ADMIN — POTASSIUM CHLORIDE 20 MEQ: 20 TABLET, EXTENDED RELEASE ORAL at 19:47

## 2020-01-01 RX ADMIN — FERROUS GLUCONATE 324 MG: 324 TABLET ORAL at 06:33

## 2020-01-01 RX ADMIN — NYSTATIN 1 APPLICATION: 100000 POWDER TOPICAL at 08:38

## 2020-01-01 RX ADMIN — CYANOCOBALAMIN TAB 500 MCG 1000 MCG: 500 TAB at 09:28

## 2020-01-01 RX ADMIN — ASPIRIN 81 MG 81 MG: 81 TABLET ORAL at 09:58

## 2020-01-01 RX ADMIN — CEPHALEXIN 250 MG: 250 FOR SUSPENSION ORAL at 05:17

## 2020-01-01 RX ADMIN — ASPIRIN 81 MG 81 MG: 81 TABLET ORAL at 09:47

## 2020-01-01 RX ADMIN — FERROUS GLUCONATE 324 MG: 324 TABLET ORAL at 06:08

## 2020-01-01 RX ADMIN — ENOXAPARIN SODIUM 30 MG: 30 INJECTION SUBCUTANEOUS at 08:28

## 2020-01-01 RX ADMIN — FUROSEMIDE 40 MG: 10 INJECTION, SOLUTION INTRAMUSCULAR; INTRAVENOUS at 08:38

## 2020-01-01 RX ADMIN — ASPIRIN 81 MG 81 MG: 81 TABLET ORAL at 10:06

## 2020-01-01 RX ADMIN — CEPHALEXIN 250 MG: 250 FOR SUSPENSION ORAL at 05:31

## 2020-01-01 RX ADMIN — FOLIC ACID 1 MG: 1 TABLET ORAL at 09:32

## 2020-01-01 RX ADMIN — DIGOXIN 125 MCG: 125 TABLET ORAL at 08:00

## 2020-01-01 RX ADMIN — CYANOCOBALAMIN TAB 500 MCG 1000 MCG: 500 TAB at 08:11

## 2020-01-01 RX ADMIN — FOLIC ACID 1 MG: 1 TABLET ORAL at 08:29

## 2020-01-01 RX ADMIN — FOLIC ACID 1 MG: 1 TABLET ORAL at 08:27

## 2020-01-01 RX ADMIN — METOPROLOL SUCCINATE 12.5 MG: 25 TABLET, EXTENDED RELEASE ORAL at 09:16

## 2020-01-01 RX ADMIN — GUAIFENESIN 200 MG: 100 SOLUTION ORAL at 17:06

## 2020-01-01 RX ADMIN — DOCUSATE SODIUM 100 MG: 100 CAPSULE, LIQUID FILLED ORAL at 09:05

## 2020-01-01 RX ADMIN — CEFAZOLIN SODIUM 1000 MG: 1 SOLUTION INTRAVENOUS at 19:45

## 2020-01-01 RX ADMIN — FUROSEMIDE 40 MG: 10 INJECTION, SOLUTION INTRAMUSCULAR; INTRAVENOUS at 17:42

## 2020-01-01 RX ADMIN — CEPHALEXIN 250 MG: 250 FOR SUSPENSION ORAL at 21:44

## 2020-01-01 RX ADMIN — APIXABAN 5 MG: 5 TABLET, FILM COATED ORAL at 17:22

## 2020-01-01 RX ADMIN — POTASSIUM CHLORIDE 20 MEQ: 1500 TABLET, EXTENDED RELEASE ORAL at 12:45

## 2020-01-01 RX ADMIN — FERROUS GLUCONATE 324 MG: 324 TABLET ORAL at 10:13

## 2020-01-01 RX ADMIN — METOPROLOL TARTRATE 12.5 MG: 25 TABLET ORAL at 22:12

## 2020-01-01 RX ADMIN — FERROUS GLUCONATE 324 MG: 324 TABLET ORAL at 06:15

## 2020-01-01 RX ADMIN — IPRATROPIUM BROMIDE AND ALBUTEROL SULFATE 3 ML: 2.5; .5 SOLUTION RESPIRATORY (INHALATION) at 01:20

## 2020-01-01 RX ADMIN — NYSTATIN 1 APPLICATION: 100000 POWDER TOPICAL at 18:13

## 2020-01-01 RX ADMIN — CYANOCOBALAMIN TAB 1000 MCG 1000 MCG: 1000 TAB at 09:15

## 2020-01-01 RX ADMIN — CEFAZOLIN SODIUM 1000 MG: 1 SOLUTION INTRAVENOUS at 05:20

## 2020-01-01 RX ADMIN — NYSTATIN: 100000 POWDER TOPICAL at 17:46

## 2020-01-01 RX ADMIN — IRON SUCROSE 200 MG: 20 INJECTION, SOLUTION INTRAVENOUS at 16:43

## 2020-01-01 RX ADMIN — Medication 10 MG/HR: at 12:02

## 2020-01-01 RX ADMIN — BENZONATATE 100 MG: 100 CAPSULE ORAL at 18:03

## 2020-01-01 RX ADMIN — ALBUMIN (HUMAN) 25 G: 0.25 INJECTION, SOLUTION INTRAVENOUS at 21:09

## 2020-01-01 RX ADMIN — FOLIC ACID 1 MG: 1 TABLET ORAL at 08:12

## 2020-01-01 RX ADMIN — FUROSEMIDE 40 MG: 10 INJECTION, SOLUTION INTRAMUSCULAR; INTRAVENOUS at 09:23

## 2020-01-01 RX ADMIN — FOLIC ACID 1 MG: 1 TABLET ORAL at 08:43

## 2020-01-01 RX ADMIN — FUROSEMIDE 40 MG: 10 INJECTION, SOLUTION INTRAMUSCULAR; INTRAVENOUS at 17:57

## 2020-01-01 RX ADMIN — POTASSIUM CHLORIDE 20 MEQ: 1500 TABLET, EXTENDED RELEASE ORAL at 15:58

## 2020-01-01 RX ADMIN — AMIODARONE HYDROCHLORIDE 0.5 MG/MIN: 50 INJECTION, SOLUTION INTRAVENOUS at 06:21

## 2020-01-01 RX ADMIN — ALBUMIN (HUMAN) 25 G: 0.25 INJECTION, SOLUTION INTRAVENOUS at 13:22

## 2020-01-01 RX ADMIN — DILTIAZEM HYDROCHLORIDE 120 MG: 120 CAPSULE, COATED, EXTENDED RELEASE ORAL at 11:47

## 2020-01-01 RX ADMIN — CYANOCOBALAMIN TAB 1000 MCG 1000 MCG: 1000 TAB at 08:27

## 2020-01-01 RX ADMIN — GUAIFENESIN 600 MG: 600 TABLET ORAL at 09:23

## 2020-01-01 RX ADMIN — APIXABAN 5 MG: 5 TABLET, FILM COATED ORAL at 17:42

## 2020-01-01 RX ADMIN — FUROSEMIDE 40 MG: 40 TABLET ORAL at 08:05

## 2020-03-10 PROBLEM — D64.9 ACUTE ANEMIA: Status: ACTIVE | Noted: 2020-01-01

## 2020-03-10 PROBLEM — R77.8 TROPONIN I ABOVE REFERENCE RANGE: Status: ACTIVE | Noted: 2020-01-01

## 2020-03-10 PROBLEM — I50.9 ACUTE HEART FAILURE (HCC): Status: ACTIVE | Noted: 2020-01-01

## 2020-03-10 PROBLEM — J96.01 ACUTE RESPIRATORY FAILURE WITH HYPOXIA (HCC): Status: ACTIVE | Noted: 2020-01-01

## 2020-03-10 PROBLEM — D69.6 THROMBOCYTOPENIA (HCC): Status: ACTIVE | Noted: 2020-01-01

## 2020-03-10 NOTE — H&P
H&P- Richi Hamlin 1945, 75 y o  female MRN: [id number]    Unit/Bed#: 7T [facility] 703-02 Encounter: [id number]    Primary Care Provider: No primary care provider on file  Date and time admitted to hospital: 3/10/2020  3:41 PM        Thrombocytopenia ([address] )  Assessment & Plan  · Has not seen a physician in 20 years  Monitor while on Lovenox it is 103,000  Will check a vitamin E00 folic acid , will re-evaluate if need for any Hematology evaluation or any further bone marrow biopsies not on any outpatient medications  No sepsis    Acute anemia  Assessment & Plan  · Patient has not seen a physician in 20 years  Her hemoglobin is 9  Will check anemia profile vitamin I87 folic acid  Will check Hemoccult  Monitor while on aspirin and DVT prophylaxis there is no acute bleed reported in GI  Troponin I above reference range  Assessment & Plan  · She has no acute chest pain but she does have acute CHF  She will be loaded with 324 of aspirin  Will be monitored and trended with troponin  2D echo will be obtained this could most likely be secondary to demand ischemia secondary to heart failure  EKG has no acute ST or T-wave changes  EKG to be obtained in the morning  Lipid panel  Acute heart failure (HCC)  Assessment & Plan  Wt Readings from Last 3 Encounters:   03/10/20 120 kg (265 lb 3 2 oz)   · Exacerbation she has +3 pitting edema up to the knees at this point  She has pleural effusions there is lot of cephalization  She has a proBNP of greater than 6000  She has not seen a doctor in 20 years she is not on any medications  She will be given Lasix 40 mg IV x1 and continue every 8 hours  Strict I&O daily await cardiology evaluation troponin elevation could be secondary to demand ischemia will assess via 2D echo in trend more  Place her on aspirin 324 mg will be given now and then 81 mg daily  She will need a beta blocker when she is more euvolemic    And then Arb/ace based on her renal stability and also  EF  Cardiology consultation  Will be placed on cardiac diet with 1500 fluid restriction  Based on the EF will determine for a stress test versus catheterization  Will trend troponins are stated lipid profile in the morning TSH to be obtained as well  Will obtain a venous duplex as well          * Acute respiratory failure with hypoxia ([address] )  Assessment & Plan  · Secondary to new onset heart failure exacerbation she does have pleural effusions  She does not have any fever or white count  It do not suspect this is secondary to pneumonia with the chest x-ray listing  She has a white productive cough sputum  And her proBNP is over 6000  Continue oxygen with keeping sats of greater than 92% diuresis trending troponins 2D echo  I will get a CT chest without contrast to visualize more  Check procalcitonin tomorrow  VTE Prophylaxis: Enoxaparin (Lovenox)  / reason for no mechanical VTE prophylaxis Assessing if she has a DVT   Code Status:  DNR DNI  POLST: There is no POLST form on file for this patient (pre-hospital)  Discussion with family:  Daughter at bedside    Anticipated Length of Stay:  Patient will be admitted on an Inpatient basis with an anticipated length of stay of  > 2 midnights  Justification for Hospital Stay:  New onset CHF    Total Time for Visit, including Counseling / Coordination of Care: 1 hour  Greater than 50% of this total time spent on direct patient counseling and coordination of care  Chief Complaint:   Shortness of breath and cough    History of Present Illness:    Ritchie Novak is a 75 y o  female who presents with shortness of breath and cough for approximately 1 week productive of clear mucus    Patient is unable to sleep flat secondary to shortness of breath she sleeps on 2 pillows she denies any paroxysmal nocturnal dyspnea she has been short of breath a 10-15 years she denies any current smoking but has been getting worse for the past week she noticed some weight gain unknown amount she has not seen a doctor in 20 years she noticed her leg edema has worsened for the past week  Intermittent chest pressure currently none  No dizziness no abdominal distension       Review of Systems:    Review of Systems   Constitutional: Negative for fatigue and fever  HENT: Negative  Negative for ear pain, rhinorrhea and sore throat  Eyes: Negative  Negative for pain and itching  Respiratory: Positive for cough and shortness of breath  Negative for chest tightness and wheezing  Cardiovascular: Positive for leg swelling  Negative for chest pain and palpitations  Gastrointestinal: Negative  Negative for abdominal pain, diarrhea, nausea and vomiting  Endocrine: Negative for polydipsia, polyphagia and polyuria  Genitourinary: Negative for dysuria and flank pain  Musculoskeletal: Negative  Negative for back pain and myalgias  Skin: Negative  Negative for rash and wound  Neurological: Negative  Negative for dizziness, syncope, speech difficulty, weakness, light-headedness, numbness and headaches  Psychiatric/Behavioral: Negative for agitation, confusion and decreased concentration  All other systems reviewed and are negative  Past Medical and Surgical History:     History reviewed  No pertinent past medical history  History reviewed  No pertinent surgical history  Meds/Allergies:    Prior to Admission medications    Not on File     I have reviewed home medications with patient personally  Allergies: Allergies   Allergen Reactions    Sulfa Antibiotics        Social History:     Marital Status:      Substance Use History:   Social History     Substance and Sexual Activity   Alcohol Use Not Currently     Social History     Tobacco Use   Smoking Status Former Smoker   Smokeless Tobacco Never Used     Social History     Substance and Sexual Activity   Drug Use Never       Family History:    History reviewed   No pertinent family history  Physical Exam:     Vitals:   Blood Pressure: 158/80 (03/10/20 1745)  Pulse: (!) 114 (03/10/20 1745)  Temperature: 98 °F (36 7 °C) (03/10/20 1745)  Temp Source: Temporal (03/10/20 1745)  Respirations: (!) 36 (03/10/20 1745)  Weight - Scale: 120 kg (265 lb 3 2 oz) (03/10/20 1548)  SpO2: 93 % (03/10/20 1745)    Physical Exam   Constitutional: She is oriented to person, place, and time  She appears well-developed and well-nourished  HENT:   Head: Normocephalic and atraumatic  Eyes: Pupils are equal, round, and reactive to light  EOM are normal    Neck: Normal range of motion  Cardiovascular: Normal heart sounds  Tachycardia present  Pulmonary/Chest: Effort normal  She has rales [facility])  Abdominal: Soft  Bowel sounds are normal  She exhibits no distension  There is no tenderness  Musculoskeletal: Normal range of motion  She exhibits edema (Plus three pitting edema up to the knees)  Neurological: She is alert and oriented to person, place, and time  She has normal reflexes  cranial nerve 2-12 are normal   Normal neurological exam   Skin: Skin is warm  Psychiatric: She has a normal mood and affect             Additional Data:     Lab Results: I have personally reviewed pertinent films in PACS    Results from last 7 days   Lab Units 03/10/20  1555   WBC Thousand/uL 7 60   HEMOGLOBIN g/dL 9 0*   HEMATOCRIT % 28 7*   PLATELETS Thousands/uL 103*   NEUTROS PCT % 66*   LYMPHS PCT % 25   MONOS PCT % 8   EOS PCT % 1     Results from last 7 days   Lab Units 03/10/20  1555   SODIUM mmol/L 140   POTASSIUM mmol/L 3 4*   CHLORIDE mmol/L 102   CO2 mmol/L 30   BUN mg/dL 13   CREATININE mg/dL 1 08   ANION GAP mmol/L 8   CALCIUM mg/dL 9 1   ALBUMIN g/dL 3 2   TOTAL BILIRUBIN mg/dL 1 40*   ALK PHOS U/L 127*   ALT U/L 30   AST U/L 35   GLUCOSE RANDOM mg/dL 114*                       Imaging: I have personally reviewed pertinent films in PACS    XR chest 1 view portable   ED Interpretation by Usman Angeles, DO (03/10 1650)   B/l plueral effusion      Final Result by Adonis Rico MD (03/10 1704)      Pleural-parenchymal density at the bases as well as perihilar infiltrate  Pneumonia is possible  Follow-up PA and lateral film is advised better assess the hilar region  The study was marked in [facility] for immediate notification  Workstation performed: [id number]         CT chest wo contrast    (Results Pending)   VAS lower limb venous duplex study, complete bilateral    (Results Pending)       EKG, Pathology, and Other Studies Reviewed on Admission:   · EKG:  Reviewed    Allscripts / Epic Records Reviewed: Yes     ** Please Note: This note has been constructed using a voice recognition system   **

## 2020-03-10 NOTE — ASSESSMENT & PLAN NOTE
· Has not seen a physician in 20 years  Monitor while on Lovenox it is 103,000  Will check a vitamin R16 folic acid , will re-evaluate if need for any Hematology evaluation or any further bone marrow biopsies not on any outpatient medications    No sepsis

## 2020-03-10 NOTE — ASSESSMENT & PLAN NOTE
· Patient has not seen a physician in 20 years  Her hemoglobin is 9  Will check anemia profile vitamin C97 folic acid  Will check Hemoccult  Monitor while on aspirin and DVT prophylaxis there is no acute bleed reported in GI

## 2020-03-10 NOTE — ED PROVIDER NOTES
History  Chief Complaint   Patient presents with    Shortness of Breath     pt unable to catch her breath after a coughing spell and panic attack    Cough     productive cough x 1 week or more     Patient is a 41-year-old female who states that she only has a history of hypertension for which she does not take any medication to control  States that she called EMS today as she was having trouble breathing  Notes that she has been having a cough productive of clear and white sputum for approximately 1 week  Symptoms have been getting worse daily  She also notes that her lower extremities feel that they are more full  Denies any fevers chills nausea vomiting diarrhea, sick contacts or recent travel  She states she has not seen a doctor in 20 years because,  " I would not take the medications they told me to take any way "      Denies history of smoking or home oxygen use  Per EMS, patient was initially seen by local fire fighters to put a pulse ox on her and it was between 85 and 88%  Patient arrives at 100% oxygenation on nasal cannula  History provided by:  Patient and EMS personnel   used: No    Shortness of Breath   Associated symptoms: cough    Associated symptoms: no abdominal pain, no chest pain, no fever, no neck pain, no rash, no sore throat, no vomiting and no wheezing    Cough   Associated symptoms: shortness of breath    Associated symptoms: no chest pain, no chills, no fever, no rash, no rhinorrhea, no sore throat and no wheezing        None       History reviewed  No pertinent past medical history  History reviewed  No pertinent surgical history  History reviewed  No pertinent family history  I have reviewed and agree with the history as documented      E-Cigarette/Vaping     E-Cigarette/Vaping Substances     Social History     Tobacco Use    Smoking status: Former Smoker    Smokeless tobacco: Never Used   Substance Use Topics    Alcohol use: Not Currently   [place] Drug use: Never       Review of Systems   Constitutional: Negative  Negative for chills and fever  HENT: Negative  Negative for rhinorrhea, sore throat, trouble swallowing and voice change  Eyes: Negative  Negative for pain and visual disturbance  Respiratory: Positive for cough and shortness of breath  Negative for wheezing  Cardiovascular: Positive for leg swelling  Negative for chest pain and palpitations  Gastrointestinal: Negative for abdominal pain, diarrhea, nausea and vomiting  Genitourinary: Negative  Negative for dysuria and frequency  Musculoskeletal: Negative  Negative for neck pain and neck stiffness  Skin: Negative  Negative for rash  Neurological: Negative  Negative for dizziness, speech difficulty, weakness, light-headedness and numbness  Physical Exam  Physical Exam   Constitutional: She is oriented to person, place, and time  She appears well-developed and well-nourished  No distress  HENT:   Head: Normocephalic and atraumatic  Mouth/Throat: Oropharynx is clear and moist    Eyes: Pupils are equal, round, and reactive to light  Conjunctivae and EOM are normal    Neck: Normal range of motion  Neck supple  No tracheal deviation present  Cardiovascular: Regular rhythm and intact distal pulses  Tachycardia present  Pulmonary/Chest: Accessory muscle usage present  Tachypnea noted  No respiratory distress  She has decreased breath sounds in the right lower field and the left lower field  She has no wheezes  She has rales in the right lower field and the left lower field  Abdominal: Soft  Bowel sounds are normal  She exhibits no distension  There is no tenderness  There is no rebound and no guarding  Musculoskeletal: Normal range of motion  She exhibits no tenderness or deformity  Neurological: She is alert and oriented to person, place, and time  Skin: Skin is warm and dry  Capillary refill takes less than 2 seconds  No rash noted     Psychiatric: She has a normal mood and affect  Her behavior is normal    Nursing note and vitals reviewed        Vital Signs  ED Triage Vitals [03/10/20 1548]   Temperature Pulse Respirations Blood Pressure SpO2   (!) 97 1 °F (36 2 °C) (!) 123 (!) 24 168/87 100 %      Temp Source Heart Rate Source Patient Position - Orthostatic VS BP Location FiO2 (%)   Tympanic Monitor Sitting Left arm --      Pain Score       --           Vitals:    03/10/20 1548   BP: 168/87   Pulse: (!) 123   Patient Position - Orthostatic VS: Sitting         Visual Acuity      ED Medications  Medications - No data to display    Diagnostic Studies  Results Reviewed     Procedure Component Value Units Date/Time    Influenza A/B and RSV PCR [[id number]] Collected:  03/10/20 1600    Lab Status:  No result Specimen:  Nares from Nose     CBC and differential [[id number]] Collected:  03/10/20 1555    Lab Status:  No result Specimen:  Blood from Arm, Right     Comprehensive metabolic panel [[id number]] Collected:  03/10/20 1555    Lab Status:  No result Specimen:  Blood from Arm, Right     Magnesium [[id number]] Collected:  03/10/20 1555    Lab Status:  No result Specimen:  Blood from Arm, Right     Troponin I [[id number]] Collected:  03/10/20 1555    Lab Status:  No result Specimen:  Blood from Arm, Right     NT-BNP PRO [[id number]] Collected:  03/10/20 1555    Lab Status:  No result Specimen:  Blood from Arm, Right     Blood culture #1 [[id number]] Collected:  03/10/20 1555    Lab Status:  No result Specimen:  Blood from Arm, Right     Blood culture #2 [[id number]] Collected:  03/10/20 1555    Lab Status:  No result Specimen:  Blood from Hand, Left                  XR chest 1 view portable    (Results Pending)              Procedures  Procedures         ED Course  ED Course as of Mar 10 1719   Tue Mar 10, 2020   1615 Procedure Note: EKG  Date/Time: 03/10/20 4:18 PM   Performed by: Sandrine Perla  Authorized by: Sandrine Perla  ECG interpreted by me, the ED Provider: yes   The EKG demonstrates:  Rate 107  Rhythm sinus tach  QTc 411  No ST elevations/depressions                                      MDM  Number of Diagnoses or Management Options  CHF (congestive heart failure) ([facility] [address] ):   Elevated troponin:   Hypoxia:   Pleural effusion:   Diagnosis management comments: Patient is a 26-year-old female who presents for concerns shortness of breath  She has has symmetrical bilateral lower extremity swelling, rales on examination  Likely undiagnosed CHF type exacerbation  Will screening cardiac labs, x-ray EKG  Will also test troponin  Patient states that she did not have any chest pain associated with her symptoms  Will also screen for influenza  Patient's testing shows that she has significant bilateral pleural effusions  She was started on IV Lasix in the emergency room  Shows a mildly elevated troponin but likely secondary to congestive heart failure exacerbation that is benign diagnosis she does not see a physician  Her other blood work was grossly normal   This is likely exception of a mildly elevated troponin at 0 31  Patient's EKG was without a STEMI, she did not have any chest pain through her entire week-long episode of discomfort  Will hold on heparin at this point  Patient is to be admitted to [facility]  Amount and/or Complexity of Data Reviewed  Clinical lab tests: ordered and reviewed  Tests in the radiology section of CPT®: ordered and reviewed  Tests in the medicine section of CPT®: ordered and reviewed  Independent visualization of images, tracings, or specimens: yes          Disposition  Final diagnoses:   None     ED Disposition     None      Follow-up Information    None         Patient's Medications    No medications on file     No discharge procedures on file      PDMP Review     None          ED Provider  Electronically Signed by           Solitario Valladares DO  03/10/20 7581 No

## 2020-03-10 NOTE — ASSESSMENT & PLAN NOTE
Wt Readings from Last 3 Encounters:   03/10/20 120 kg (265 lb 3 2 oz)   · Exacerbation she has +3 pitting edema up to the knees at this point  She has pleural effusions there is lot of cephalization  She has a proBNP of greater than 6000  She has not seen a doctor in 20 years she is not on any medications  She will be given Lasix 40 mg IV x1 and continue every 8 hours  Strict I&O daily await cardiology evaluation troponin elevation could be secondary to demand ischemia will assess via 2D echo in trend more  Place her on aspirin 324 mg will be given now and then 81 mg daily  She will need a beta blocker when she is more euvolemic  And then Arb/ace based on her renal stability and also  EF  Cardiology consultation  Will be placed on cardiac diet with 1500 fluid restriction  Based on the EF will determine for a stress test versus catheterization  Will trend troponins are stated lipid profile in the morning TSH to be obtained as well    Will obtain a venous duplex as well

## 2020-03-11 PROBLEM — E53.8 VITAMIN B12 DEFICIENCY: Status: ACTIVE | Noted: 2020-01-01

## 2020-03-11 NOTE — PLAN OF CARE
Problem: PHYSICAL THERAPY ADULT  Goal: Performs mobility at highest level of function for planned discharge setting  See evaluation for individualized goals  Description  Treatment/Interventions: Functional transfer training, LE strengthening/ROM, Elevations, Therapeutic exercise, Endurance training, Patient/family training, Equipment eval/education, Bed mobility, Gait training, Spoke to nursing, Spoke to case management  Equipment Recommended: Brian Rodríguez       See flowsheet documentation for full assessment, interventions and recommendations  Note:   Prognosis: Fair  Problem List: Decreased endurance, Impaired balance, Decreased mobility, Obesity  Assessment: Emily Sumner is a 75 y o  female admitted to [facility] on 3/10/2020 for Acute respiratory failure with hypoxia ([facility] [address] )  Pt  has no past medical history on file    PT was consulted and pt was seen on 3/11/2020 for mobility assessment and d/c planning  Pt presents supine in bed with HOB elevated on 3L NC O2  She is not reporting any pain at this time  She has functional strength in the UE and LEs  Pt is currently functioning at a supervision assistance x1 level for bed mobility, supervision assistance x1 level for transfers, minimum assistance x1 level for ambulation with [address], she was unable to attempt elevation training today  Pt was saturating above 95% on 3L NC O2, when seated EOB on RA she desaturated to 91% and with short ambulation trial she developed significant SOB and KNOX desaturating to 81%, recovered to 95% with seated rest and 3L O2  She is primarily limited by cardiovascular endurance at this time  Pt will benefit from continued skilled IP PT to address the above mentioned impairments  in order to maximize recovery and increase functional independence when completing mobility and ADLs  Currently PT recommendations for DME include SPC for ambulation and transfers and a tub bench for tub transfers   At this time PT recommendations for d/c are inpt rehab vs home pending improvement in pts functional status  Pts dtr reporting that she is likely to be resistive to rehab recommendation  Barriers to Discharge: Inaccessible home environment     Recommendation: (home vs rehab pending progress)     PT - OK to Discharge: No(pt will need stair training prior to D/C; may need rehab)    See flowsheet documentation for full assessment

## 2020-03-11 NOTE — ASSESSMENT & PLAN NOTE
· Secondary to new onset heart failure exacerbation she does have pleural effusions  She does not have any fever or white count  It do not suspect this is secondary to pneumonia with the chest x-ray listing  She has a white productive cough sputum  And her proBNP is over 6000  Continue oxygen with keeping sats of greater than 92% diuresis trending troponins 2D echo  I will get a CT chest without contrast to visualize more- still pending  Check procalcitonin still pending  No fevers  Cough improved, weight is down but patient is still fluid overloaded her renal is stable from last night will increase Lasix 60 mg IV t i d

## 2020-03-11 NOTE — ASSESSMENT & PLAN NOTE
· She has no acute chest pain but she does have acute CHF  Continue aspirin 81mg po daily -> will hold plavix and ac as low hb and thrombocytopenia of unknown etiology   Will be monitored and trended with troponin going up trending   2D echo will be obtained this could most likely be secondary to demand ischemia secondary to heart failure  EKG has no acute ST or T-wave changes  Lipid panel- acceptable

## 2020-03-11 NOTE — ASSESSMENT & PLAN NOTE
· Has not seen a physician in 20 years    plt decrease more to 84,000 dc dvt prophylaxis- folate level pending, no sepsis , suppl vitamin b12- aksed hem onc to see may need bone marrow biopsy

## 2020-03-11 NOTE — ASSESSMENT & PLAN NOTE
· Patient has not seen a physician in 20 years  Her hemoglobin is 9  Will check anemia profile vitamin B12 - low for her age <400 start oral suppl /folic acid  Will check Hemoccult- patient reports no melena or hematochezia     Monitor while on aspirin as plt dropped below 100,000 will hold lovenox

## 2020-03-11 NOTE — CONSULTS
Consulted for CHF diet education  PT provided diet hx, lives with daughter who does some of the cooking  PT usually skips breakfast, snacks on chips, for lunch eats [place] bologna sandwich and canned soups, for dinner didn't specify, stated "whatever we feel like having"  Provided written and verbal diet education on Heart Failure Nutrition Therapy  Reviewed high Na foods to avoid for various food groups, encouraged reading food labels, reviewed ways to flavor food vs salt, encouraged monitoring weight at home and fluid intake  PT and daughter were receptive and appreciative of information

## 2020-03-11 NOTE — CONSULTS
Medical Oncology/Hematology Consult Note  Phuong Rodriguez, female, 75 y  o , 1945,  7T /7T -02, [id number]     Assessment and Plan  1  Normocytic anemia:  Patient has normocytic anemia hemoglobin 8 4  She has no prior laboratory studies for comparison  Her anemia most likely is multifactorial including nutritional deficiencies, anemia of chronic disease/inflammation and possibly some mild renal dysfunction  Other etiologies cannot be ruled out  We will pursue extensive anemia workup to be done  She was already started on oral B12 supplements  We will also start her on folic acid supplements daily and administer IV Venofer 200 mg every other day x2 which will hopefully improve her anemia  Continue to monitor CBC  2  Thrombocytopenia:  Platelet count this morning 84,000  She denies any bleeding from any site  Suspicion of thrombocytopenia due to liver disease as her recent imaging showed possible cirrhosis; nutritional deficiencies may or may not also be contributory  Awaiting ultrasound which is pending  As above we will order additional workup to rule out other etiologies of her thrombocytopenia which is likely a chronic process  Continue to monitor/trend CBC daily  We will continue to monitor patient's laboratory studies closely remotely and follow-up with her in the hospital if needed  Our office staff will arrange for outpatient follow-up after she is discharged from the hospital to review her initial workup and outpatient monitoring  Patient was provided with our contact information and encouraged to call with any further questions or concerns in the interim  Please feel free to contact us with any further questions or concerns  Reason for consultation:  Anemia and thrombocytopenia    History of present illness:   Patient is a pleasant 39-year-old female with no past medical history up until admission; she has not seen a physician or had medical care for 20 years  Reports that she was a former light smoker for approximately 5 years and quit 25 years ago  She presented to the emergency room on 03/10/2020 with 1 week history of worsening dyspnea and cough after she developed a cold according to her  Patient was found to be in CHF so she was admitted for further evaluation/treatment  She was also found to have thrombocytopenia and anemia so we were asked to see patient  Patient's most recent laboratory studies from this morning showed normal white cells 5 10, normocytic anemia H&H 8 4/26 9, MCV 89 and thrombocytopenia platelet count 11255  She did not have a differential done today but her differential from yesterday was essentially normal without any hint of immature cells  CMP from yesterday showed glucose 106, elevated AST 40, elevated alk-phos 124, bilirubin slightly higher than average 1 5, GFR is decreased 52 with creatinine 1 05; remaining metabolic panel seems to be normal   Her stool for occult blood is still pending at this time  Folic acid is in the low-normal range 6 5  B12 is also lower than average 329 she was already started on oral daily vitamin B12 supplementation by the medical team   Patient is iron deficient with iron saturation 7%, TIBC 349, serum iron low 24 and low ferritin 14  She has no prior blood work for comparison  She had a CT scan of the chest without contrast done yesterday which showed:  IMPRESSION:  Small right upper lobe groundglass alveolar infiltrate  Multilobar subsegmental atelectasis, left greater than right  Small to moderate layering bilateral pleural effusions, left greater than right  Cardiomegaly  No pericardial effusion  Equivocal nodular liver surface  Consider hepatic cirrhosis in the appropriate clinical context  Mild geraldine hepatic adenopathy may be sequela of chronic hepatocellular disease  Consider follow-up contrast enhanced CT of the abdomen and pelvis  Cholelithiasis      Patient states that she has never had a colonoscopy and is adamant that she does not want one  She denies any constitutional symptoms, GI symptoms or bleeding from any site  Reports poor nutrition she does not eat a lot of fruits and vegetables and admits to eating a lot of junk food and drinking a lot of Coca Cola  She reports fatigue, loose nonproductive cough and dyspnea with exertion  Review of Systems:   Review of Systems   Constitutional: Positive for activity change and fatigue  Negative for appetite change, fever and unexpected weight change  HENT: Negative for nosebleeds  Eyes: Negative for visual disturbance  Respiratory: Positive for cough, shortness of breath (Exertional) and wheezing  Cardiovascular: Positive for leg swelling  Negative for chest pain  Gastrointestinal: Negative for abdominal pain, anal bleeding, blood in stool, constipation, diarrhea, nausea and vomiting  Genitourinary: Negative for difficulty urinating, dysuria and hematuria  Musculoskeletal: Negative for arthralgias and back pain  Skin: Negative for rash  Neurological: Negative for dizziness and headaches  Hematological: Negative for adenopathy  Psychiatric/Behavioral: Positive for sleep disturbance  Negative for dysphoric mood  History reviewed  No pertinent past medical history  Past Surgical History:   Procedure Laterality Date    APPENDECTOMY      HYSTERECTOMY      TONSILLECTOMY         History reviewed  No pertinent family history  Social History     Socioeconomic History    Marital status:       Spouse name: None    Number of children: None    Years of education: None    Highest education level: None   Occupational History    None   Social Needs    Financial resource strain: None    Food insecurity:     Worry: None     Inability: None    Transportation needs:     Medical: None     Non-medical: None   Tobacco Use    Smoking status: Former Smoker    Smokeless tobacco: Never Used   Substance and Sexual Activity  Alcohol use: Not Currently     Alcohol/week: 1 0 standard drinks     Types: 1 Shots of liquor per week     Frequency: Monthly or less     Drinks per session: 1 or 2     Binge frequency: Never    Drug use: Never    Sexual activity: Not Currently     Partners: Male   Lifestyle    Physical activity:     Days per week: None     Minutes per session: None    Stress: None   Relationships    Social connections:     Talks on phone: None     Gets together: None     Attends [employer] service: None     Active member of club or organization: None     Attends meetings of clubs or organizations: None     Relationship status: None    Intimate partner violence:     Fear of current or ex partner: None     Emotionally abused: None     Physically abused: None     Forced sexual activity: None   Other Topics Concern    None   Social History Narrative    None         Current Facility-Administered Medications:     aspirin chewable tablet 81 mg, 81 mg, Oral, Daily, Twin Bryson MD, 81 mg at 03/11/20 0806    benzonatate (TESSALON PERLES) capsule 100 mg, 100 mg, Oral, TID PRN, Twin Bryson MD    cyanocobalamin (VITAMIN B-12) tablet 1,000 mcg, 1,000 mcg, Oral, Daily, Twin Bryson MD, 1,000 mcg at 81/23/73 8740    folic acid (FOLVITE) tablet 1 mg, 1 mg, Oral, Daily, EUN Solis    furosemide (LASIX) injection 60 mg, 60 mg, Intravenous, Q8H, Twin Bryson MD    ipratropium-albuterol (DUO-NEB) 0 5-2 5 mg/3 mL inhalation solution 3 mL, 3 mL, Nebulization, Q6H PRN, Kevin Sicard, CRNP, 3 mL at 03/11/20 0824    iron sucrose (VENOFER) 200 mg in sodium chloride 0 9 % 100 mL IVPB, 200 mg, Intravenous, Every Other Day, EUN Prasad    nitroglycerin (NITROSTAT) SL tablet 0 4 mg, 0 4 mg, Sublingual, Q5 Min PRN, Twin Bryson MD    potassium chloride (K-DUR,KLOR-CON) CR tablet 20 mEq, 20 mEq, Oral, Daily, Twin Bryson MD, 20 mEq at 03/11/20 0806    No medications prior to admission  Allergies   Allergen Reactions    Sulfa Antibiotics Hallucinations         Physical Exam:    /64 (BP Location: Left arm)   Pulse 82   Temp (!) 96 5 °F (35 8 °C) (Temporal)   Resp 20   Ht 5' 1 5" (1 562 m)   Wt 114 kg (251 lb 5 2 oz)   SpO2 97%   Breastfeeding No   BMI 46 72 kg/m²     Physical Exam   Constitutional: She is oriented to person, place, and time  She appears well-developed and well-nourished  No distress  HENT:   Head: Normocephalic and atraumatic  Mouth/Throat: Oropharynx is clear and moist  No oropharyngeal exudate  Eyes: Pupils are equal, round, and reactive to light  Conjunctivae are normal  No scleral icterus  Neck: Normal range of motion  Neck supple  No thyromegaly present  Cardiovascular: Normal rate, regular rhythm, normal heart sounds and intact distal pulses  No murmur heard  Pulmonary/Chest: Effort normal  No respiratory distress  She has decreased breath sounds  She has wheezes (Inspiratory and expiratory wheezes throughout)  Wearing O2 3 L nasal cannula   Abdominal: Soft  Bowel sounds are normal  She exhibits no distension  There is no hepatosplenomegaly  There is no tenderness  Morbidly obese   Musculoskeletal: Normal range of motion  She exhibits edema (+3 BLE pitting)  Lymphadenopathy:     She has no cervical adenopathy  She has no axillary adenopathy  Neurological: She is alert and oriented to person, place, and time  Skin: Skin is warm and dry  No rash noted  She is not diaphoretic  No erythema  There is pallor  Psychiatric: She has a normal mood and affect  Her behavior is normal  Judgment and thought content normal    Vitals reviewed        Recent Results (from the past 48 hour(s))   CBC and differential    Collection Time: 03/10/20  3:55 PM   Result Value Ref Range    WBC 7 60 4 50 - 11 00 Thousand/uL    RBC 3 21 (L) 4 00 - 5 20 Million/uL    Hemoglobin 9 0 (L) 12 0 - 16 0 g/dL    Hematocrit 28 7 (L) 36 0 - 46 0 %    MCV 89 80 - 100 fL    MCH 28 0 26 0 - 34 0 pg    MCHC 31 4 31 0 - 36 0 g/dL    RDW 18 5 (H) <15 3 %    MPV 9 2 8 9 - 12 7 fL    Platelets 609 (L) 904 - 450 Thousands/uL    Neutrophils Relative 66 (H) 45 - 65 %    Lymphocytes Relative 25 25 - 45 %    Monocytes Relative 8 1 - 10 %    Eosinophils Relative 1 0 - 6 %    Basophils Relative 1 0 - 1 %    Neutrophils Absolute 5 00 1 80 - 7 80 Thousands/µL    Lymphocytes Absolute 1 90 0 50 - 4 00 Thousands/µL    Monocytes Absolute 0 60 0 20 - 0 90 Thousand/µL    Eosinophils Absolute 0 00 0 00 - 0 40 Thousand/µL    Basophils Absolute 0 10 0 00 - 0 10 Thousands/µL   Comprehensive metabolic panel    Collection Time: 03/10/20  3:55 PM   Result Value Ref Range    Sodium 140 137 - 147 mmol/L    Potassium 3 4 (L) 3 6 - 5 0 mmol/L    Chloride 102 97 - 108 mmol/L    CO2 30 22 - 30 mmol/L    ANION GAP 8 5 - 14 mmol/L    BUN 13 5 - 25 mg/dL    Creatinine 1 08 0 60 - 1 20 mg/dL    Glucose 114 (H) 70 - 99 mg/dL    Calcium 9 1 8 4 - 10 2 mg/dL    AST 35 14 - 36 U/L    ALT 30 9 - 52 U/L    Alkaline Phosphatase 127 (H) 43 - 122 U/L    Total Protein 6 7 5 9 - 8 4 g/dL    Albumin 3 2 3 0 - 5 2 g/dL    Total Bilirubin 1 40 (H) <1 30 mg/dL    eGFR 50 (L) >60 ml/min/1 73sq m   Magnesium    Collection Time: 03/10/20  3:55 PM   Result Value Ref Range    Magnesium 1 8 1 6 - 2 3 mg/dL   Troponin I    Collection Time: 03/10/20  3:55 PM   Result Value Ref Range    Troponin I 0 31 (H) 0 00 - 0 03 ng/mL   NT-BNP PRO    Collection Time: 03/10/20  3:55 PM   Result Value Ref Range    NT-proBNP 6,410 (H) 0 - 299 pg/mL   Blood culture #1    Collection Time: 03/10/20  3:55 PM   Result Value Ref Range    Blood Culture Received in Microbiology Lab  Culture in Progress  Blood culture #2    Collection Time: 03/10/20  3:55 PM   Result Value Ref Range    Blood Culture Received in Microbiology Lab  Culture in Progress      Vitamin B12    Collection Time: 03/10/20  3:55 PM   Result Value Ref Range    Vitamin B-12 329 100 - 794 pg/mL   Influenza A/B and RSV PCR    Collection Time: 03/10/20  4:00 PM   Result Value Ref Range    INFLUENZA A PCR None Detected None Detected    INFLUENZA B PCR None Detected None Detected    RSV PCR None Detected None Detected   ECG 12 lead    Collection Time: 03/10/20  4:11 PM   Result Value Ref Range    Ventricular Rate 107 BPM    Atrial Rate 107 BPM    DC Interval 172 ms    QRSD Interval 74 ms    QT Interval 308 ms    QTC Interval 411 ms    P Union 48 degrees    QRS Axis 8 degrees    T Wave Axis 33 degrees   Troponin I    Collection Time: 03/10/20  7:23 PM   Result Value Ref Range    Troponin I 0 44 (H) 0 00 - 0 03 ng/mL   ECG 12 lead    Collection Time: 03/10/20  8:07 PM   Result Value Ref Range    Ventricular Rate 104 BPM    Atrial Rate 104 BPM    DC Interval 170 ms    QRSD Interval 84 ms    QT Interval 334 ms    QTC Interval 439 ms    P Axis 52 degrees    QRS Axis -27 degrees    T Wave Axis 55 degrees   Iron Saturation %    Collection Time: 03/10/20  9:51 PM   Result Value Ref Range    Iron Saturation 7 %    TIBC 349 250 - 450 ug/dL    Iron 24 (L) 50 - 170 ug/dL   Ferritin    Collection Time: 03/10/20  9:51 PM   Result Value Ref Range    Ferritin 14 8 - 388 ng/mL   Troponin I    Collection Time: 03/10/20  9:51 PM   Result Value Ref Range    Troponin I 0 60 (H) 0 00 - 0 03 ng/mL   Comprehensive metabolic panel    Collection Time: 03/10/20  9:51 PM   Result Value Ref Range    Sodium 139 137 - 147 mmol/L    Potassium 3 9 3 6 - 5 0 mmol/L    Chloride 102 97 - 108 mmol/L    CO2 31 (H) 22 - 30 mmol/L    ANION GAP 6 5 - 14 mmol/L    BUN 13 5 - 25 mg/dL    Creatinine 1 05 0 60 - 1 20 mg/dL    Glucose 106 (H) 70 - 99 mg/dL    Calcium 9 0 8 4 - 10 2 mg/dL    AST 40 (H) 14 - 36 U/L    ALT 34 9 - 52 U/L    Alkaline Phosphatase 124 (H) 43 - 122 U/L    Total Protein 6 5 5 9 - 8 4 g/dL    Albumin 3 1 3 0 - 5 2 g/dL    Total Bilirubin 1 50 (H) <1 30 mg/dL    eGFR 52 (L) >60 ml/min/1 73sq m   TSH, 3rd generation Collection Time: 03/10/20  9:51 PM   Result Value Ref Range    TSH 3RD GENERATON 2 020 0 465 - 4 680 uIU/mL   Magnesium    Collection Time: 03/10/20  9:51 PM   Result Value Ref Range    Magnesium 1 8 1 6 - 2 3 mg/dL   Ferritin    Collection Time: 03/10/20  9:51 PM   Result Value Ref Range    Ferritin 14 8 - 388 ng/mL   Troponin I    Collection Time: 03/11/20 12:28 AM   Result Value Ref Range    Troponin I 0 72 (H) 0 00 - 0 03 ng/mL   ECG 12 lead    Collection Time: 03/11/20 12:40 AM   Result Value Ref Range    Ventricular Rate 82 BPM    Atrial Rate 82 BPM    NV Interval 172 ms    QRSD Interval 82 ms    QT Interval 366 ms    QTC Interval 427 ms    P Axis 47 degrees    QRS Axis -20 degrees    T Wave Axis 37 degrees   Troponin I    Collection Time: 03/11/20  3:02 AM   Result Value Ref Range    Troponin I 0 83 (H) 0 00 - 0 03 ng/mL   ECG 12 lead    Collection Time: 03/11/20  5:45 AM   Result Value Ref Range    Ventricular Rate 87 BPM    Atrial Rate 87 BPM    NV Interval 168 ms    QRSD Interval 84 ms    QT Interval 364 ms    QTC Interval 438 ms    P Axis 45 degrees    QRS Axis -22 degrees    T Wave Axis 26 degrees   Protime-INR    Collection Time: 03/11/20  6:14 AM   Result Value Ref Range    Protime 12 1 (H) 9 8 - 12 0 seconds    INR 1 09 0 91 - 1 09   CBC (With Platelets)    Collection Time: 03/11/20  6:14 AM   Result Value Ref Range    WBC 5 10 4 50 - 11 00 Thousand/uL    RBC 3 03 (L) 4 00 - 5 20 Million/uL    Hemoglobin 8 4 (L) 12 0 - 16 0 g/dL    Hematocrit 26 9 (L) 36 0 - 46 0 %    MCV 89 80 - 100 fL    MCH 27 6 26 0 - 34 0 pg    MCHC 31 1 31 0 - 36 0 g/dL    RDW 18 3 (H) <15 3 %    Platelets 84 (L) 059 - 450 Thousands/uL    MPV 9 2 8 9 - 12 7 fL   Lipid panel    Collection Time: 03/11/20  6:14 AM   Result Value Ref Range    Cholesterol 130 <200 mg/dL    Triglycerides 65 <150 mg/dL    HDL, Direct 40 >=40 mg/dL    LDL Calculated 77 <130 mg/dL    Non-HDL-Chol (CHOL-HDL) 90 mg/dl   Procalcitonin    Collection Time: 03/11/20  6:14 AM   Result Value Ref Range    Procalcitonin <0 05 <=0 25 ng/ml   Folate    Collection Time: 03/11/20  6:14 AM   Result Value Ref Range    Folate 6 5 3 1 - 17 5 ng/mL   Troponin I    Collection Time: 03/11/20  6:14 AM   Result Value Ref Range    Troponin I 1 03 (H) 0 00 - 0 03 ng/mL   Troponin I    Collection Time: 03/11/20  9:27 AM   Result Value Ref Range    Troponin I 0 99 (H) 0 00 - 0 03 ng/mL       Xr Chest 1 View Portable    Result Date: 3/10/2020  Narrative: CHEST INDICATION:   cough x1 week, sob  COMPARISON:  None EXAM PERFORMED/VIEWS:  XR CHEST PORTABLE FINDINGS:  Hypoventilation is seen The cardiomediastinal silhouette is obscured Bibasilar opacities appears be related to effusions with probable consolidation at the left lung base  There is also perihilar infiltrate seen on the right  There is no pneumothorax Osseous structures appear within normal limits for patient age  Impression: Pleural-parenchymal density at the bases as well as perihilar infiltrate  Pneumonia is possible  Follow-up PA and lateral film is advised better assess the hilar region  The study was marked in [facility] for immediate notification  Workstation performed: [id number]     Ct Chest Wo Contrast    Result Date: 3/11/2020  Narrative: CT CHEST WITHOUT IV CONTRAST INDICATION:   Cough  COMPARISON:  None  TECHNIQUE: CT examination of the chest was performed without intravenous contrast   Axial, sagittal, and coronal 2D reformatted images were created from the source data and submitted for interpretation  Radiation dose length product (DLP) for this visit:  883 mGy-cm   This examination, like all CT scans performed in the [facility], was performed utilizing techniques to minimize radiation dose exposure, including the use of iterative reconstruction and automated exposure control  FINDINGS: Mild motion artifact  Equivocal nodularity LUNGS:  Small right upper lobe groundglass alveolar infiltrate  Bibasilar left greater than right, lingula, and right middle lobe subsegmental atelectasis  Central airways are clear  PLEURA:  Small to moderate layering bilateral pleural effusions left larger than right  HEART/GREAT VESSELS:  Heart is enlarged  No pericardial effusion  Aortic and coronary artery calcification  Incidentally noted medial retropharyngeal course of bilateral common carotid arteries, a normal variant  MEDIASTINUM AND AIDA:  Unremarkable  CHEST WALL AND LOWER NECK:   Mild body wall edema  VISUALIZED STRUCTURES IN THE UPPER ABDOMEN:  Equivocal nodular surface of the partially visualized liver  Multiple calcified gallstones in the gallbladder  Enlarged geraldine hepatic lymph node measuring 1 5 cm short axis image 20 series 7  3 1 cm partially visualized left renal simple cysts  OSSEOUS STRUCTURES:  No acute fracture or osseous destructive lesion identified  Degenerative changes of the spine  Impression: Small right upper lobe groundglass alveolar infiltrate  Multilobar subsegmental atelectasis, left greater than right  Small to moderate layering bilateral pleural effusions, left greater than right  Cardiomegaly  No pericardial effusion  Equivocal nodular liver surface  Consider hepatic cirrhosis in the appropriate clinical context  Mild geraldine hepatic adenopathy may be sequela of chronic hepatocellular disease  Consider follow-up contrast enhanced CT of the abdomen and pelvis  Cholelithiasis  This study demonstrates a significant  finding and was documented as such in [facility] for liaison and referring practitioner notification  Workstation performed: [id number]       Labs and pertinent reports reviewed  This note has been generated by voice recognition software system  Therefore, there may be spelling, grammar, and or syntax errors  Please contact if questions arise

## 2020-03-11 NOTE — ASSESSMENT & PLAN NOTE
Wt Readings from Last 3 Encounters:   03/11/20 114 kg (251 lb 5 2 oz)   Exacerbation she has +3 pitting edema up to the knees at this point  She has pleural effusions there is lot of cephalization  She   Has not seen a doctor in 20 years  Lost 4lb , urine output 1300 but still sig fluid overloaded- increase lasix to 60 mgiv tid, fluid restriction to 1500ml   Labs in am   2decho   vde lower extremities

## 2020-03-11 NOTE — PHYSICAL THERAPY NOTE
Physical Therapy Evaluation and Treatment    Patient's Name: Jesu Smith    Admitting Diagnosis  Shortness of breath [R06 02]  CHF (congestive heart failure) ([facility] [address] ) [I50 9]  Pleural effusion [J90]  Hypoxia [R09 02]  Elevated troponin [R79 89]  Cough in adult patient [R05]    Problem List  Patient Active Problem List   Diagnosis    Acute respiratory failure with hypoxia (HCC)    Acute heart failure (HCC)    Troponin I above reference range    Acute anemia    Thrombocytopenia ([facility] [address] )    Vitamin B12 deficiency       Past Medical History  History reviewed  No pertinent past medical history  Past Surgical History  Past Surgical History:   Procedure Laterality Date    APPENDECTOMY      HYSTERECTOMY      TONSILLECTOMY         Vitals:    03/11/20 0557 03/11/20 0600 03/11/20 0741 03/11/20 0826   BP: 136/74  137/73    BP Location:   Left arm    Pulse:   81 82   Resp:   20 20   Temp:   (!) 96 5 °F (35 8 °C)    TempSrc:   Temporal    SpO2:   97% 97%   Weight:  114 kg (251 lb 5 2 oz)     Height:            03/11/20 0850   Pain Assessment   Pain Assessment Tool 0-10   Pain Score No Pain   Restrictions/Precautions   Other Precautions Fall Risk;Telemetry;O2;Multiple lines   General   Family/Caregiver Present Yes  (dtr)   Cognition   Overall Cognitive Status WFL   Orientation Level Oriented X4   Memory Within functional limits   Following Commands Follows all commands and directions without difficulty   Bed Mobility   Supine to Sit 5  Supervision   Additional items Increased time required; Bedrails   Sit to Supine 3  Moderate assistance   Additional items Assist x 1; Increased time required;Verbal cues;LE management; Bedrails   Additional Comments HOB elevated at all times 2* to CHF    Transfers   Sit to Stand 5  Supervision   Additional items Increased time required;Verbal cues;Armrests   Stand to Sit 5  Supervision   Additional items Armrests; Increased time required;Verbal cues   Additional Comments pt using SPC for transfers   Ambulation/Elevation   Gait pattern Excessively slow; Short stride;Decreased foot clearance; Wide LIZETT   Gait Assistance 4  Minimal assist   Additional items Assist x 1;Verbal cues; Tactile cues   Assistive Device SPC   Distance 25ft  (on RA; pt desat to 81%; back to 95% on 3L)   Stair Management Assistance Not tested   Balance   Static Sitting Good   Dynamic Sitting Fair +   Static Standing Fair   Dynamic Standing Fair -   Ambulatory Fair -   Endurance Deficit   Endurance Deficit Yes   Endurance Deficit Description SOB/KNOX; desat to 81% with 25ft amb on RA, desat to 91% seated EOB on RA   Activity Tolerance   Activity Tolerance Treatment limited secondary to medical complications (Comment); Patient limited by fatigue   Medical Staff Made Aware spoke to [facility]   Nurse Made Aware spoke to RN   Assessment   Prognosis Fair   Problem List Decreased endurance; Impaired balance;Decreased mobility;Obesity   Assessment Jesu Smith is a 75 y o  female admitted to [facility] on 3/10/2020 for Acute respiratory failure with hypoxia ([address] )  Pt  has no past medical history on file    PT was consulted and pt was seen on 3/11/2020 for mobility assessment and d/c planning  Pt presents supine in bed with HOB elevated on 3L NC O2  She is not reporting any pain at this time  She has functional strength in the UE and LEs  Pt is currently functioning at a supervision assistance x1 level for bed mobility, supervision assistance x1 level for transfers, minimum assistance x1 level for ambulation with [address], she was unable to attempt elevation training today  Pt was saturating above 95% on 3L NC O2, when seated EOB on RA she desaturated to 91% and with short ambulation trial she developed significant SOB and KNOX desaturating to 81%, recovered to 95% with seated rest and 3L O2  She is primarily limited by cardiovascular endurance at this time   Pt will benefit from continued skilled IP PT to address the above mentioned impairments in order to maximize recovery and increase functional independence when completing mobility and ADLs  Currently PT recommendations for DME include SPC for ambulation and transfers and a tub bench for tub transfers  At this time PT recommendations for d/c are inpt rehab vs home pending improvement in pts functional status  Pts dtr reporting that she is likely to be resistive to rehab recommendation  Barriers to Discharge Inaccessible home environment   Goals   Patient Goals to get back home   STG Expiration Date 03/21/20   Short Term Goal #1 1) Bed mobility skills with modified independent assistance to facilitate safe return to previous living environment 2) Functional transfers with modified independent assistance to facilitate safe return to previous living environment  3) Ambulation with least restrictive AD modified independent assistance without LOB and stable vitals for safe ambulation home/ community distances  4) Stair training up/down flight 12 step/s with rail/s  and supervision assistance x1 for safe access to previous living environment  5) Improve balance grades to fair + to reduce risk of falls 6) Perform LE HEP independently  7) PT for ongoing pt and family education; DME needs and D/C planning to promote highest level of function in least restrictive environment  PT Treatment Day 1   Plan   Treatment/Interventions Functional transfer training;LE strengthening/ROM; Elevations; Therapeutic exercise; Endurance training;Patient/family training;Equipment eval/education; Bed mobility;Gait training;Spoke to nursing;Spoke to case management   PT Frequency   (3-5x/wk)   Recommendation   Recommendation   (home vs rehab pending progress)   Equipment Recommended Cane   PT - OK to Discharge No  (pt will need stair training prior to D/C; may need rehab)     Physical Therapy Treatment                                                                                                            03/11/20 0958 Transfers   Sit to Stand 5  Supervision   Additional items Increased time required;Verbal cues   Stand to Sit 5  Supervision   Additional items Increased time required;Verbal cues   Additional Comments SPC   Ambulation/Elevation   Gait pattern Excessively slow; Short stride;Decreased foot clearance; Wide LIZETT   Gait Assistance 4  Minimal assist   Additional items Assist x 1;Verbal cues; Tactile cues   Assistive Device [facility]   Distance 10ft   Stair Management Assistance Not tested   Balance   Static Sitting Good   Dynamic Sitting Fair +   Static Standing Fair   Dynamic Standing Fair -   Ambulatory Fair -   Endurance Deficit   Endurance Deficit Yes   Endurance Deficit Description SOB/KNOX   Activity Tolerance   Activity Tolerance Patient limited by fatigue;Treatment limited secondary to medical complications (Comment)   Nurse Made Aware Spoke to RN   Exercises   Balance training  Standing Balance) pt stood EOB while changing gown and washing with SPC for 5-7 minutes  Assessment   Prognosis Fair   Problem List Decreased endurance; Impaired balance;Decreased mobility;Obesity   Barriers to Discharge Inaccessible home environment       Sam Mauricio PT, DPT

## 2020-03-11 NOTE — OCCUPATIONAL THERAPY NOTE
Occupational Therapy Evaluation     Patient Name: Darrell Villarreal  Today's Date: 3/11/2020  Problem List  Principal Problem:    Acute respiratory failure with hypoxia ([address] )  Active Problems:    Acute heart failure (HCC)    Troponin I above reference range    Acute anemia    Thrombocytopenia (HCC)    Vitamin B12 deficiency    Past Medical History  History reviewed  No pertinent past medical history    Past Surgical History  Past Surgical History:   Procedure Laterality Date    APPENDECTOMY      HYSTERECTOMY      TONSILLECTOMY               03/11/20 1330   Note Type   Note type Eval only   Restrictions/Precautions   Other Precautions Fall Risk;O2;Telemetry   Pain Assessment   Pain Assessment Tool Pain Assessment not indicated - pt denies pain   Home Living   Type of [address] [place] [address] Two level;Stairs to enter with rails;Bed/bath upstairs  (4 HEATH)   Bathroom Shower/Tub Tub/shower unit   Bathroom Toilet Standard   Bathroom Equipment Shower chair   [address]   Prior Function   Level of [place] Independent with ADLs and functional mobility   Lives With Daughter  (& grandchild )   Receives Help From Family   ADL Assistance Independent   IADLs Needs assistance   Falls in the last 6 months 0   Vocational Retired   Psychosocial   Psychosocial (WDL) WDL   Subjective   Subjective "I don't want to go anywhere"   ADL   Eating Assistance 5  Supervision/Setup   Grooming Assistance 5  Supervision/Setup   UB Bathing Assistance 5  Supervision/Setup   LB Bathing Assistance 4  Minimal Assistance   UB Dressing Assistance 5  Supervision/Setup   LB Dressing Assistance 4  [address]  3  Moderate Assistance   Bed Mobility   Supine to Sit 4  Minimal assistance   Additional items Assist x 1   Sit to Supine 4  Minimal assistance   Additional items Assist x 1   Transfers   Sit to Stand 5  Supervision   Additional items Increased time required;Verbal cues   Stand to Sit 5  Supervision   Additional items Increased time required;Verbal cues   Functional Mobility   Functional Mobility   (CGA )   Additional Comments household distances    Balance   Static Sitting Good   Dynamic Sitting Fair +   Static Standing Fair   Dynamic Standing Fair   Ambulatory Fair   Activity Tolerance   Activity Tolerance Patient limited by fatigue   RUE Assessment   RUE Assessment WFL   LUE Assessment   LUE Assessment WFL   Hand Function   Gross Motor Coordination Functional   Fine Motor Coordination Functional   Perception   Inattention/Neglect Appears intact   Cognition   Overall Cognitive Status WFL   Arousal/Participation Alert; Cooperative   Attention Within functional limits   Orientation Level Oriented X4   Memory Within functional limits   Following Commands Follows all commands and directions without difficulty   Assessment   Limitation Decreased ADL status; Decreased UE strength;Decreased Safe judgement during ADL;Decreased cognition;Decreased endurance;Decreased high-level ADLs   Assessment   Pt is a 75 y o  female seen for OT evaluation s/p admit to [facility] on 3/10/2020 w/ Acute respiratory failure with hypoxia ([facility] [address] )  Comorbidities affecting pt's functional performance at time of assessment include: heart failure, anemia, hypoxia, elevated troponins    Personal factors affecting pt at time of IE include:limited home support, difficulty performing ADLS, difficulty performing IADLS , health management  and environment  Upon evaluation: Pt requires SBA-CGA for ambulation, Carl for LB ADLs  The following deficits impact occupational performance: weakness, decreased strength, decreased balance, decreased tolerance, impaired problem solving and decreased safety awareness  Pt to benefit from continued skilled OT services while in the hospital to address deficits as defined above and maximize level of functional independence w ADL's and functional mobility   Occupational performance areas to address include: grooming, bathing/shower, toilet hygiene, health maintenance, functional mobility and clothing management  From OT standpoint, recommendation at time of d/c would be home vs rehab, pending progress during acute stay  If discharging home, Pt would benefit from a tub transfer bench  Goals   STG Time Frame   (1 week )   Short Term Goal  1  Pt will complete bed mobility Charo  2   Pt will complete bathroom mobility Charo  3   Pt will complete LB dressing Charo  Plan   Treatment Interventions ADL retraining;Functional transfer training;UE strengthening/ROM; Endurance training;Continued evaluation; Activityengagement   Goal Expiration Date 03/18/20   OT Frequency 2-3x/wk   Recommendation   OT Discharge Recommendation Other (Comment)  (home vs rehab pending progress )  Tub transfer bench

## 2020-03-11 NOTE — PROGRESS NOTES
Progress Note - Guillermo [facility] 1945, 75 y o  female MRN: [id number]    Unit/Bed#: 7T [facility] 703-02 Encounter: [id number]    Primary Care Provider: No primary care provider on file  Date and time admitted to hospital: 3/10/2020  3:41 PM        Vitamin B12 deficiency  Assessment & Plan  · Level at her age should be >400 , jwill start oral 1000mcg po daily     Thrombocytopenia ([address] )  Assessment & Plan  · Has not seen a physician in 21 years  plt decrease more to 84,000 dc dvt prophylaxis- folate level pending, no sepsis , suppl vitamin b12- aksed hem onc to see may need bone marrow biopsy     Acute anemia  Assessment & Plan  · Patient has not seen a physician in 20 years  Her hemoglobin is 9  Will check anemia profile vitamin B12 - low for her age <400 start oral suppl /folic acid  Will check Hemoccult- patient reports no melena or hematochezia   Monitor while on aspirin as plt dropped below 100,000 will hold lovenox     Troponin I above reference range  Assessment & Plan  · She has no acute chest pain but she does have acute CHF  Continue aspirin 81mg po daily -> will hold plavix and ac as low hb and thrombocytopenia of unknown etiology   Will be monitored and trended with troponin going up trending   2D echo will be obtained this could most likely be secondary to demand ischemia secondary to heart failure  EKG has no acute ST or T-wave changes  Lipid panel- acceptable   Acute heart failure (HCC)  Assessment & Plan  Wt Readings from Last 3 Encounters:   03/11/20 114 kg (251 lb 5 2 oz)   Exacerbation she has +3 pitting edema up to the knees at this point  She has pleural effusions there is lot of cephalization  She   Has not seen a doctor in 20 years  Lost 4lb , urine output 1300 but still sig fluid overloaded- increase lasix to 60 mgiv tid, fluid restriction to 1500ml   Labs in am   2decho   vde lower extremities      * Acute respiratory failure with hypoxia (HCC)  Assessment & Plan  · Secondary to new onset heart failure exacerbation she does have pleural effusions  She does not have any fever or white count  It do not suspect this is secondary to pneumonia with the chest x-ray listing  She has a white productive cough sputum  And her proBNP is over 6000  Continue oxygen with keeping sats of greater than 92% diuresis trending troponins 2D echo  I will get a CT chest without contrast to visualize more- still pending  Check procalcitonin still pending  No fevers  Cough improved, weight is down but patient is still fluid overloaded her renal is stable from last night will increase Lasix 60 mg IV t i d  VTE Pharmacologic Prophylaxis:   Pharmacologic: Pharmacologic VTE Prophylaxis contraindicated due to thrombocytopenia  Mechanical VTE Prophylaxis in Place: No    Patient Centered Rounds: I have performed bedside rounds with nursing staff today  Discussions with Specialists or Other Care Team Provider: discussed with cardiology     Education and Discussions with Family / Patient: patient and daughter     Time Spent for Care: 30 minutes  More than 50% of total time spent on counseling and coordination of care as described above  Current Length of Stay: 1 day(s)    Current Patient Status: Inpatient   Certification Statement: The patient will continue to require additional inpatient hospital stay due to heart failure    Discharge Plan: pending progress    Code Status: Level 3 - DNAR and DNI      Subjective:   Patient seen and examined no chest pain states breathing a little better and cough is improved  No melena no bright red blood per rectum  Objective:     Vitals:   Temp (24hrs), Av 4 °F (36 3 °C), Min:96 5 °F (35 8 °C), Max:98 °F (36 7 °C)    Temp:  [96 5 °F (35 8 °C)-98 °F (36 7 °C)] 96 5 °F (35 8 °C)  HR:  [] 82  Resp:  [20-36] 20  BP: (121-168)/() 137/73  SpO2:  [91 %-100 %] 97 %  Body mass index is 46 72 kg/m²       Input and Output Summary (last 24 hours): Intake/Output Summary (Last 24 hours) at 3/11/2020 1000  Last data filed at 3/11/2020 0631  Gross per 24 hour   Intake 720 ml   Output 2000 ml   Net -1280 ml       Physical Exam:     Physical Exam   Constitutional: She is oriented to person, place, and time  Morbid obesity   HENT:   Head: Normocephalic and atraumatic  Eyes: Pupils are equal, round, and reactive to light  EOM are normal    Neck: Normal range of motion  Cardiovascular: Normal rate, regular rhythm and normal heart sounds  Pulmonary/Chest: Effort normal  She has rales (Up to meds)  Abdominal: Soft  Bowel sounds are normal  She exhibits no distension  There is no tenderness  Musculoskeletal: Normal range of motion  She exhibits edema (Plus three pitting edema)  Neurological: She is alert and oriented to person, place, and time  She has normal reflexes  cranial nerve 2-12 are normal   Normal neurological exam   Skin: Skin is warm  Psychiatric: She has a normal mood and affect  Additional Data:     Labs:    Results from last 7 days   Lab Units 03/11/20  0614 03/10/20  1555   WBC Thousand/uL 5 10 7 60   HEMOGLOBIN g/dL 8 4* 9 0*   HEMATOCRIT % 26 9* 28 7*   PLATELETS Thousands/uL 84* 103*   NEUTROS PCT %  --  66*   LYMPHS PCT %  --  25   MONOS PCT %  --  8   EOS PCT %  --  1     Results from last 7 days   Lab Units 03/10/20  2151   SODIUM mmol/L 139   POTASSIUM mmol/L 3 9   CHLORIDE mmol/L 102   CO2 mmol/L 31*   BUN mg/dL 13   CREATININE mg/dL 1 05   ANION GAP mmol/L 6   CALCIUM mg/dL 9 0   ALBUMIN g/dL 3 1   TOTAL BILIRUBIN mg/dL 1 50*   ALK PHOS U/L 124*   ALT U/L 34   AST U/L 40*   GLUCOSE RANDOM mg/dL 106*     Results from last 7 days   Lab Units 03/11/20  0614   INR  1 09                       * I Have Reviewed All Lab Data Listed Above  * Additional Pertinent Lab Tests Reviewed:  All Labs Within Last 24 Hours Reviewed    Imaging:    Imaging Reports Reviewed Today Include:  CT of the chest final is pending  Imaging Personally Reviewed by Myself Includes:      Recent Cultures (last 7 days):     Results from last 7 days   Lab Units 03/10/20  1555   BLOOD CULTURE  Received in Microbiology Lab  Culture in Progress  Received in Microbiology Lab  Culture in Progress  Last 24 Hours Medication List:     Current Facility-Administered Medications:  aspirin 81 mg Oral Daily Twin Bryson MD   benzonatate 100 mg Oral TID PRN Twin Bryson MD   cyanocobalamin 1,000 mcg Oral Daily Twin Bryson MD   furosemide 20 mg Intravenous Once Twin Bryson MD   furosemide 60 mg Oral TID (diuretic) Twin Bryson MD   ipratropium-albuterol 3 mL Nebulization Q6H PRN Kevin Sicard, CRNP   nitroglycerin 0 4 mg Sublingual Q5 Min PRN Twin Bryson MD   potassium chloride 20 mEq Oral Daily Twin Bryson MD        Today, Patient Was Seen By: Twin Bryson MD    ** Please Note: Dictation voice to text software may have been used in the creation of this document   **

## 2020-03-11 NOTE — CONSULTS
ENCOUNTER DATE: 03/12/20 10:56 AM  PATIENT NAME: Gordo Alvarez   1945    [id number]  Age: 75 y o  Sex: female  AUTHOR: Amanda Best MD  [facility] PHYSICIAN: No primary care provider on file  INPATIENT ATTENDING PHYSICIAN: Manda Tello MD  *-*-*-*-*-*-*-*-*-*-*-*-*-*-*-*-*-*-*-*-*-*-*-*-*-*-*-*-*-*-*-*-*-*-*-*-*-*-*-*-*-*-*-*-*-*-*-*-*-*-*-*-*-*-  REASON FORCONSULTATION:  Newly diagnosed decompensated heart failure, unspecified cardiomyopathy    *-*-*-*-*-*-*-*-*-*-*-*-*-*-*-*-*-*-*-*-*-*-*-*-*-*-*-*-*-*-*-*-*-*-*-*-*-*-*-*-*-*-*-*-*-*-*-*-*-*-*-*-*-*-  HISTORY OF PRESENT ILLNESS:  Patient is a 65-year-old  female who has no prior significant history and was not followed with physician for lost almost 20 years  Patient has presented with 1 week history of progressive shortness of breath, productive cough and fatigue  She is found to be in decompensated heart failure  Patient reports that she has been progressively short of breath for last 4-5 weeks  She reports being very sedentary and has not come downstairs from her 2nd floor room since [place]  Apparently her family brings her the food of stairs and she spends her time watching television and reading books  She reports that gradually she has become more and more short of breath with symptoms becoming worse in the last 1 week   She denies typical chest pain but has orthopnea and PND  She reports being compliant with medications  She reports feeling much better since receiving IV Lasix  She reports that she has not been to any physicians because she feels well most of the time and she attributed her symptoms to some upper respiratory illness which she was hoping to get over without medical intervention  *-*-*-*-*-*-*-*-*-*-*-*-*-*-*-*-*-*-*-*-*-*-*-*-*-*-*-*-*-*-*-*-*-*-*-*-*-*-*-*-*-*-*-*-*-*-*-*-*-*-*-*-*-*  PAST MEDICAL HISTORY:   History reviewed  No pertinent past medical history      PAST SURGICAL HISTORY: Past Surgical History:   Procedure Laterality Date    APPENDECTOMY      HYSTERECTOMY      TONSILLECTOMY         FAMILY HISTORY:  History reviewed  No pertinent family history  No family history of premature CAD or sudden cardiac death      SOCIAL HISTORY:  [unfilled]  Social History     Tobacco Use   Smoking Status Former Smoker   Smokeless Tobacco Never Used     Social History     Substance and Sexual Activity   Alcohol Use Not Currently    Alcohol/week: 1 0 standard drinks    Types: 1 Shots of liquor per week    Frequency: Monthly or less    Drinks per session: 1 or 2    Binge frequency: Never     Social History     Substance and Sexual Activity   Drug Use Never     [id number]    *-*-*-*-*-*-*-*-*-*-*-*-*-*-*-*-*-*-*-*-*-*-*-*-*-*-*-*-*-*-*-*-*-*-*-*-*-*-*-*-*-*-*-*-*-*-*-*-*-*-*-*-*-*  ALLERGIES:  Allergies   Allergen Reactions    Sulfa Antibiotics Hallucinations     *-*-*-*-*-*-*-*-*-*-*-*-*-*-*-*-*-*-*-*-*-*-*-*-*-*-*-*-*-*-*-*-*-*-*-*-*-*-*-*-*-*-*-*-*-*-*-*-*-*-*-*-*-*  CURRENT HOSPITAL MEDICATIONS:     Current Facility-Administered Medications:     aspirin chewable tablet 81 mg, 81 mg, Oral, Daily, Malinda Santos MD, 81 mg at 03/12/20 0806    benzonatate (TESSALON PERLES) capsule 100 mg, 100 mg, Oral, TID PRN, Malinda Santos MD, 100 mg at 03/11/20 1803    cyanocobalamin (VITAMIN B-12) tablet 1,000 mcg, 1,000 mcg, Oral, Daily, Malinda Santos MD, 1,000 mcg at 03/12/20 0807    dextromethorphan-guaiFENesin (ROBITUSSIN DM)  mg/5 mL oral syrup 10 mL, 10 mL, Oral, Q4H PRN, Malinda Santos MD, 10 mL at 48/68/57 9776    folic acid (FOLVITE) tablet 1 mg, 1 mg, Oral, Daily, EUN Cross, 1 mg at 03/12/20 0807    ipratropium-albuterol (DUO-NEB) 0 5-2 5 mg/3 mL inhalation solution 3 mL, 3 mL, Nebulization, Q6H PRN, EUN Edgar, 3 mL at 03/11/20 0824    iron sucrose (VENOFER) 200 mg in sodium chloride 0 9 % 100 mL IVPB, 200 mg, Intravenous, Every Other Day, EUN Villaseñor, Last Rate: 110 mL/hr at 03/11/20 1643, 200 mg at 03/11/20 1643    metoprolol tartrate (LOPRESSOR) tablet 25 mg, 25 mg, Oral, Q12H [facility], Dawna Archibald MD    nitroglycerin (NITROSTAT) SL tablet 0 4 mg, 0 4 mg, Sublingual, Q5 Min PRN, Jeff Bansal MD    nystatin (MYCOSTATIN) powder, , Topical, BID, Jeff Bansal MD    potassium chloride (K-DUR,KLOR-CON) CR tablet 20 mEq, 20 mEq, Oral, Daily, Jeff Bansal MD, 20 mEq at 03/12/20 8774    *-*-*-*-*-*-*-*-*-*-*-*-*-*-*-*-*-*-*-*-*-*-*-*-*-*-*-*-*-*-*-*-*-*-*-*-*-*-*-*-*-*-*-*-*-*-*-*-*-*-*-*-*-*  REVIEW OF SYMPTOMS:    Positive for:  Shortness of breath and lower extremity swelling which are improved  Negative for: All remaining as reviewed below and in HPI      SYSTEM SYMPTOMS REVIEWED:  General--weight change, fever, night sweats  Respiratoryl-- Wheezing, shortness of breath, cough, URI symptoms, sputum, blood  Cardiovascular--chest pain, syncope, dyspnea on exertion, edema, decline in exercise tolerance, claudication   Gastrointestinal--persistent vomiting, diarrhea, abdominal distention, blood in stool   Muscular or skeletal--joint pain or swelling   Neurologic--headaches, syncope, abnormal movement  Hematologic--history of easy bruising and bleeding   Endocrine--thyroid enlargement, heat or cold intolerance, polyuria   Psychiatric--anxiety, depression     *-*-*-*-*-*-*-*-*-*-*-*-*-*-*-*-*-*-*-*-*-*-*-*-*-*-*-*-*-*-*-*-*-*-*-*-*-*-*-*-*-*-*-*-*-*-*-*-*-*-*-*-*-*-  VITAL SIGNS:  Vitals:    03/12/20 0500 03/12/20 0600 03/12/20 0736 03/12/20 1029   BP: 141/75  113/61 136/79   BP Location:   Left arm Left arm   Pulse:   80    Resp:   18    Temp:   (!) 96 1 °F (35 6 °C)    TempSrc:   Temporal    SpO2:   96%    Weight:  112 kg (246 lb 0 5 oz)     Height:         Weight (last 2 days)     Date/Time   Weight    03/12/20 0600   112 (246 03)    03/11/20 0600   114 (251 32)    03/10/20 1821   116 (255 95)    03/10/20 1548   120 (265 2)           ,   Wt Readings from Last 3 Encounters:   03/12/20 112 kg (246 lb 0 5 oz)    , Body mass index is 45 73 kg/m²  *-*-*-*-*-*-*-*-*-*-*-*-*-*-*-*-*-*-*-*-*-*-*-*-*-*-*-*-*-*-*-*-*-*-*-*-*-*-*-*-*-*-*-*-*-*-*-*-*-*-*-*-*-*-  PHYSICAL EXAM:  General Appearance:    Alert, cooperative, appears stated age, morbidly obese, in mild respiratory distress, poor hygiene   Head, Eyes, ENT:    No obvious abnormality, moist mucous mebranes  Neck:   Supple, JVD is difficult to assess because of obesity in the neck  Back:     Symmetric, no curvature  Lungs:     Respirations unlabored  decreased breath sounds bilaterally with rhonchi and fine crackles at bases  Chest wall:    No tenderness or deformity   Heart:    Regular rate and rhythm, S1 and S2 normal, no murmur, rub  or gallop  Unable to appreciate S3-S4  Abdomen:     Soft, non-tender, No obvious masses, or organomegaly   Extremities:    grade 2-3 lower extremity edema without venous static changes   Skin:   Skin color, texture, turgor normal, no rashes or lesions     *-*-*-*-*-*-*-*-*-*-*-*-*-*-*-*-*-*-*-*-*-*-*-*-*-*-*-*-*-*-*-*-*-*-*-*-*-*-*-*-*-*-*-*-*-*-*-*-*-*-*-*-*-*-  LABORATORY DATA:  I have personally reviewed pertinent labs      CMP:   Results from last 7 days   Lab Units 03/12/20  0451 03/10/20  2151 03/10/20  1555   SODIUM mmol/L 139 139 140   POTASSIUM mmol/L 3 8 3 9 3 4*   CHLORIDE mmol/L 96* 102 102   CO2 mmol/L 37* 31* 30   BUN mg/dL 17 13 13   CREATININE mg/dL 1 27* 1 05 1 08   CALCIUM mg/dL 9 0 9 0 9 1   ALK PHOS U/L  --  124* 127*   ALT U/L  --  34 30   AST U/L  --  40* 35       Cardiac Profile:   Troponin I   Date Value Ref Range Status   03/11/2020 0 99 (H) 0 00 - 0 03 ng/mL Final     Comment:       Negative: 0 00-0 03  Indeterminate: 0 04-0 12  Positive: >0 12     03/11/2020 1 03 (H) 0 00 - 0 03 ng/mL Final     Comment:       Negative: 0 00-0 03  Indeterminate: 0 04-0 12  Positive: >0 12     03/11/2020 0 83 (H) 0 00 - 0 03 ng/mL Final     Comment:       Negative: 0  00-0 03  Indeterminate: 0 04-0 12  Positive: >0 12       NT-proBNP   Date Value Ref Range Status   03/10/2020 6,410 (H) 0 - 299 pg/mL Final       CBC:   Results from last 7 days   Lab Units 03/12/20  0451 03/11/20  0614 03/10/20  1555   WBC Thousand/uL 5 30 5 10 7 60   HEMOGLOBIN g/dL 8 4* 8 4* 9 0*   HEMATOCRIT % 26 7* 26 9* 28 7*   PLATELETS Thousands/uL 88* 84* 103*       PT/INR:   No results found for: PT, INR,     Magnesium:   Results from last 7 days   Lab Units 03/12/20  0451 03/10/20  2151 03/10/20  1555   MAGNESIUM mg/dL 1 9 1 8 1 8       Phosphorous:       Microbiology:  Results from last 7 days   Lab Units 03/10/20  1555   BLOOD CULTURE  No Growth at 24 hrs  No Growth at 24 hrs  *-*-*-*-*-*-*-*-*-*-*-*-*-*-*-*-*-*-*-*-*-*-*-*-*-*-*-*-*-*-*-*-*-*-*-*-*-*-*-*-*-*-*-*-*-*-*-*-*-*-*-*-*-*-  RADIOLOGY RESULTS:  Xr Chest 1 View Portable    Result Date: 3/10/2020  Impression: Pleural-parenchymal density at the bases as well as perihilar infiltrate  Pneumonia is possible  Follow-up PA and lateral film is advised better assess the hilar region  The study was marked in [facility] for immediate notification   Workstation performed: [id number]       *-*-*-*-*-*-*-*-*-*-*-*-*-*-*-*-*-*-*-*-*-*-*-*-*-*-*-*-*-*-*-*-*-*-*-*-*-*-*-*-*-*-*-*-*-*-*-*-*-*-*-*-*-*-  CURRENT ECG:  Results for orders placed or performed during the hospital encounter of 03/10/20   ECG 12 lead   Result Value    Ventricular Rate 87    Atrial Rate 87    NV Interval 168    QRSD Interval 84    QT Interval 364    QTC Interval 438    P Axis 45    QRS Axis -22    T Wave Axis 26    Narrative    Normal sinus rhythm  Low voltage QRS  Nonspecific ST-t wave changes  Abnormal ECG  When compared with ECG of 11-MAR-2020 05:44, (unconfirmed)  Serial changes of Anterior infarct Present  Confirmed by Alexia Vasquez ([id number]) on 3/11/2020 8:02:05 AM       ECHOCARDIOGRAM AND OTHER CARDIOLOGY RESULTS:  Echocardiogram March 11, 2020  Technical quality: Fair but adequate  Technically difficult study due to body habitus and poor echo penetration  Cardiac rhythm: Sinus     1  Mild concentric left ventricular hypertrophy, normal left ventricular systolic function, grade 2 diastolic dysfunction  EF around 55%  2  Mildly dilated right ventricle with normal right ventricular systolic function  3  Mild biatrial enlargement  4  Aortic valve sclerosis and calcification without stenosis or regurgitation  5  Moderate mitral annular calcification, mild-to-moderate mitral valve regurgitation  6  Mild-to-moderate tricuspid valve regurgitation  7  Mild-to-moderate pulmonary hypertension  Estimated RVSP/PASP is 53 mmHg  8  No pericardial effusion  Suspected left-sided pleural effusion  *-*-*-*-*-*-*-*-*-*-*-*-*-*-*-*-*-*-*-*-*-*-*-*-*-*-*-*-*-*-*-*-*-*-*-*-*-*-*-*-*-*-*-*-*-*-*-*-*-*-*-*-*-*-  CARDIOLOGY ASSESSMENT & PLAN:    1  Decompensated acute diastolic heart failure  2  Type 2 MI likely secondary to demand ischemia  3  Unspecified anemia and thrombocytopenia     Symptoms are suggestive of decompensated heart failure  Echocardiogram shows normal left ventricular systolic function and grade 2 diastolic dysfunction and moderate pulmonary hypertension     - continue IV diuresis with furosemide 60 mg IV b i d  And transition to p o  Tomorrow if patient is better  - agree with metoprolol tartrate 25 mg twice daily  - further recommendations to follow based on response to diuresis therapy  - patient not on antiplatelet therapy due to suspected myelodysplastic syndrome  *-*-*-*-*-*-*-*-*-*-*-*-*-*-*-*-*-*-*-*-*-*-*-*-*-*-*-*-*-*-*-*-*-*-*-*-*-*-*-*-*-*-*-*-*-*-*-*-*-*-*-*-*-*-  SIGNATURES:   [unfilled]   Katya Rodriguez MD     CC: No primary care provider on file  No ref   provider found

## 2020-03-11 NOTE — NURSING NOTE
Patient was noted to have increased tachypnea and dyspnea at rest  Patient stated "I am scared" after CT scan was completed  Patient also reported anxiety related to hospitalization  NP notified and EKG obtained  Patient supplemental O2 increased to ensure spO2 remains above 92%  1x dose of Xanax administered per order and patient received breathing treatment  On reassessment patient pleasant, appears comfortable in bed  Respiratory pattern WNL and patient denies SOB/difficulty breathing  On tele monitor patient NSR  Call bell remains within reach

## 2020-03-12 PROBLEM — K80.20 CHOLELITHIASIS: Status: ACTIVE | Noted: 2020-01-01

## 2020-03-12 PROBLEM — I50.31 ACUTE DIASTOLIC HEART FAILURE (HCC): Status: ACTIVE | Noted: 2020-01-01

## 2020-03-12 PROBLEM — I47.2 VENTRICULAR TACHYCARDIA (HCC): Status: ACTIVE | Noted: 2020-01-01

## 2020-03-12 PROBLEM — K74.60 CIRRHOSIS OF LIVER WITHOUT ASCITES (HCC): Status: ACTIVE | Noted: 2020-01-01

## 2020-03-12 NOTE — PLAN OF CARE
Problem: Potential for Falls  Goal: Patient will remain free of falls  Description  INTERVENTIONS:  - Assess patient frequently for physical needs  -  Identify cognitive and physical deficits and behaviors that affect risk of falls    -  [place] fall precautions as indicated by assessment   - Educate patient/family on patient safety including physical limitations  - Instruct patient to call for assistance with activity based on assessment  - Modify environment to reduce risk of injury  - Consider OT/PT consult to assist with strengthening/mobility  Outcome: Progressing     Problem: PAIN - ADULT  Goal: Verbalizes/displays adequate comfort level or baseline comfort level  Description  Interventions:  - Encourage patient to monitor pain and request assistance  - Assess pain using appropriate pain scale  - Administer analgesics based on type and severity of pain and evaluate response  - Implement non-pharmacological measures as appropriate and evaluate response  - Consider cultural and social influences on pain and pain management  - Notify physician/advanced practitioner if interventions unsuccessful or patient reports new pain  Outcome: Progressing     Problem: DISCHARGE PLANNING  Goal: Discharge to home or other facility with appropriate resources  Description  INTERVENTIONS:  - Identify barriers to discharge w/patient and caregiver  - Arrange for needed discharge resources and transportation as appropriate  - Identify discharge learning needs (meds, wound care, etc )  - Arrange for interpretive services to assist at discharge as needed  - Refer to Case Management Department for coordinating discharge planning if the patient needs post-hospital services based on physician/advanced practitioner order or complex needs related to functional status, cognitive ability, or social support system  Outcome: Progressing     Problem: Knowledge Deficit  Goal: Patient/family/caregiver demonstrates understanding of disease process, treatment plan, medications, and discharge instructions  Description  Complete learning assessment and assess knowledge base    Interventions:  - Provide teaching at level of understanding  - Provide teaching via preferred learning methods  Outcome: Progressing     Problem: Prexisting or High Potential for Compromised Skin Integrity  Goal: Skin integrity is maintained or improved  Description  INTERVENTIONS:  - Identify patients at risk for skin breakdown  - Assess and monitor skin integrity  - Assess and monitor nutrition and hydration status  - Monitor labs   - Assess for incontinence   - Turn and reposition patient  - Assist with mobility/ambulation  - Relieve pressure over bony prominences  - Avoid friction and shearing  - Provide appropriate hygiene as needed including keeping skin clean and dry  - Evaluate need for skin moisturizer/barrier cream  - Collaborate with interdisciplinary team   - Patient/family teaching  - Consider wound care consult   Outcome: Progressing

## 2020-03-12 NOTE — CONSULTS
Patient MRN: [id number]  Date of Service: 3/12/2020  Referring Provider: Dr Sree Sumner  Provider Creating Note: EUN Gallegos  PCP: No primary care provider on file  Reason for Consult: Anemia/thrombocytopenia/cirrhosis    HISTORY OF PRESENT ILLNESS:  Juanita Wasserman is a 75 y o  female who was admitted with c/o "worsening shortness of breath and cough, after developing a cold "  The patient has not seen a physician or received medical care for 20 years  Upon admission, the patient was noted to be anemic, in an acute exacerbation of CHF/respiratory failure, with bilateral pleural effusions, along with probable cirrhosis/thrombocytopenia  The patient denies any gastrointestinal complaints  Denies diarrhea or constipation  Denies hematochezia or melena  Denies abdominal pain  She does complain of bilateral lower extremity swelling, which has improved since her hospitalization  Denies weight loss  No changes in appetite  Denies fever or chills  Denies chest pain  +shortness of breath, with exertion, improved since admission  Review of Systems:  Normal except per HPI  General:   No fever or chills; No significant weight loss or gain  EENT:   No ear pain, facial swelling; No sneezing, sore throat  Skin:   No rashes, color changes  Respiratory:     No shortness of breath, cough, wheezing, stridor  Cardiovascular:     No chest pain, palpitations  Gastrointestinal:    As per HPI  Musculoskeletal:     No arthralgias, myalgias, swelling  Neurologic:   No dizziness, numbness, weakness  No speech difficulties  Psych:   No agitation, suicidal ideations    Otherwise, All other twelve-point review of systems normal      History reviewed  No pertinent past medical history    Past Surgical History:   Procedure Laterality Date    APPENDECTOMY      HYSTERECTOMY      TONSILLECTOMY       Allergies   Allergen Reactions    Sulfa Antibiotics Hallucinations     Medications:  Home Medications  Prior to Admission medications    Not on File     Inhouse Medications    Current Facility-Administered Medications:     aspirin chewable tablet 81 mg, 81 mg, Oral, Daily, 81 mg at 03/12/20 0806    benzonatate (TESSALON PERLES) capsule 100 mg, 100 mg, Oral, TID PRN, 100 mg at 03/11/20 1803    cyanocobalamin (VITAMIN B-12) tablet 1,000 mcg, 1,000 mcg, Oral, Daily, 1,000 mcg at 03/12/20 0807    dextromethorphan-guaiFENesin (ROBITUSSIN DM)  mg/5 mL oral syrup 10 mL, 10 mL, Oral, Q4H PRN, 10 mL at 73/48/44 5637    folic acid (FOLVITE) tablet 1 mg, 1 mg, Oral, Daily, 1 mg at 03/12/20 0807    ipratropium-albuterol (DUO-NEB) 0 5-2 5 mg/3 mL inhalation solution 3 mL, 3 mL, Nebulization, Q6H PRN, 3 mL at 03/11/20 0824    iron sucrose (VENOFER) 200 mg in sodium chloride 0 9 % 100 mL IVPB, 200 mg, Intravenous, Every Other Day, 200 mg at 03/11/20 1643    metoprolol succinate (TOPROL-XL) 24 hr tablet 12 5 mg, 12 5 mg, Oral, Daily, 12 5 mg at 03/12/20 0916    nitroglycerin (NITROSTAT) SL tablet 0 4 mg, 0 4 mg, Sublingual, Q5 Min PRN    nystatin (MYCOSTATIN) powder, , Topical, BID    potassium chloride (K-DUR,KLOR-CON) CR tablet 20 mEq, 20 mEq, Oral, Daily, 20 mEq at 03/12/20 5065    Social History   reports that she has quit smoking  She has never used smokeless tobacco  She reports that she drank about 1 0 standard drinks of alcohol per week  She reports that she does not use drugs  The patient reports 16 tattoos  Family History  History reviewed  No pertinent family history  OBJECTIVE:    /61 (BP Location: Left arm)   Pulse 80   Temp (!) 96 1 °F (35 6 °C) (Temporal)   Resp 18   Ht 5' 1 5" (1 562 m)   Wt 112 kg (246 lb 0 5 oz)   SpO2 96%   Breastfeeding No   BMI 45 73 kg/m²      Physical Exam   Constitutional: She is oriented to person, place, and time  She appears well-developed and well-nourished  Cardiovascular: Normal heart sounds  No murmurs/rubs/gallops    Pulmonary/Chest: Effort normal  She has rales [facility]) throughout  +decreased in the bases  Abdominal: Soft  Bowel sounds are normal  She exhibits no distension  There is no tenderness  +obese/soft  NT/ND  No rebound or guarding  No HSM  No mass or ascites  Musculoskeletal: Normal range of motion  She exhibits edema (+3pitting edema up to the knees)  Neurological: She is alert and oriented to person, place, and time  Skin: Skin is warm  Psychiatric: She has a normal mood and affect  Laboratory Studies:  Results from last 7 days   Lab Units 03/12/20  0451 03/11/20  0614 03/10/20  1555   WBC Thousand/uL 5 30 5 10 7 60   HEMOGLOBIN g/dL 8 4* 8 4* 9 0*   HEMATOCRIT % 26 7* 26 9* 28 7*   MCV fL 89 89 89   PLATELETS Thousands/uL 88* 84* 103*   INR   --  1 09  --      Results from last 7 days   Lab Units 03/12/20  0451 03/10/20  2151 03/10/20  1555   SODIUM mmol/L 139 139 140   POTASSIUM mmol/L 3 8 3 9 3 4*   CHLORIDE mmol/L 96* 102 102   CO2 mmol/L 37* 31* 30   BUN mg/dL 17 13 13   CREATININE mg/dL 1 27* 1 05 1 08   CALCIUM mg/dL 9 0 9 0 9 1   TOTAL BILIRUBIN mg/dL  --  1 50* 1 40*   ALK PHOS U/L  --  124* 127*   ALT U/L  --  34 30   AST U/L  --  40* 35     Imaging and Other Studies:  Xr Chest 1 View Portable    Result Date: 3/10/2020  Narrative: CHEST INDICATION:   cough x1 week, sob  COMPARISON:  None  Impression: Pleural-parenchymal density at the bases as well as perihilar infiltrate  Pneumonia is possible  Follow-up PA and lateral film is advised better assess the hilar region  The study was marked in [facility] for immediate notification  Workstation performed: [id number]     Ct Chest Wo Contrast  Result Date: 3/11/2020  Narrative: CT CHEST WITHOUT IV CONTRAST INDICATION:   Cough  COMPARISON:  None  Impression: Small right upper lobe groundglass alveolar infiltrate  Multilobar subsegmental atelectasis, left greater than right  Small to moderate layering bilateral pleural effusions, left greater than right  Cardiomegaly   No pericardial effusion  Equivocal nodular liver surface  Consider hepatic cirrhosis in the appropriate clinical context  Mild geraldine hepatic adenopathy may be sequela of chronic hepatocellular disease  Consider follow-up contrast enhanced CT of the abdomen and pelvis  Cholelithiasis  This study demonstrates a significant  finding and was documented as such in [facility] for liaison and referring practitioner notification  Workstation performed: [id number]     Us Liver  Result Date: 3/11/2020  Narrative: RIGHT UPPER QUADRANT ULTRASOUND INDICATION:     Suspected liver cirrhosis  Impression: Exam limited by patient body habitus and inability to cooperate with exam  Liver does appear mildly cirrhotic  Main portal vein not convincingly identified  Although a presumed main portal vein demonstrates normal direction of flow  Right pleural effusion is seen  Gallbladder is contracted around multiple gallstones, gallbladder wall is subsequently thickened likely due to the underdistention  No sonographic Luz Maria Jaret sign reported  Normal caliber common bile duct  Limited evaluation of right kidney which appeared grossly normal  Pancreatic tail obscured by overlying bowel gas  Previous chest CT report suggested consideration of contrast-enhanced CT of the abdomen and pelvis  Which is advisable if thorough evaluation is desired  The study was marked in [facility] for immediate notification  Workstation performed: [id number]     Vas Lower Limb Venous Duplex Study, Complete Bilateral  Result Date: 3/12/2020  Narrative:  [facility] REPORT CLINICAL: Indications: Patient presents with bilateral lower extremity pain and swelling  FINDINGS:  Segment  Right            Left              Impression       Impression       CFV      Normal (Patent)  Normal (Patent)     CONCLUSION: Impression: RIGHT LOWER LIMB: No evidence of acute or chronic deep vein thrombosis  No evidence of superficial thrombophlebitis noted   Doppler evaluation shows a normal response to augmentation maneuvers  Popliteal, posterior tibial and anterior tibial arterial Doppler waveforms are triphasic/biphasic  LEFT LOWER LIMB: No evidence for acute deep vein thrombosis in the common femoral vein and visualized portions of the posterior tibial veins  The peroneal veins were not well identified due to leg edema  No evidence of superficial thrombophlebitis noted  Doppler evaluation shows a normal response to augmentation maneuvers  Popliteal, posterior tibial and anterior tibial arterial Doppler waveforms are triphasic/biphasic  Tech Note: This study was technically difficult/limited due to left leg edema, making evaluation of the calf veins suboptimal   SIGNATURE: Electronically Signed by: Ondina Vance on 2020-03-12 07:58:17 AM    ASSESSMENT AND PLAN:  1  Normocytic anemia - H/H:  8 4/26 7; likely multifactorial, secondary to anemia of chronic disease (CHF/respiratory failure), nutritional deficiencies, and mild kidney disease  There is no evidence of active GI bleed  Stool for FOBT testing pending  The patient has no history of screening colonoscopy, and is currently declining endoscopic workup  In the event that the patient would be agreeable to endoscopic procedures, would need prioritize treatment for CHF/respiratory failure  2   Probable cirrhosis, with thrombocytopenia - recent US abdomen of liver limited  R/o secondary to fatty liver disease vs  other organic etiology  The patient reports no history of heavy ETOH use  Will discuss with Dr Campos Lujan possible CT abdomen/pevis with contrast   Cirrhosis w/up in progress, with YVETTE, SPEP, Hepatitis panel, HIV 1&2 pending  Anti-mitochondrial mariano ordered  3   Cholelithiasis, asymptomatic, with normal LFTs, elevated bilirubin  4   Iron deficiency anemia - IV iron as per hematology  5   Morbid obesity  6  Acute CHF - diuretics as per primary  7   Respiratory failure/bilateral pleural effusions - as per primary       Jorge Shania Shown

## 2020-03-12 NOTE — ASSESSMENT & PLAN NOTE
Wt Readings from Last 3 Encounters:   03/12/20 112 kg (246 lb 0 5 oz)   echo - normal ef 43%, normal systolic right sided function   She does have mild -moderate MR,TR and pulmonary hypertension   Lost from admission 9 lb , from yesterday had > 4 liters out of urine   Lungs more clear - slight decrease on bases - as also has atelectasis leg edema improved  to +2    With contraction alkalosis and mild increase in creatinine but not by 0 3 from admission , will hold further iv diuresis for today   reassess lisset for iv vs po diuresis   evaluated by cardiology as well  Continue asa   Will start toprol xl 12 5 mg po daily due to 10 beat run of vtach  Repeat cxr in am   Repeat probnp in am   Wean down oxygen   Keep on 1500 ml restriction fluid   Bmp in am   Check albumin as well now  k ok      Will check mag  Compression stockings  vde negative

## 2020-03-12 NOTE — PROGRESS NOTES
Progress Note - Cardiology   Lazaro Howe 75 y o  female MRN: [id number]  Unit/Bed#: 7T [facility] 703-02 Encounter: [id number]    Assessment:  1  Congestive heart failure, diastolic, improving  2  Nonsustained ventricular tachycardia   3  Non MI troponin elevation  4  Anemia and thrombocytopenia    Plan:  1  Continue diuresis  2  Begin metoprolol 25 mg b i d  To attempt to suppress nonsustained ventricular tachycardia       Interval history:  Patient has had significant improvement in her breathing and oxygenation  She is presently on O2 by nasal cannula but has been on room air on occasion  Her edema is much improved  Vitals: /79 (BP Location: Left arm)   Pulse 80   Temp (!) 96 1 °F (35 6 °C) (Temporal)   Resp 18   Ht 5' 1 5" (1 562 m)   Wt 112 kg (246 lb 0 5 oz)   SpO2 96%   Breastfeeding No   BMI 45 73 kg/m²   Vitals:    03/11/20 0600 03/12/20 0600   Weight: 114 kg (251 lb 5 2 oz) 112 kg (246 lb 0 5 oz)     Orthostatic Blood Pressures      Most Recent Value   Blood Pressure  136/79 filed at 03/12/2020 1029   Patient Position - Orthostatic VS  Sitting filed at 03/12/2020 1029            Intake/Output Summary (Last 24 hours) at 3/12/2020 1053  Last data filed at 3/12/2020 0840  Gross per 24 hour   Intake 1320 ml   Output 4100 ml   Net -2780 ml       Invasive Devices     Peripheral Intravenous Line            Peripheral IV 03/10/20 Right Antecubital 1 day                Review of Systems   Respiratory: Positive for shortness of breath  Negative for cough, choking, chest tightness and wheezing  Cardiovascular: Positive for leg swelling  Negative for chest pain and palpitations  Musculoskeletal: Negative for gait problem  Skin: Negative for rash  Neurological: Negative for dizziness, tremors, syncope, weakness, light-headedness, numbness and headaches  Psychiatric/Behavioral: Negative for agitation and behavioral problems  The patient is not hyperactive          Physical Exam Constitutional: She is oriented to person, place, and time  She appears well-developed and well-nourished  No distress  Morbidly obese, BMI 45 7   HENT:   Head: Normocephalic and atraumatic  Neck: No JVD present  No thyromegaly present  Cardiovascular: Normal rate, regular rhythm, normal heart sounds and intact distal pulses  Exam reveals no gallop and no friction rub  No murmur heard  Pulmonary/Chest: No respiratory distress  She has no wheezes  She has no rales  Musculoskeletal: She exhibits no edema  Neurological: She is alert and oriented to person, place, and time  Skin: Skin is warm and dry  Psychiatric: She has a normal mood and affect  Her behavior is normal        Lab Results:   CBC with diff:   Results from last 7 days   Lab Units 03/12/20  0451   WBC Thousand/uL 5 30   RBC Million/uL 3 01*   HEMOGLOBIN g/dL 8 4*   HEMATOCRIT % 26 7*   MCV fL 89   MCH pg 27 7   MCHC g/dL 31 3   RDW % 18 3*   MPV fL 9 3   PLATELETS Thousands/uL 88*     CMP:   Results from last 7 days   Lab Units 03/12/20  0451 03/10/20  2151   SODIUM mmol/L 139 139   POTASSIUM mmol/L 3 8 3 9   CHLORIDE mmol/L 96* 102   CO2 mmol/L 37* 31*   BUN mg/dL 17 13   CREATININE mg/dL 1 27* 1 05   CALCIUM mg/dL 9 0 9 0   AST U/L  --  40*   ALT U/L  --  34   ALK PHOS U/L  --  124*   EGFR ml/min/1 73sq m 41* 52*     Troponin:   0   Lab Value Date/Time    TROPONINI 0 99 (H) 03/11/2020 0927    TROPONINI 1 03 (H) 03/11/2020 0614    TROPONINI 0 83 (H) 03/11/2020 0302    TROPONINI 0 72 (H) 03/11/2020 0028    TROPONINI 0 60 (H) 03/10/2020 2151    TROPONINI 0 44 (H) 03/10/2020 1923    TROPONINI 0 31 (H) 03/10/2020 1555     TSH:   Results from last 7 days   Lab Units 03/10/20  2151   TSH 3RD GENERATON uIU/mL 2 020     Magnesium:   Results from last 7 days   Lab Units 03/12/20  0451   MAGNESIUM mg/dL 1 9       Imaging: I have personally reviewed pertinent reports        EKG:  Sinus rhythm with a 10 beat run of ventricular tachycardia overnight

## 2020-03-12 NOTE — PHYSICAL THERAPY NOTE
31 minute treatment       03/12/20 8175   Pain Assessment   Pain Assessment Tool 0-10   Restrictions/Precautions   Other Precautions Fall Risk;Telemetry;O2   General   Chart Reviewed Yes   Response to Previous Treatment Patient with no complaints from previous session  Family/Caregiver Present No   Cognition   Overall Cognitive Status WFL   Following Commands Follows all commands and directions without difficulty   Subjective   Subjective Pt reports pain from JOSÉ LUIS stocking   Bed Mobility   Supine to Sit 5  Supervision   Sit to Supine 5  Supervision   Transfers   Sit to Stand 5  Supervision   Additional items Increased time required   Stand to Sit 5  Supervision   Additional items Increased time required   Stand pivot   (CG w/ SPC)   Ambulation/Elevation   Gait pattern Decreased foot clearance; Improper Weight shift; Excessively slow   Gait Assistance 4  Minimal assist   Additional items Assist x 1   Assistive Device [facility]   Distance 25' x 2   Balance   Ambulatory Fair   Endurance Deficit   Endurance Deficit Yes   Activity Tolerance   Activity Tolerance Patient limited by fatigue   Assessment   Prognosis Fair   Problem List Decreased endurance; Impaired balance;Decreased mobility;Obesity   Assessment Pt seen for PT treatemtn session  Pts gait is slightly unsteady w/ SPC  Pt reports pain w/ amb 2* the JOSÉ LUIS stockings being too tight  Nursing notified and aware of pts reports of TEDS stocking discomfort  Pt did well w/ bed mobility  SOB present w/ amb O2 sats 94%     Goals   Patient Goals none stated   STG Expiration Date 03/21/20   PT Treatment Day 2   Plan   Treatment/Interventions   (Continue per plan of care)   Progress Progressing toward goals   PT Frequency   (3-5x/week)   Malia Jacobson, PTA

## 2020-03-12 NOTE — ASSESSMENT & PLAN NOTE
· Secondary to new onset heart failure exacerbation - acute diastolic heart failure- normal ef- she does have pulmonary hypertension   improving- with diuresis   Will star ttitrating down the oxygen

## 2020-03-12 NOTE — ASSESSMENT & PLAN NOTE
· Patient has not seen a physician in 20 years  Her hemoglobin is 9  Will check anemia profile vitamin B12 - low for her age <400 start oral suppl /folic acid  Will check Hemoccult- patient reports no melena or hematochezia- still pending       · Iron studies -> iron deficiency   · Evaluated by heme onc as well  · venofer for 2 doses given   · Hb stable as today monitor as on aspirin   · retic >2-ldh negative   · Will follow up with heme onc as outpatient   · Also asked gi to see

## 2020-03-12 NOTE — PLAN OF CARE
Problem: OCCUPATIONAL THERAPY ADULT  Goal: Performs self-care activities at highest level of function for planned discharge setting  See evaluation for individualized goals  Description  Treatment Interventions: ADL retraining, Functional transfer training, UE strengthening/ROM, Endurance training, Continued evaluation, Activityengagement          See flowsheet documentation for full assessment, interventions and recommendations  Note:   Limitation: Decreased ADL status, Decreased UE strength, Decreased Safe judgement during ADL, Decreased cognition, Decreased endurance, Decreased high-level ADLs     Assessment: Pt is a 75 y o  female seen for OT evaluation s/p admit to [facility] on 3/10/[facility] w/ Acute respiratory failure with hypoxia ([facility] [address] )  Comorbidities affecting pt's functional performance at time of assessment include: heart failure, anemia, hypoxia, elevated troponins    Personal factors affecting pt at time of IE include:limited home support, difficulty performing ADLS, difficulty performing IADLS , health management  and environment  Upon evaluation: Pt requires SBA-CGA for ambulation, Carl for LB ADLs  The following deficits impact occupational performance: weakness, decreased strength, decreased balance, decreased tolerance, impaired problem solving and decreased safety awareness  Pt to benefit from continued skilled OT services while in the hospital to address deficits as defined above and maximize level of functional independence w ADL's and functional mobility  Occupational performance areas to address include: grooming, bathing/shower, toilet hygiene, health maintenance, functional mobility and clothing management  From OT standpoint, recommendation at time of d/c would be home vs rehab, pending progress during acute stay         OT Discharge Recommendation: Other (Comment)(home vs rehab pending progress )

## 2020-03-12 NOTE — PLAN OF CARE
Problem: PHYSICAL THERAPY ADULT  Goal: Performs mobility at highest level of function for planned discharge setting  See evaluation for individualized goals  Description  Treatment/Interventions: Functional transfer training, LE strengthening/ROM, Elevations, Therapeutic exercise, Endurance training, Patient/family training, Equipment eval/education, Bed mobility, Gait training, Spoke to nursing, Spoke to case management  Equipment Recommended: [place]       See flowsheet documentation for full assessment, interventions and recommendations  Outcome: Progressing  Note:   Prognosis: Fair  Problem List: Decreased endurance, Impaired balance, Decreased mobility, Obesity  Assessment: Pt seen for PT treatemtn session  Pts gait is slightly unsteady w/ SPC  Pt reports pain w/ amb 2* the JOSÉ LUIS stockings being too tight  Nursing notified and aware of pts reports of TEDS stocking discomfort  Pt did well w/ bed mobility  SOB present w/ amb O2 sats 94%  Barriers to Discharge: Inaccessible home environment     Recommendation: (home vs rehab pending progress)     PT - OK to Discharge: No(pt will need stair training prior to D/C; may need rehab)    See flowsheet documentation for full assessment

## 2020-03-12 NOTE — ASSESSMENT & PLAN NOTE
· 10 beat run overnight asymptomatic  · k is normal and being supplemented  · Will check mag  · Echo ef normal   · Will have cardiology  Re-eval  · Will start toprol xl 12 5 mg po daily   · Renew telemetry monitor

## 2020-03-12 NOTE — PROGRESS NOTES
Progress Note - Willy Chirinos 1945, 75 y o  female MRN: [id number]    Unit/Bed#: 7T [facility] 703-02 Encounter: [id number]    Primary Care Provider: No primary care provider on file  Date and time admitted to hospital: 3/10/2020  3:41 PM        Ventricular tachycardia (HCC)  Assessment & Plan  · 10 beat run overnight asymptomatic  · k is normal and being supplemented  · Will check mag  · Echo ef normal   · Will have cardiology  Re-eval  · Will start toprol xl 12 5 mg po daily   · Renew telemetry monitor     Cholelithiasis  Assessment & Plan  · Asymptomatic - no need for any surgical intervention     Cirrhosis of liver without ascites ([facility] [address] )  Assessment & Plan  · Evidenced by liver countor on us- with thrombocytopenia   · Unknown etiology   · Does not drink alcohol no drugs  · Has not seen physician in 20 years   · inr normal   · Chronic hep panel pending   · ?cardiac cirrhosis   · Has been diuresed currently on hold today with slight cr elevation and contraction alkalosis   · Will GI eval as well on opinion of etiology /recs  · Will check albumin     Vitamin B12 deficiency  Assessment & Plan  · Level at her age should be >400 , jwill start/continue  oral 1000mcg po daily     Thrombocytopenia ([facility] [address] )  Assessment & Plan  · Has not seen a physician in 20 years  · Evaluated by heme onc- most likley chronic process  · Off lovenox since 3/11 as plt stable today   · Further work up ordered by heme on c  · Liver us reviewed looks like has liver cirrhosis - suspect thrombocytopenia 2/2 liver disease  · No bleeding   · Hep panel and hiv pending  · Will follow outpatient with heme onc  · Vitamin b12 supplemented     Acute anemia  Assessment & Plan  · Patient has not seen a physician in 20 years  Her hemoglobin is 9  Will check anemia profile vitamin B12 - low for her age <400 start oral suppl /folic acid  Will check Hemoccult- patient reports no melena or hematochezia- still pending       · Iron studies -> iron deficiency   · Evaluated by heme onc as well  · venofer for 2 doses given   · Hb stable as today monitor as on aspirin   · retic >2-ldh negative   · Will follow up with heme onc as outpatient   · Also asked gi to see     Troponin I above reference range  Assessment & Plan  · Non MI elevation->     Acute diastolic heart failure ([address] )  Assessment & Plan  Wt Readings from Last 3 Encounters:   03/12/20 112 kg (246 lb 0 5 oz)   echo - normal ef 29%, normal systolic right sided function   She does have mild -moderate MR,TR and pulmonary hypertension   Lost from admission 9 lb , from yesterday had > 4 liters out of urine   Lungs more clear - slight decrease on bases - as also has atelectasis leg edema improved  to +2    With contraction alkalosis and mild increase in creatinine but not by 0 3 from admission , will hold further iv diuresis for today   reassess lisset for iv vs po diuresis   evaluated by cardiology as well  Continue asa   Will start toprol xl 12 5 mg po daily due to 10 beat run of vtach  Repeat cxr in am   Repeat probnp in am   Wean down oxygen   Keep on 1500 ml restriction fluid   Bmp in am   Check albumin as well now  k ok   Will check mag  Compression stockings  vde negative       * Acute respiratory failure with hypoxia (HCC)  Assessment & Plan  · Secondary to new onset heart failure exacerbation - acute diastolic heart failure- normal ef- she does have pulmonary hypertension   improving- with diuresis  Will star ttitrating down the oxygen       VTE Pharmacologic Prophylaxis:   Pharmacologic: Pharmacologic VTE Prophylaxis contraindicated due to thrombocytopenia   Mechanical VTE Prophylaxis in Place: Yes    Patient Centered Rounds: I have performed bedside rounds with nursing staff today  Discussions with Specialists or Other Care Team Provider: discussed with     Education and Discussions with Family / Patient: patient and also spoke to family yesterday     Time Spent for Care: 30 minutes    More than 50% of total time spent on counseling and coordination of care as described above  Current Length of Stay: 2 day(s)    Current Patient Status: Inpatient   Certification Statement: The patient will continue to require additional inpatient hospital stay due to chf /renal failure    Discharge Plan: pending progress   Code Status: Level 3 - DNAR and DNI      Subjective:   Patient seen and examined- still cough but better dry, sob improved  No dizziness or chest pain     Objective:     Vitals:   Temp (24hrs), Av 1 °F (36 2 °C), Min:96 1 °F (35 6 °C), Max:97 6 °F (36 4 °C)    Temp:  [96 1 °F (35 6 °C)-97 6 °F (36 4 °C)] 96 1 °F (35 6 °C)  HR:  [80-91] 80  Resp:  [18-22] 18  BP: (113-141)/(61-87) 113/61  SpO2:  [96 %-98 %] 96 %  Body mass index is 45 73 kg/m²  Input and Output Summary (last 24 hours): Intake/Output Summary (Last 24 hours) at 3/12/2020 0916  Last data filed at 3/12/2020 0840  Gross per 24 hour   Intake 1320 ml   Output 4100 ml   Net -2780 ml       Physical Exam:     Physical Exam   Constitutional: She is oriented to person, place, and time  obese   HENT:   Head: Normocephalic and atraumatic  Eyes: Pupils are equal, round, and reactive to light  EOM are normal    Neck: Normal range of motion  Cardiovascular: Normal rate, regular rhythm and normal heart sounds  Pulmonary/Chest: Effort normal    Due to body habitus difficult to asses - but do not hear any crackles - decrease at bases    Abdominal: Soft  Bowel sounds are normal  She exhibits no distension  There is no tenderness  There is no guarding  Musculoskeletal: Normal range of motion  She exhibits edema (+2 pitting edema upto shin )  Neurological: She is alert and oriented to person, place, and time  She has normal reflexes  cranial nerve 2-12 are normal   Normal neurological exam   Skin: Skin is warm  Psychiatric: She has a normal mood and affect           Additional Data:     Labs:    Results from last 7 days   Lab Units 03/12/20  0451   WBC Thousand/uL 5 30   HEMOGLOBIN g/dL 8 4*   HEMATOCRIT % 26 7*   PLATELETS Thousands/uL 88*   NEUTROS PCT % 68*   LYMPHS PCT % 21*   MONOS PCT % 8   EOS PCT % 2     Results from last 7 days   Lab Units 03/12/20  0451 03/10/20  2151   SODIUM mmol/L 139 139   POTASSIUM mmol/L 3 8 3 9   CHLORIDE mmol/L 96* 102   CO2 mmol/L 37* 31*   BUN mg/dL 17 13   CREATININE mg/dL 1 27* 1 05   ANION GAP mmol/L 6 6   CALCIUM mg/dL 9 0 9 0   ALBUMIN g/dL  --  3 1   TOTAL BILIRUBIN mg/dL  --  1 50*   ALK PHOS U/L  --  124*   ALT U/L  --  34   AST U/L  --  40*   GLUCOSE RANDOM mg/dL 105* 106*     Results from last 7 days   Lab Units 03/11/20  0614   INR  1 09             Results from last 7 days   Lab Units 03/11/20  0614   PROCALCITONIN ng/ml <0 05           * I Have Reviewed All Lab Data Listed Above  * Additional Pertinent Lab Tests Reviewed: All Labs Within Last 24 Hours Reviewed    Imaging:    Imaging Reports Reviewed Today Include: none today   Imaging Personally Reviewed by Myself Includes:      Recent Cultures (last 7 days):     Results from last 7 days   Lab Units 03/10/20  1555   BLOOD CULTURE  No Growth at 24 hrs  No Growth at 24 hrs         Last 24 Hours Medication List:     Current Facility-Administered Medications:  aspirin 81 mg Oral Daily Urbano Corbin MD    benzonatate 100 mg Oral TID PRN Urbano Coribn MD    bisacodyl 10 mg Oral Once Urbano Corbin MD    cyanocobalamin 1,000 mcg Oral Daily Urbano Corbin MD    dextromethorphan-guaiFENesin 10 mL Oral Q4H PRN Urbano Corbin MD    folic acid 1 mg Oral Daily EUN Higgins    ipratropium-albuterol 3 mL Nebulization Q6H PRN EUN Sullivan    iron sucrose 200 mg Intravenous Every Other Day EUN Higgins Last Rate: 200 mg (03/11/20 1643)   metoprolol succinate 12 5 mg Oral Daily Urbano Corbin MD    nitroglycerin 0 4 mg Sublingual Q5 Min PRN Urbano Corbin MD    nystatin  Topical BID Urbano Corbin MD    potassium chloride 20 mEq Oral Daily Ger Spence MD         Today, Patient Was Seen By: Ger Spence MD    ** Please Note: Dictation voice to text software may have been used in the creation of this document   **

## 2020-03-12 NOTE — ASSESSMENT & PLAN NOTE
· Has not seen a physician in 20 years      · Evaluated by heme onc- most likley chronic process  · Off lovenox since 3/11 as plt stable today   · Further work up ordered by [facility] on c  · Liver us reviewed looks like has liver cirrhosis - suspect thrombocytopenia 2/2 liver disease  · No bleeding   · Hep panel and hiv pending  · Will follow outpatient with heme onc  · Vitamin b12 supplemented

## 2020-03-12 NOTE — ASSESSMENT & PLAN NOTE
· Evidenced by liver countor on us- with thrombocytopenia   · Unknown etiology   · Does not drink alcohol no drugs  · Has not seen physician in 20 years   · inr normal   · Chronic hep panel pending   · ?cardiac cirrhosis   · Has been diuresed currently on hold today with slight cr elevation and contraction alkalosis   · Will GI eval as well on opinion of etiology /recs  · Will check albumin

## 2020-03-13 NOTE — NURSING NOTE
AOX3 HR regular trace edema B/L U E and + 3 B L LE Lungs decreased with exp wheezes moist non productive cough + Bowel sounds x4 + PP ABD soft large  Rash R breast   Denies any pain  Will continue to monitor call bell within reach

## 2020-03-13 NOTE — ASSESSMENT & PLAN NOTE
· Secondary to new onset heart failure exacerbation - acute diastolic heart failure- normal ef- she does have pulmonary hypertension   improving- with diuresis   Will continue ttitrating down the oxygen

## 2020-03-13 NOTE — ASSESSMENT & PLAN NOTE
· No recurrence of V-tach  But decreased metoprolol tartrate 12 5 mg p o  B i d  As due to marginal blood pressure the dose was not given last night  Discontinue telemetry  Discussed cardiology

## 2020-03-13 NOTE — ASSESSMENT & PLAN NOTE
· Evidenced by liver countor on us- with thrombocytopenia   · Unknown etiology   · Does not drink alcohol no drugs  · Has not seen physician in 20 years   · inr normal   · Chronic hep panel pending   · ?cardiac cirrhosis   · Has been diuresed currently on hold today with slight cr elevation and contraction alkalosis   · Will GI eval as well on opinion of etiology /recs- will have to follow-up as an outpatient    · Will check albumin -low -

## 2020-03-13 NOTE — OCCUPATIONAL THERAPY NOTE
Occupational Therapy Treatment Note     Patient Name: Kurtis Hutchinson  Today's Date: 3/13/2020  Problem List  Principal Problem:    Acute respiratory failure with hypoxia [facility])  Active Problems:    Acute diastolic heart failure (HCC)    Troponin I above reference range    Acute anemia    Thrombocytopenia (HCC)    Vitamin B12 deficiency    Cirrhosis of liver without ascites ([facility] [address] )    Cholelithiasis    Ventricular tachycardia ([facility] [address] )            03/13/20 4694-4282 (43 minutes)   Restrictions/Precautions   Weight Bearing Precautions Per Order No   Other Precautions Fall Risk;O2;Telemetry   Pain Assessment   Pain Assessment Tool Pain Assessment not indicated - pt denies pain   ADL   LB Dressing Assistance 3  Moderate Assistance   LB Dressing Deficit Thread RLE into underwear; Thread LLE into underwear;Pull up over hips   Toileting Assistance  2  Maximal Assistance   Toileting Deficit Clothing management up;Clothing management down;Perineal hygiene   Functional Standing Tolerance   Time ~ 4 minutes    Bed Mobility   Additional Comments Pt received at [address] [place] [address]  Transfers   Sit to Stand   (CGA)   Stand to Sit   (CGA)   Functional Mobility   Functional Mobility 4  Minimal assistance   Additional Comments x 1, household distances    Cognition   Arousal/Participation Cooperative; Alert   Attention Within functional limits   Orientation Level Oriented X4   Memory Within functional limits   Following Commands Follows all commands and directions without difficulty   Comments Pt demonstrates limited insight into deficits  Activity Tolerance   Activity Tolerance Patient tolerated treatment well   Assessment   Assessment Pt seen for OT txt  Pt on 2L O2, desaturation to 80% with brief ambulation, increased to 3L with recovery to 97% and seated rest break  Pt required maxA to don and manage brief, noted incontinence of urine  Pt requires Carl for ambulation with a SPC as well as assist for tubing management    Pt demonstrated overall deconditioning, decreased pulmonary status, and poor endurance  Pt would benefit from continued OT services to address deficits, recommend discharge to short term rehab once medically stable  Plan   Treatment Interventions ADL retraining;Functional transfer training;UE strengthening/ROM; Endurance training;Continued evaluation; Activityengagement   Goal Expiration Date 03/18/20   OT Treatment Day 1   OT Frequency 2-3x/wk   Recommendation   OT Discharge Recommendation Short Term Rehab

## 2020-03-13 NOTE — ASSESSMENT & PLAN NOTE
· Has not seen a physician in 20 years      · Evaluated by heme onc- most likley chronic process  · Off lovenox since 3/11 as plt stable today   · Further work up ordered by heme on c  · Liver us reviewed looks like has liver cirrhosis - suspect thrombocytopenia 2/2 liver disease  · No bleeding   · Hep panel -negand hiv pending  · Will follow outpatient with heme onc  · Vitamin b12 supplemented

## 2020-03-13 NOTE — PHYSICAL THERAPY NOTE
24 minute treatment       03/13/20 1475   Pain Assessment   Pain Assessment Tool 0-10   Pain Score No Pain   Restrictions/Precautions   Other Precautions Fall Risk;O2;Telemetry   General   Chart Reviewed Yes   Response to Previous Treatment Patient with no complaints from previous session  Family/Caregiver Present No   Cognition   Overall Cognitive Status WFL   Following Commands Follows all commands and directions without difficulty   Subjective   Subjective Pt reports being tired   Transfers   Sit to Stand 5  Supervision   Additional items Increased time required   Stand to Sit 5  Supervision   Additional items Increased time required   Stand pivot 5  Supervision  (CG w  SPC)   Ambulation/Elevation   Gait pattern Decreased foot clearance; Improper Weight shift; Short stride   Gait Assistance 4  Minimal assist   Additional items Assist x 1   Assistive Device SPC  (w/ wide base)   Distance 60' x 2  (O2 sats 90% after amb on 2L of o2)   Stair Management Assistance Not tested   Balance   Ambulatory Fair   Endurance Deficit   Endurance Deficit Yes   Endurance Deficit Description   (SOB)   Activity Tolerance   Activity Tolerance Patient limited by fatigue   Nurse Made Aware Nurse OK'd pt for PT   Exercises   Hip Flexion Sitting;20 reps;AROM; Bilateral;10 reps   Knee AROM Long Arc Quad Sitting;20 reps;10 reps   Ankle Pumps Sitting;10 reps;Bilateral   Assessment   Prognosis Fair   Problem List Decreased endurance; Impaired balance;Decreased mobility;Obesity   Assessment Pt seen at bedside for PT edward engel  Pt agreeable toPT  Pts gait is slighly unsteady  No LOB  Pt fatigues w/ amb  O2 sats 90% after amb 60'  Pt does need to perform ~ 14 steps for bed and bathroom at home  Pt declined trial of RW for amb  Pt and her dtr reports that pt holds onto walls and furniture at home       Goals   Patient Goals none stated   STG Expiration Date 03/31/20   PT Treatment Day 3   Plan   Treatment/Interventions   (COntiue per plan of care)   Progress Progressing toward goals   PT Frequency   (3-5x/week)   Recommendation   Recommendation Short-term skilled PT   Chad Nowak, PTA

## 2020-03-13 NOTE — ASSESSMENT & PLAN NOTE
· Patient has not seen a physician in 20 years  Her hemoglobin is 9  Will check anemia profile vitamin B12 - low for her age <400 start oral suppl /folic acid  Will check Hemoccult- patient reports no melena or hematochezia- still pending    · Iron studies -> iron deficiency   · Evaluated by heme onc as well  · venofer for 2 doses given   · Hb stable as today monitor as on aspirin   · retic >2-ldh negative   · Will follow up with heme onc as outpatient   · Also asked gi to see - evaluated by GI  Still Hemoccult have not been done hemoglobin has been stable she declined any endoscopic evaluation  · RF is elevated YVETTE is still pending I will get an anti CCP

## 2020-03-13 NOTE — ASSESSMENT & PLAN NOTE
Wt Readings from Last 3 Encounters:   03/13/20 110 kg (243 lb 6 2 oz)   echo - normal ef 55%, normal systolic right sided function   She does have mild -moderate MR,TR and pulmonary hypertension   Lost from admission 9 lb and from yesterday 3 more lb , she has been off diuresis since yesterday secondary to over diuresis with contraction alkalosis with mild increase in creatinine is still losing weight with adequate urine output with continue holding off reassess tomorrow to restart p o    Lungs more clear - slight decrease on bases with minimal crackles - as also has atelectasis leg edema improved  to + 1    With contraction alkalosis and mild increase in creatinine but not by 0 3 from admission , will hold further iv diuresis for today   reassess lisset for iv vs po diuresis   evaluated by cardiology as well  Continue asa   Repeat cxr in am still pending  Repeat probnp in am still elevated   Wean down oxygen   Keep on 1500 ml restriction fluid   Bmp in am   Check albumin as well now  k ok      Will check mag  Compression stockings  vde negative

## 2020-03-13 NOTE — PLAN OF CARE
Problem: Potential for Falls  Goal: Patient will remain free of falls  Description  INTERVENTIONS:  - Assess patient frequently for physical needs  -  Identify cognitive and physical deficits and behaviors that affect risk of falls    -  [place] fall precautions as indicated by assessment   - Educate patient/family on patient safety including physical limitations  - Instruct patient to call for assistance with activity based on assessment  - Modify environment to reduce risk of injury  - Consider OT/PT consult to assist with strengthening/mobility  3/13/2020 0745 by Tani Celestin RN  Outcome: Progressing  3/13/2020 0741 by Tani Celestin RN  Outcome: Progressing     Problem: PAIN - ADULT  Goal: Verbalizes/displays adequate comfort level or baseline comfort level  Description  Interventions:  - Encourage patient to monitor pain and request assistance  - Assess pain using appropriate pain scale  - Administer analgesics based on type and severity of pain and evaluate response  - Implement non-pharmacological measures as appropriate and evaluate response  - Consider cultural and social influences on pain and pain management  - Notify physician/advanced practitioner if interventions unsuccessful or patient reports new pain  3/13/2020 0745 by Tani Celestin RN  Outcome: Progressing  3/13/2020 0741 by Tani Celestin RN  Outcome: Progressing     Problem: DISCHARGE PLANNING  Goal: Discharge to home or other facility with appropriate resources  Description  INTERVENTIONS:  - Identify barriers to discharge w/patient and caregiver  - Arrange for needed discharge resources and transportation as appropriate  - Identify discharge learning needs (meds, wound care, etc )  - Arrange for interpretive services to assist at discharge as needed  - Refer to Case Management Department for coordinating discharge planning if the patient needs post-hospital services based on physician/advanced practitioner order or complex needs related to functional status, cognitive ability, or social support system  3/13/2020 0745 by Christie Parks RN  Outcome: Progressing  3/13/2020 0741 by Christie Parks RN  Outcome: Progressing     Problem: Knowledge Deficit  Goal: Patient/family/caregiver demonstrates understanding of disease process, treatment plan, medications, and discharge instructions  Description  Complete learning assessment and assess knowledge base    Interventions:  - Provide teaching at level of understanding  - Provide teaching via preferred learning methods  3/13/2020 0745 by Christie Parks RN  Outcome: Progressing  3/13/2020 0741 by Christie Parks RN  Outcome: Progressing     Problem: Prexisting or High Potential for Compromised Skin Integrity  Goal: Skin integrity is maintained or improved  Description  INTERVENTIONS:  - Identify patients at risk for skin breakdown  - Assess and monitor skin integrity  - Assess and monitor nutrition and hydration status  - Monitor labs   - Assess for incontinence   - Turn and reposition patient  - Assist with mobility/ambulation  - Relieve pressure over bony prominences  - Avoid friction and shearing  - Provide appropriate hygiene as needed including keeping skin clean and dry  - Evaluate need for skin moisturizer/barrier cream  - Collaborate with interdisciplinary team   - Patient/family teaching  - Consider wound care consult   3/13/2020 0745 by Christie Parks RN  Outcome: Progressing  3/13/2020 0741 by Christie Parks RN  Outcome: Progressing

## 2020-03-13 NOTE — PLAN OF CARE
Problem: OCCUPATIONAL THERAPY ADULT  Goal: Performs self-care activities at highest level of function for planned discharge setting  See evaluation for individualized goals  Description  Treatment Interventions: ADL retraining, Functional transfer training, UE strengthening/ROM, Endurance training, Continued evaluation, Activityengagement          See flowsheet documentation for full assessment, interventions and recommendations  Outcome: Progressing  Note:   Limitation: Decreased ADL status, Decreased UE strength, Decreased Safe judgement during ADL, Decreased cognition, Decreased endurance, Decreased high-level ADLs     Assessment: Pt seen for OT txt  Pt on 2L O2, desaturation to 80% with brief ambulation, increased to 3L with recovery to 97% and seated rest break  Pt required maxA to don and manage brief, noted incontinence of urine  Pt requires Carl for ambulation with a SPC as well as assist for tubing management  Pt demonstrated overall deconditioning, decreased pulmonary status, and poor endurance  Pt would benefit from continued OT services to address deficits, recommend discharge to short term rehab once medically stable        OT Discharge Recommendation: Short Term Rehab

## 2020-03-13 NOTE — PLAN OF CARE
Problem: PHYSICAL THERAPY ADULT  Goal: Performs mobility at highest level of function for planned discharge setting  See evaluation for individualized goals  Description  Treatment/Interventions: Functional transfer training, LE strengthening/ROM, Elevations, Therapeutic exercise, Endurance training, Patient/family training, Equipment eval/education, Bed mobility, Gait training, Spoke to nursing, Spoke to case management  Equipment Recommended: Joshua Stockton       See flowsheet documentation for full assessment, interventions and recommendations  Outcome: Progressing  Note:   Prognosis: Fair  Problem List: Decreased endurance, Impaired balance, Decreased mobility, Obesity  Assessment: Pt seen at bedside for PT edward engel  Pt agreeable toPT  Pts gait is slighly unsteady  No LOB  Pt fatigues w/ amb  O2 sats 90% after amb 60'  Pt does need to perform ~ 14 steps for bed and bathroom at home  Pt declined trial of RW for amb  Pt and her dtr reports that pt holds onto walls and furniture at home  Barriers to Discharge: Inaccessible home environment     Recommendation: Short-term skilled PT     PT - OK to Discharge: No(pt will need stair training prior to D/C; may need rehab)    See flowsheet documentation for full assessment

## 2020-03-13 NOTE — PLAN OF CARE
Problem: Potential for Falls  Goal: Patient will remain free of falls  Description  INTERVENTIONS:  - Assess patient frequently for physical needs  -  Identify cognitive and physical deficits and behaviors that affect risk of falls    -  Powell fall precautions as indicated by assessment   - Educate patient/family on patient safety including physical limitations  - Instruct patient to call for assistance with activity based on assessment  - Modify environment to reduce risk of injury  - Consider OT/PT consult to assist with strengthening/mobility  Outcome: Progressing     Problem: PAIN - ADULT  Goal: Verbalizes/displays adequate comfort level or baseline comfort level  Description  Interventions:  - Encourage patient to monitor pain and request assistance  - Assess pain using appropriate pain scale  - Administer analgesics based on type and severity of pain and evaluate response  - Implement non-pharmacological measures as appropriate and evaluate response  - Consider cultural and social influences on pain and pain management  - Notify physician/advanced practitioner if interventions unsuccessful or patient reports new pain  Outcome: Progressing     Problem: DISCHARGE PLANNING  Goal: Discharge to home or other facility with appropriate resources  Description  INTERVENTIONS:  - Identify barriers to discharge w/patient and caregiver  - Arrange for needed discharge resources and transportation as appropriate  - Identify discharge learning needs (meds, wound care, etc )  - Arrange for interpretive services to assist at discharge as needed  - Refer to Case Management Department for coordinating discharge planning if the patient needs post-hospital services based on physician/advanced practitioner order or complex needs related to functional status, cognitive ability, or social support system  Outcome: Progressing     Problem: Knowledge Deficit  Goal: Patient/family/caregiver demonstrates understanding of disease process, treatment plan, medications, and discharge instructions  Description  Complete learning assessment and assess knowledge base    Interventions:  - Provide teaching at level of understanding  - Provide teaching via preferred learning methods  Outcome: Progressing     Problem: Prexisting or High Potential for Compromised Skin Integrity  Goal: Skin integrity is maintained or improved  Description  INTERVENTIONS:  - Identify patients at risk for skin breakdown  - Assess and monitor skin integrity  - Assess and monitor nutrition and hydration status  - Monitor labs   - Assess for incontinence   - Turn and reposition patient  - Assist with mobility/ambulation  - Relieve pressure over bony prominences  - Avoid friction and shearing  - Provide appropriate hygiene as needed including keeping skin clean and dry  - Evaluate need for skin moisturizer/barrier cream  - Collaborate with interdisciplinary team   - Patient/family teaching  - Consider wound care consult   Outcome: Progressing

## 2020-03-13 NOTE — PROGRESS NOTES
Progress Note - Ronni Manzano 1945, 75 y o  female MRN: [id number]    Unit/Bed#: 7T [facility] 703-02 Encounter: [id number]    Primary Care Provider: No primary care provider on file  Date and time admitted to hospital: 3/10/2020  3:41 PM        Ventricular tachycardia ([facility] [address] )  Assessment & Plan  · No recurrence of V-tach  But decreased metoprolol tartrate 12 5 mg p o  B i d  As due to marginal blood pressure the dose was not given last night  Discontinue telemetry  Discussed cardiology  Cholelithiasis  Assessment & Plan  · Asymptomatic - no need for any surgical intervention     Cirrhosis of liver without ascites ([facility] [address] )  Assessment & Plan  · Evidenced by liver countor on us- with thrombocytopenia   · Unknown etiology   · Does not drink alcohol no drugs  · Has not seen physician in 20 years   · inr normal   · Chronic hep panel pending   · ?cardiac cirrhosis   · Has been diuresed currently on hold today with slight cr elevation and contraction alkalosis   · Will GI eval as well on opinion of etiology /recs- will have to follow-up as an outpatient  · Will check albumin -low -    Vitamin B12 deficiency  Assessment & Plan  · Level at her age should be >400 , jwill start/continue  oral 1000mcg po daily     Thrombocytopenia ([facility] [address] )  Assessment & Plan  · Has not seen a physician in 20 years  · Evaluated by heme onc- most likley chronic process  · Off lovenox since 3/11 as plt stable today   · Further work up ordered by heme on c  · Liver us reviewed looks like has liver cirrhosis - suspect thrombocytopenia 2/2 liver disease  · No bleeding   · Hep panel -negand hiv pending  · Will follow outpatient with heme onc  · Vitamin b12 supplemented     Acute anemia  Assessment & Plan  · Patient has not seen a physician in 20 years  Her hemoglobin is 9  Will check anemia profile vitamin B12 - low for her age <400 start oral suppl /folic acid    Will check Hemoccult- patient reports no melena or hematochezia- still pending    · Iron studies -> iron deficiency   · Evaluated by heme onc as well  · venofer for 2 doses given   · Hb stable as today monitor as on aspirin   · retic >2-ldh negative   · Will follow up with heme onc as outpatient   · Also asked gi to see - evaluated by GI  Still Hemoccult have not been done hemoglobin has been stable she declined any endoscopic evaluation  · RF is elevated YVETTE is still pending I will get an anti CCP  Troponin I above reference range  Assessment & Plan  · Non MI elevation->     Acute diastolic heart failure (HCC)  Assessment & Plan  Wt Readings from Last 3 Encounters:   03/13/20 110 kg (243 lb 6 2 oz)   echo - normal ef 52%, normal systolic right sided function   She does have mild -moderate MR,TR and pulmonary hypertension   Lost from admission 9 lb and from yesterday 3 more lb , she has been off diuresis since yesterday secondary to over diuresis with contraction alkalosis with mild increase in creatinine is still losing weight with adequate urine output with continue holding off reassess tomorrow to restart p o    Lungs more clear - slight decrease on bases with minimal crackles - as also has atelectasis leg edema improved  to + 1    With contraction alkalosis and mild increase in creatinine but not by 0 3 from admission , will hold further iv diuresis for today   reassess lisset for iv vs po diuresis   evaluated by cardiology as well  Continue asa   Repeat cxr in am still pending  Repeat probnp in am still elevated   Wean down oxygen   Keep on 1500 ml restriction fluid   Bmp in am   Check albumin as well now  k ok   Will check mag  Compression stockings  vde negative       * Acute respiratory failure with hypoxia ([facility])  Assessment & Plan  · Secondary to new onset heart failure exacerbation - acute diastolic heart failure- normal ef- she does have pulmonary hypertension   improving- with diuresis   Will continue ttitrating down the oxygen         VTE Pharmacologic Prophylaxis: Pharmacologic: Pharmacologic VTE Prophylaxis contraindicated due to Thrombocytopenia  Mechanical VTE Prophylaxis in Place: Yes    Patient Centered Rounds: I have performed bedside rounds with nursing staff today  Discussions with Specialists or Other Care Team Provider:  I discussed with Cardiology    Education and Discussions with Family / Patient:  Patient and I discussed with daughter yesterday  Time Spent for Care: 30 minutes  More than 50% of total time spent on counseling and coordination of care as described above  Current Length of Stay: 3 day(s)    Current Patient Status: Inpatient   Certification Statement: The patient will continue to require additional inpatient hospital stay due to CHF exacerbation    Discharge Plan:  Pending progress    Code Status: Level 3 - DNAR and DNI      Subjective:   Patient seen and examined denies any shortness of breath no chest pain coughing is improving it still dry  Asking when she can go home  Objective:     Vitals:   Temp (24hrs), Av 4 °F (36 3 °C), Min:96 9 °F (36 1 °C), Max:98 1 °F (36 7 °C)    Temp:  [96 9 °F (36 1 °C)-98 1 °F (36 7 °C)] 96 9 °F (36 1 °C)  HR:  [66-72] 72  Resp:  [18-20] 20  BP: ()/(53-79) 117/62  SpO2:  [93 %-97 %] 96 %  Body mass index is 45 24 kg/m²  Input and Output Summary (last 24 hours): Intake/Output Summary (Last 24 hours) at 3/13/2020 0926  Last data filed at 3/13/2020 0830  Gross per 24 hour   Intake 1197 ml   Output 1425 ml   Net -228 ml       Physical Exam:     Physical Exam   Constitutional: She is oriented to person, place, and time  She appears well-developed and well-nourished  HENT:   Head: Normocephalic and atraumatic  Eyes: Pupils are equal, round, and reactive to light  EOM are normal    Neck: Normal range of motion  Cardiovascular: Normal rate, regular rhythm and normal heart sounds  Pulmonary/Chest: Effort normal  She has rales (Bibasilar)  Abdominal: Soft   Bowel sounds are normal  Musculoskeletal: Normal range of motion  She exhibits edema (Plus one)  Neurological: She is alert and oriented to person, place, and time  She has normal reflexes  cranial nerve 2-12 are normal   Normal neurological exam   Skin: Skin is warm  Psychiatric: She has a normal mood and affect  Additional Data:     Labs:    Results from last 7 days   Lab Units 03/13/20  0442   WBC Thousand/uL 5 40   HEMOGLOBIN g/dL 8 4*   HEMATOCRIT % 26 2*   PLATELETS Thousands/uL 85*   NEUTROS PCT % 61   LYMPHS PCT % 28   MONOS PCT % 8   EOS PCT % 2     Results from last 7 days   Lab Units 03/13/20  0442   SODIUM mmol/L 135*   POTASSIUM mmol/L 4 3   CHLORIDE mmol/L 95*   CO2 mmol/L 37*   BUN mg/dL 22   CREATININE mg/dL 1 22*   ANION GAP mmol/L 3*   CALCIUM mg/dL 9 1   ALBUMIN g/dL 2 8*   TOTAL BILIRUBIN mg/dL 1 30*   ALK PHOS U/L 105   ALT U/L 21   AST U/L 48*   GLUCOSE RANDOM mg/dL 79     Results from last 7 days   Lab Units 03/11/20  0614   INR  1 09             Results from last 7 days   Lab Units 03/11/20  0614   PROCALCITONIN ng/ml <0 05           * I Have Reviewed All Lab Data Listed Above  * Additional Pertinent Lab Tests Reviewed: All Labs Within Last 24 Hours Reviewed    Imaging:    Imaging Reports Reviewed Today Include:  Chest x-rays be done today  Imaging Personally Reviewed by Myself Includes:      Recent Cultures (last 7 days):     Results from last 7 days   Lab Units 03/10/20  1555   BLOOD CULTURE  No Growth at 48 hrs  No Growth at 48 hrs         Last 24 Hours Medication List:     Current Facility-Administered Medications:  aspirin 81 mg Oral Daily Judithe Jeans, MD   benzonatate 100 mg Oral TID PRN Judithe Jeans, MD   cyanocobalamin 1,000 mcg Oral Daily Judithe Jeans, MD   dextromethorphan-guaiFENesin 10 mL Oral Q4H PRN Judithe Jeans, MD   folic acid 1 mg Oral Daily EUN Colindres   ipratropium-albuterol 3 mL Nebulization Q6H PRN EUN Bartholomew   metoprolol tartrate 12 5 mg Oral Q12H [facility] Lan Carreon MD   nitroglycerin 0 4 mg Sublingual Q5 Min PRN Lan Carreon MD   nystatin  Topical BID Lan Carreon MD   potassium chloride 20 mEq Oral Daily Lan Carreon MD        Today, Patient Was Seen By: Lan Carreon MD    ** Please Note: Dictation voice to text software may have been used in the creation of this document   **

## 2020-03-14 NOTE — PLAN OF CARE
Problem: DISCHARGE PLANNING - CARE MANAGEMENT  Goal: Discharge to post-acute care or home with appropriate resources  Description  INTERVENTIONS:  - Conduct assessment to determine patient/family and health care team treatment goals, and need for post-acute services based on payer coverage, community resources, and patient preferences, and barriers to discharge  - Address psychosocial, clinical, and financial barriers to discharge as identified in assessment in conjunction with the patient/family and health care team  - Arrange appropriate level of post-acute services according to patients   needs and preference and payer coverage in collaboration with the physician and health care team  - Communicate with and update the patient/family, physician, and health care team regarding progress on the discharge plan  - Arrange appropriate transportation to post-acute venues  Out[facility]

## 2020-03-14 NOTE — PROGRESS NOTES
Progress Note - Sydnee Lopez 1945, 75 y o  female MRN: [id number]    Unit/Bed#: 7T [facility] 703-02 Encounter: [id number]    Primary Care Provider: No primary care provider on file  Date and time admitted to hospital: 3/10/2020  3:41 PM        Ventricular tachycardia ([facility] [address] )  Assessment & Plan  · No recurrence of V-tach  Continue metoprolol  Cholelithiasis  Assessment & Plan  · Asymptomatic - no need for any surgical intervention     [facility]  Assessment & Plan  · Evidenced by liver countor on us- with thrombocytopenia   · Unknown etiology   · Does not drink alcohol no drugs  · Has not seen physician in 20 years   · inr normal   · Chronic hep panel pending   · ?cardiac cirrhosis   · Has been diuresed currently on hold today with slight cr elevation and contraction alkalosis   · Will GI eval as well on opinion of etiology /recs- will have to follow-up as an outpatient  · Will check albumin -low -    Vitamin B12 deficiency  Assessment & Plan  · Level at her age should be >400 , jwill start/continue  oral 1000mcg po daily     Thrombocytopenia ([facility] [address] )  Assessment & Plan  · Has not seen a physician in 20 years  · Evaluated by heme onc- most likley chronic process  · Improved will place on lovenox at 30mg daily for dvt prophylaxis and monitor   · Further work up ordered by miles on c  · Liver us reviewed looks like has liver cirrhosis - suspect thrombocytopenia 2/2 liver disease  · No bleeding   · Hep panel -negand hiv pending  · Will follow outpatient with heme onc  · Vitamin b12 supplemented     Acute anemia  Assessment & Plan  · Patient has not seen a physician in 20 years  Her hemoglobin is 9  Will check anemia profile vitamin B12 - low for her age <400 start oral suppl /folic acid  Will check Hemoccult- patient reports no melena or hematochezia- still pending       · Iron studies -> iron deficiency   · Evaluated by heme onc as well  · venofer for 2 doses given   · Hb stable as today monitor as on aspirin   · retic >2-ldh negative   · Will follow up with heme onc as outpatient   · Also asked gi to see - evaluated by GI  Still Hemoccult have not been done hemoglobin has been stable she declined any endoscopic evaluation  · RF is elevated YVETTE is still pending I will get an anti CCP  Troponin I above reference range  Assessment & Plan  · Non MI elevation->     Acute diastolic heart failure (HCC)  Assessment & Plan  Wt Readings from Last 3 Encounters:   03/14/20 111 kg (244 lb 14 9 oz)   echo - normal ef 60%, normal systolic right sided function   She does have mild -moderate MR,TR and pulmonary hypertension   gaine one lb from yesterday - bicarb still up will give one dose of diamox 250mg- anticipate to start po lasix lisset  , cr improved    evaluated by cardiology as well  Continue asa   Repeat cxr in am still pending  Repeat probnp in am   Also cxr better   Continue 1500 ml restriction   Will obtain vbg        * Acute respiratory failure with hypoxia ([address] )  Assessment & Plan  · Secondary to new onset heart failure exacerbation - acute diastolic heart failure- normal ef- she does have pulmonary hypertension   improving- with diuresis  Will continue ttitrating down the oxygen         VTE Pharmacologic Prophylaxis:   Pharmacologic: Enoxaparin (Lovenox)  Mechanical VTE Prophylaxis in Place: Yes    Patient Centered Rounds: I have performed bedside rounds with nursing staff today  Discussions with Specialists or Other Care Team Provider: discussed with CM    Education and Discussions with Family / Patient: patient and daughter yesterday     Time Spent for Care: 30 minutes  More than 50% of total time spent on counseling and coordination of care as described above      Current Length of Stay: 4 day(s)    Current Patient Status: Inpatient   Certification Statement: The patient will continue to require additional inpatient hospital stay due to chf    Discharge Plan: pending progress    Code Status: Level 3 - DNAR and DNI      Subjective:   Patient seen and examined - no sob or chest pain , still some nonproductive cough    Objective:     Vitals:   Temp (24hrs), Av 3 °F (36 3 °C), Min:97 °F (36 1 °C), Max:97 5 °F (36 4 °C)    Temp:  [97 °F (36 1 °C)-97 5 °F (36 4 °C)] 97 °F (36 1 °C)  HR:  [58-76] 76  Resp:  [18-20] 20  BP: (109-126)/(60-68) 126/60  SpO2:  [94 %-97 %] 97 %  Body mass index is 45 53 kg/m²  Input and Output Summary (last 24 hours): Intake/Output Summary (Last 24 hours) at 3/14/2020 1007  Last data filed at 3/14/2020 0900  Gross per 24 hour   Intake 1140 ml   Output 1200 ml   Net -60 ml       Physical Exam:     Physical Exam   Constitutional: She is oriented to person, place, and time  obese   HENT:   Head: Normocephalic and atraumatic  Eyes: Pupils are equal, round, and reactive to light  EOM are normal    Neck: Normal range of motion  Cardiovascular: Normal rate, regular rhythm and normal heart sounds  Pulmonary/Chest: Effort normal    Decreased at bases   Abdominal: Soft  Bowel sounds are normal    Musculoskeletal: Normal range of motion  She exhibits edema (trace b/l)  Neurological: She is alert and oriented to person, place, and time  She has normal reflexes  cranial nerve 2-12 are normal   Normal neurological exam   Skin: Skin is warm  Psychiatric: She has a normal mood and affect           Additional Data:     Labs:    Results from last 7 days   Lab Units 20  0446   WBC Thousand/uL 5 10   HEMOGLOBIN g/dL 8 4*   HEMATOCRIT % 26 5*   PLATELETS Thousands/uL 92*   NEUTROS PCT % 62   LYMPHS PCT % 26   MONOS PCT % 9   EOS PCT % 2     Results from last 7 days   Lab Units 20  0446 20  0442   SODIUM mmol/L 136* 135*   POTASSIUM mmol/L 3 9 4 3   CHLORIDE mmol/L 94* 95*   CO2 mmol/L 39* 37*   BUN mg/dL 23 22   CREATININE mg/dL 1 17 1 22*   ANION GAP mmol/L 3* 3*   CALCIUM mg/dL 9 3 9 1   ALBUMIN g/dL  --  2 8*   TOTAL BILIRUBIN mg/dL  --  1 30*   ALK PHOS U/L  --  105   ALT U/L  --  21   AST U/L  --  48*   GLUCOSE RANDOM mg/dL 88 79     Results from last 7 days   Lab Units 03/11/20  0614   INR  1 09             Results from last 7 days   Lab Units 03/11/20  0614   PROCALCITONIN ng/ml <0 05           * I Have Reviewed All Lab Data Listed Above  * Additional Pertinent Lab Tests Reviewed: All Labs Within Last 24 Hours Reviewed    Imaging:    Imaging Reports Reviewed Today Include: none today   Imaging Personally Reviewed by Myself Includes:      Recent Cultures (last 7 days):     Results from last 7 days   Lab Units 03/10/20  1555   BLOOD CULTURE  No Growth at 72 hrs  No Growth at 72 hrs  Last 24 Hours Medication List:     Current Facility-Administered Medications:  acetaZOLAMIDE 250 mg Intravenous Once Jules Barnard MD   aspirin 81 mg Oral Daily Jules Barnard MD   benzonatate 100 mg Oral TID PRN Jules Barnard MD   cyanocobalamin 1,000 mcg Oral Daily Jules Barnard MD   dextromethorphan-guaiFENesin 10 mL Oral Q4H PRN Jules Barnard MD   enoxaparin 30 mg Subcutaneous Q24H [facility] Jules Barnard MD   folic acid 1 mg Oral Daily EUN Edmond   ipratropium-albuterol 3 mL Nebulization Q6H PRN EUN Agee   metoprolol tartrate 12 5 mg Oral Q12H [facility] Jules Barnard MD   nitroglycerin 0 4 mg Sublingual Q5 Min PRN Jules Barnard MD   nystatin  Topical BID Jules Barnard MD   potassium chloride 20 mEq Oral Daily Jules Barnard MD        Today, Patient Was Seen By: Jules Barnard MD    ** Please Note: Dictation voice to text software may have been used in the creation of this document   **

## 2020-03-14 NOTE — ASSESSMENT & PLAN NOTE
Wt Readings from Last 3 Encounters:   03/14/20 111 kg (244 lb 14 9 oz)   echo - normal ef 39%, normal systolic right sided function   She does have mild -moderate MR,TR and pulmonary hypertension   gaine one lb from yesterday - bicarb still up will give one dose of diamox 250mg- anticipate to start po lasix lisset  , cr improved    evaluated by cardiology as well  Continue asa       Repeat cxr in am still pending  Repeat probnp in am   Also cxr better   Continue 1500 ml restriction   Will obtain vbg

## 2020-03-14 NOTE — SOCIAL WORK
LOS: 4 GMLOS: none    Pt is not a bundle pt and is not a 30 day readmission  Pt presented at ED from home w/ chief complaint of SOB ad cough  Pt admitted and being treated for acute respiratory failure w/ hypoxia and acute diastolic heart failure  PT and Ot consulted  PT and OT recommending STR  SW met w/ pt to complete assessment  Pt was alert and able to complete assessment questions  SW confirmed that pt's emergency contacts are her daughter Sandrine Hernandez ([phone]) and her daughter Armani Zeng ([phone])  Pt does not have any advanced directives  Pt is a  and receives SSI  Pt lives w/ her daughter Cristel Irving and grandson Jeremiah Gonsalez who are home with her 24/7  Pt lives in a [address] [facility] w/ a 2nd floor set up ( to 2nd) and 4 HEATH  Pt stated that prior to presentation, she was able to perform all ADLs independently  Pt utilizes a SPC to ambulate  Pt does not own any other DME  Pt does not drive and pt's daughter Kirsten (who she lives with) does not drive either  Pt's daughter Cody Perkins who lives in [place] transports as needed  Pt is not open w/ any in home services and does not receive any services from community resources  Pt does not have a PCP, is not a , has no legal issues, no SNF hx and  no mental health hx  Pt stated that she has not smoked cigarettes in 25 years  Pt confirmed that she has Medicare A & B and no secondary insurance  Pt utilizes [facility] drug store on [address]  Pt stated that prior to presentation she did not take medication but stated that for future medication, cost may be a barrier to getting medications  SW discussed PT and OT recommendation of STR  Pt would like to go to STR  Pt would like to stay in the \[facility]\"" area due to pt's daughter who does not drive living in \[place]\""  SW provided pt w/ list of providers in the local \[facility]\"" area  Pt's preferences in order are [facility], [facility], [facility], and [facility]  SW sent referrals       Pt's daughter's Dot and [facility] visited pt  SW discussed above w/ pt's daughter  [facility] and Dot would like to be updated regarding which facilities can accept  SW will follow for plan of care

## 2020-03-14 NOTE — ASSESSMENT & PLAN NOTE
· Has not seen a physician in 20 years      · Evaluated by heme onc- most likley chronic process  · Improved will place on lovenox at 30mg daily for dvt prophylaxis and monitor   · Further work up ordered by [facility] on c  · Liver us reviewed looks like has liver cirrhosis - suspect thrombocytopenia 2/2 liver disease  · No bleeding   · Hep panel -negand hiv pending  · Will follow outpatient with heme onc  · Vitamin b12 supplemented

## 2020-03-15 PROBLEM — N17.9 AKI (ACUTE KIDNEY INJURY) (HCC): Status: ACTIVE | Noted: 2020-01-01

## 2020-03-15 NOTE — ASSESSMENT & PLAN NOTE
· Patient has not seen a physician in 20 years  Her hemoglobin is 9  Will check anemia profile vitamin B12 - low for her age <400 start oral suppl /folic acid  Will check Hemoccult- patient reports no melena or hematochezia- still pending    · Iron studies -> iron deficiency   · Evaluated by heme onc as well  · venofer for 2 doses given   · Hb stable as today monitor as on aspirin   · retic >2-ldh negative   · Will follow up with heme onc as outpatient   · Also asked gi to see - evaluated by GI  Still Hemoccult have not been done hemoglobin has been stable she declined any endoscopic evaluation  · RF is elevated YVETTE is still pending I will get an anti CCP  · She has abnormal keppa/sushila chain elevated ?  MM will discuss with heme onc   · Start iron and will add colace daily

## 2020-03-15 NOTE — ASSESSMENT & PLAN NOTE
· Has not seen a physician in 20 years      · Evaluated by heme onc- most likley chronic process  · Improved will place on lovenox at 30mg daily for dvt prophylaxis and monitor   · Further work up ordered by miles on c  · Liver us reviewed looks like has liver cirrhosis - suspect thrombocytopenia 2/2 liver disease  · No bleeding   · Hep panel -negand and hiv neg   · Will follow outpatient with heme onc  · Vitamin b12 supplemented

## 2020-03-15 NOTE — ASSESSMENT & PLAN NOTE
· Admission cr 1 08 - due to diuresis suspect although no blood work for 20 years  · Will hold of any diuresis   · Bmp in am

## 2020-03-15 NOTE — ASSESSMENT & PLAN NOTE
Wt Readings from Last 3 Encounters:   03/15/20 111 kg (244 lb 11 4 oz)   echo - normal ef 51%, normal systolic right sided function   She does have mild -moderate MR,TR and pulmonary hypertension   gaine one lb from yesterday - but stable received dose of diamox cr went up- will hold any diuresis today , good urine out put     evaluated by cardiology as well  Continue asa       Repeat probnp in am trending down   Also cxr better   Continue 1500 ml restriction   Will obtain vbg- reviewed contraction   Bicarb today improved

## 2020-03-15 NOTE — PROGRESS NOTES
Progress Note - Vincent Divers 1945, 75 y o  female MRN: [id number]    Unit/Bed#: 7T [facility] 703-02 Encounter: [id number]    Primary Care Provider: No primary care provider on file  Date and time admitted to hospital: 3/10/2020  3:41 PM        SACHA (acute kidney injury) ([facility] )  Assessment & Plan  · Admission cr 1 08 - due to diuresis suspect although no blood work for 20 years  · Will hold of any diuresis   · Bmp in am     Ventricular tachycardia ([facility] )  Assessment & Plan  · No recurrence of V-tach  Continue metoprolol  Cholelithiasis  Assessment & Plan  · Asymptomatic - no need for any surgical intervention     Cirrhosis of liver without ascites ([facility] )  Assessment & Plan  · Evidenced by liver countor on us- with thrombocytopenia   · Unknown etiology   · Does not drink alcohol no drugs  · Has not seen physician in 20 years   · inr normal   · Chronic hep panel pending   · ?cardiac cirrhosis   · Has been diuresed currently on hold today with slight cr elevation and contraction alkalosis   · Will GI eval as well on opinion of etiology /recs- will have to follow-up as an outpatient  · Will check albumin -low -    Vitamin B12 deficiency  Assessment & Plan  · Level at her age should be >400 , jwill start/continue  oral 1000mcg po daily     Thrombocytopenia ([facility] )  Assessment & Plan  · Has not seen a physician in 20 years  · Evaluated by heme onc- most likley chronic process  · Improved will place on lovenox at 30mg daily for dvt prophylaxis and monitor   · Further work up ordered by miles on c  · Liver us reviewed looks like has liver cirrhosis - suspect thrombocytopenia 2/2 liver disease  · No bleeding   · Hep panel -negand and hiv neg   · Will follow outpatient with heme onc  · Vitamin b12 supplemented     Acute anemia  Assessment & Plan  · Patient has not seen a physician in 20 years  Her hemoglobin is 9  Will check anemia profile vitamin B12 - low for her age <400 start oral suppl /folic acid    Will check Hemoccult- patient reports no melena or hematochezia- still pending    · Iron studies -> iron deficiency   · Evaluated by heme onc as well  · venofer for 2 doses given   · Hb stable as today monitor as on aspirin   · retic >2-ldh negative   · Will follow up with heme onc as outpatient   · Also asked gi to see - evaluated by GI  Still Hemoccult have not been done hemoglobin has been stable she declined any endoscopic evaluation  · RF is elevated YVETTE is still pending I will get an anti CCP  · She has abnormal keppa/sushila chain elevated ? MM will discuss with heme onc   · Start iron and will add colace daily     Troponin I above reference range  Assessment & Plan  · Non MI elevation->     Acute diastolic heart failure (HCC)  Assessment & Plan  Wt Readings from Last 3 Encounters:   03/15/20 111 kg (244 lb 11 4 oz)   echo - normal ef 72%, normal systolic right sided function   She does have mild -moderate MR,TR and pulmonary hypertension   gaine one lb from yesterday - but stable received dose of diamox cr went up- will hold any diuresis today , good urine out put     evaluated by cardiology as well  Continue asa   Repeat probnp in am trending down   Also cxr better   Continue 1500 ml restriction   Will obtain vbg- reviewed contraction   Bicarb today improved         * Acute respiratory failure with hypoxia (HCC)  Assessment & Plan  · Secondary to new onset heart failure exacerbation - acute diastolic heart failure- normal ef- she does have pulmonary hypertension   improving- with diuresis  Will continue ttitrating down the oxygen         VTE Pharmacologic Prophylaxis:   Pharmacologic: Enoxaparin (Lovenox)  Mechanical VTE Prophylaxis in Place: Yes    Patient Centered Rounds: I have performed bedside rounds with nursing staff today      Discussions with Specialists or Other Care Team Provider: discussed with CM     Education and Discussions with Family / Patient: daughters yesterday     Time Spent for Care: 27 minutes  More than 50% of total time spent on counseling and coordination of care as described above  Current Length of Stay: 5 day(s)    Current Patient Status: Inpatient   Certification Statement: The patient will continue to require additional inpatient hospital stay due to chf /sen     Discharge Plan: pending progress     Code Status: Level 3 - DNAR and DNI      Subjective:   Patient seen and examined, denies any chest pain or sob, although still dry cough    Objective:     Vitals:   Temp (24hrs), Av 2 °F (36 2 °C), Min:96 6 °F (35 9 °C), Max:97 5 °F (36 4 °C)    Temp:  [96 6 °F (35 9 °C)-97 5 °F (36 4 °C)] 97 4 °F (36 3 °C)  HR:  [57-71] 57  Resp:  [18-20] 18  BP: ()/(52-69) 117/60  SpO2:  [93 %-95 %] 93 %  Body mass index is 45 49 kg/m²  Input and Output Summary (last 24 hours): Intake/Output Summary (Last 24 hours) at 3/15/2020 0911  Last data filed at 3/15/2020 3952  Gross per 24 hour   Intake 1317 ml   Output 1250 ml   Net 67 ml       Physical Exam:     Physical Exam   Constitutional: She is oriented to person, place, and time  She appears well-developed and well-nourished  HENT:   Head: Normocephalic and atraumatic  Eyes: Pupils are equal, round, and reactive to light  EOM are normal    Neck: Normal range of motion  Cardiovascular: Normal rate, regular rhythm and normal heart sounds  Pulmonary/Chest: Effort normal    NETTE crackles   Abdominal: Soft  Bowel sounds are normal  She exhibits no distension  There is no tenderness  Musculoskeletal: Normal range of motion  She exhibits no edema  Neurological: She is alert and oriented to person, place, and time  She has normal reflexes  cranial nerve 2-12 are normal   Normal neurological exam   Skin: Skin is warm  Psychiatric: She has a normal mood and affect           Additional Data:     Labs:    Results from last 7 days   Lab Units 03/15/20  0550   WBC Thousand/uL 5 20   HEMOGLOBIN g/dL 8 2*   HEMATOCRIT % 25 9*   PLATELETS Thousands/uL 101*   NEUTROS PCT % 63   LYMPHS PCT % 26   MONOS PCT % 8   EOS PCT % 3     Results from last 7 days   Lab Units 03/15/20  0550  03/13/20  0442   SODIUM mmol/L 135*   < > 135*   POTASSIUM mmol/L 3 9   < > 4 3   CHLORIDE mmol/L 97   < > 95*   CO2 mmol/L 34*   < > 37*   BUN mg/dL 25   < > 22   CREATININE mg/dL 1 40*   < > 1 22*   ANION GAP mmol/L 4*   < > 3*   CALCIUM mg/dL 9 3   < > 9 1   ALBUMIN g/dL  --   --  2 8*   TOTAL BILIRUBIN mg/dL  --   --  1 30*   ALK PHOS U/L  --   --  105   ALT U/L  --   --  21   AST U/L  --   --  48*   GLUCOSE RANDOM mg/dL 89   < > 79    < > = values in this interval not displayed  Results from last 7 days   Lab Units 03/11/20  0614   INR  1 09             Results from last 7 days   Lab Units 03/11/20  0614   PROCALCITONIN ng/ml <0 05           * I Have Reviewed All Lab Data Listed Above  * Additional Pertinent Lab Tests Reviewed: All Labs Within Last 24 Hours Reviewed    Imaging:    Imaging Reports Reviewed Today Include: none today   Imaging Personally Reviewed by Myself Includes:      Recent Cultures (last 7 days):     Results from last 7 days   Lab Units 03/10/20  1555   BLOOD CULTURE  No Growth After 4 Days  No Growth After 4 Days         Last 24 Hours Medication List:     Current Facility-Administered Medications:  aspirin 81 mg Oral Daily Batool Hickey MD   benzonatate 100 mg Oral TID PRN Batool Hickey MD   bisacodyl 10 mg Oral Once Batool Hickey MD   cyanocobalamin 1,000 mcg Oral Daily Batool Hickey MD   dextromethorphan-guaiFENesin 10 mL Oral Q4H PRN Batool Hickey MD   docusate sodium 100 mg Oral Daily Batool Hickey MD   enoxaparin 30 mg Subcutaneous Q24H [facility] Batool Hickey MD   ferrous gluconate 324 mg Oral Daily Before Breakfast Batool Hickey MD   folic acid 1 mg Oral Daily EUN El   guaiFENesin 600 mg Oral Q12H Matt Lockett MD   ipratropium-albuterol 3 mL Nebulization Q6H PRN EUN Chiang   metoprolol tartrate 12 5 mg Oral Q12H [address] Abelardo Randhawa MD   nitroglycerin 0 4 mg Sublingual Q5 Min PRN Abelardo Randhawa MD   nystatin  Topical BID Abelardo Randhawa MD        Today, Patient Was Seen By: Abelardo Randhawa MD    ** Please Note: Dictation voice to text software may have been used in the creation of this document   **

## 2020-03-16 NOTE — PLAN OF CARE
Problem: OCCUPATIONAL THERAPY ADULT  Goal: Performs self-care activities at highest level of function for planned discharge setting  See evaluation for individualized goals  Description  Treatment Interventions: ADL retraining, Functional transfer training, UE strengthening/ROM, Endurance training, Continued evaluation, Activityengagement          See flowsheet documentation for full assessment, interventions and recommendations  Outcome: Progressing  Note:   Limitation: Decreased ADL status, Decreased UE strength, Decreased Safe judgement during ADL, Decreased cognition, Decreased endurance, Decreased high-level ADLs     Assessment: Pt seen for OT txt  Pt pleasant and motivated for therapy  Pt on 1L O2 throughout session  Pt with desaturation to range of 89-91% with ambulation to the bathroom  Pt required CGA to complete grooming tasks while standing at the sink, noted heavy reliance on sink for support with  BUEs  Pt requires cues for breathing and initiating rest breaks, Carl for cane management and aligning to midline  Pt would benefit from continued OT services to further address deficits with balance, activity tolerance, and safety  Continue OT POC        OT Discharge Recommendation: Short Term Rehab

## 2020-03-16 NOTE — OCCUPATIONAL THERAPY NOTE
Occupational Therapy Treatment Note     Patient Name: Gerri Maciel  Today's Date: 3/16/2020  Problem List  Principal Problem:    Acute respiratory failure with hypoxia [facility])  Active Problems:    Acute diastolic heart failure (HCC)    Troponin I above reference range    Acute anemia    Thrombocytopenia (HCC)    Vitamin B12 deficiency    Cirrhosis of liver without ascites (HCC)    Cholelithiasis    Ventricular tachycardia (HCC)    SACHA (acute kidney injury) ([facility] )              03/16/20 2400-1368 (24 minutes)   Restrictions/Precautions   Weight Bearing Precautions Per Order No   Other Precautions Fall Risk;O2   Pain Assessment   Pain Assessment Tool Pain Assessment not indicated - pt denies pain   ADL   Grooming Assistance 4  Minimal Assistance   Grooming Deficit Wash/dry hands; Wash/dry face; Teeth care   Grooming Comments while standing at the sink    LB Dressing Assistance 3  Moderate Assistance   Toileting Assistance  2  Maximal Assistance   Functional Standing Tolerance   Time ~ 5 minutes   Activity grooming tasks at the sink    Transfers   Sit to Stand 4  Minimal assistance   Additional items Assist x 1   Stand to Sit 4  Minimal assistance   Additional items Assist x 1  (decreased control with descent )   Functional Mobility   Functional Mobility 4  Minimal assistance   Additional Comments x 1, to bathroom and back to bed    Additional items [facility]   Cognition   Arousal/Participation Alert; Cooperative   Attention Within functional limits   Orientation Level Oriented X4   Memory Within functional limits   Following Commands Follows all commands and directions without difficulty   Activity Tolerance   Activity Tolerance Patient tolerated treatment well   Assessment   Assessment Pt seen for OT txt  Pt pleasant and motivated for therapy  Pt on 1L O2 throughout session  Pt with desaturation to range of 89-91% with ambulation to the bathroom    Pt required CGA to complete grooming tasks while standing at the sink, noted heavy reliance on sink for support with  BUEs  Pt requires cues for breathing and initiating rest breaks, Carl for cane management and aligning to midline  Pt would benefit from continued OT services to further address deficits with balance, activity tolerance, and safety  Continue OT POC  Plan   Treatment Interventions ADL retraining;Functional transfer training;UE strengthening/ROM; Endurance training;Continued evaluation; Activityengagement   Goal Expiration Date 03/18/20   OT Treatment Day 2   OT Frequency 2-3x/wk   Recommendation   OT Discharge Recommendation Short Term Rehab

## 2020-03-16 NOTE — ASSESSMENT & PLAN NOTE
· Secondary to new onset heart failure exacerbation - acute diastolic heart failure- normal ef- she does have pulmonary hypertension   improving- with diuresis  Will continue ttitrating down the oxygen requiring only 1 L of oxygen  · No clinical evidence of pneumonia with procalcitonin x2 being negative  · Also has atelectasis throughout the lungs

## 2020-03-16 NOTE — ASSESSMENT & PLAN NOTE
· Patient has not seen a physician in 20 years  Her hemoglobin is 9  Will check anemia profile vitamin B12 - low for her age <400 start oral suppl /folic acid  Will check Hemoccult- patient reports no melena or hematochezia- still pending    · Iron studies -> iron deficiency   · Evaluated by heme onc as well  · venofer for 2 doses given   · Hb stable as today monitor as on aspirin   · retic >2-ldh negative   · Will follow up with heme onc as outpatient   · Also asked gi to see - evaluated by GI  Still Hemoccult have not been done hemoglobin has been stable she declined any endoscopic evaluation  · RF is elevated YVETTE is still pending I will get an anti CCP  · She has abnormal keppa/sushila chain elevated ?  MM will discuss with heme onc tiger texted  · Continue iron and  add colace daily   · Hemoglobin stable does not drop lower than being in the 8

## 2020-03-16 NOTE — ASSESSMENT & PLAN NOTE
· Admission cr 1 08 - due to diuresis suspect although no blood work for 20 years improving  · Will hold of any diuresis   · Bmp in am

## 2020-03-16 NOTE — PROGRESS NOTES
Progress Note - Ele Kevin 1945, 75 y o  female MRN: [id number]    Unit/Bed#: 7T [facility] 703-02 Encounter: [id number]    Primary Care Provider: No primary care provider on file  Date and time admitted to hospital: 3/10/2020  3:41 PM        SACHA (acute kidney injury) ([facility] )  Assessment & Plan  · Admission cr 1 08 - due to diuresis suspect although no blood work for 20 years improving  · Will hold of any diuresis   · Bmp in am     Ventricular tachycardia ([facility] )  Assessment & Plan  · No recurrence of V-tach  Continue metoprolol  Cholelithiasis  Assessment & Plan  · Asymptomatic - no need for any surgical intervention     Cirrhosis of liver without ascites ([facility] )  Assessment & Plan  · Evidenced by liver countor on us- with thrombocytopenia   · Unknown etiology   · Does not drink alcohol no drugs  · Has not seen physician in 20 years   · inr normal   · Chronic hep panel pending   · ?cardiac cirrhosis   · Has been diuresed currently on hold today with slight cr elevation and contraction alkalosis   · Will GI eval as well on opinion of etiology /recs- will have to follow-up as an outpatient  · Will check albumin -low -    Vitamin B12 deficiency  Assessment & Plan  · Level at her age should be >400 , jwill start/continue  oral 1000mcg po daily     [facility]  Assessment & Plan  · Has not seen a physician in 20 years  · Evaluated by heme onc- most likley chronic process  · Improved will place on lovenox at 30mg daily for dvt prophylaxis and monitor   · Further work up ordered by miles on c  · Liver us reviewed looks like has liver cirrhosis - suspect thrombocytopenia 2/2 liver disease  · No bleeding   · Hep panel -negand and hiv neg   · Will follow outpatient with heme onc  · Vitamin b12 supplemented     Acute anemia  Assessment & Plan  · Patient has not seen a physician in 20 years  Her hemoglobin is 9  Will check anemia profile vitamin B12 - low for her age <400 start oral suppl /folic acid  Will check Hemoccult- patient reports no melena or hematochezia- still pending    · Iron studies -> iron deficiency   · Evaluated by heme onc as well  · venofer for 2 doses given   · Hb stable as today monitor as on aspirin   · retic >2-ldh negative   · Will follow up with heme onc as outpatient   · Also asked gi to see - evaluated by GI  Still Hemoccult have not been done hemoglobin has been stable she declined any endoscopic evaluation  · RF is elevated YVETTE is still pending I will get an anti CCP  · She has abnormal keppa/sushila chain elevated ? MM will discuss with heme onc tiger texted  · Continue iron and  add colace daily   · Hemoglobin stable does not drop lower than being in the 8    Troponin I above reference range  Assessment & Plan  · Non MI elevation->     Acute diastolic heart failure (HCC)  Assessment & Plan  Wt Readings from Last 3 Encounters:   03/16/20 110 kg (243 lb 9 7 oz)   echo - normal ef 82%, normal systolic right sided function   She does have mild -moderate MR,TR and pulmonary hypertension weight stable she is euvolemic  Creatinine trending down slowly when back to baseline place her on low-dose p o  Lasix   good urine out put     evaluated by cardiology as well  Continue asa   Repeat probnp in am trending down   Also cxr better   Continue 1500 ml restriction   Will obtain vbg- reviewed contraction   Bicarb today improved         * Acute respiratory failure with hypoxia (HCC)  Assessment & Plan  · Secondary to new onset heart failure exacerbation - acute diastolic heart failure- normal ef- she does have pulmonary hypertension   improving- with diuresis  Will continue ttitrating down the oxygen requiring only 1 L of oxygen  · No clinical evidence of pneumonia with procalcitonin x2 being negative  · Also has atelectasis throughout the lungs  VTE Pharmacologic Prophylaxis:   Pharmacologic: Enoxaparin (Lovenox)  Mechanical VTE Prophylaxis in Place: Yes    Patient Centered Rounds:  I have performed bedside rounds with nursing staff today  Discussions with Specialists or Other Care Team Provider:  I discussed with case management    Education and Discussions with Family / Patient:  Patient    Time Spent for Care: 30 minutes  More than 50% of total time spent on counseling and coordination of care as described above  Current Length of Stay: 6 day(s)    Current Patient Status: Inpatient   Certification Statement: The patient will continue to require additional inpatient hospital stay due to Acute kidney injury placement    Discharge Plan:  Pending progress    Code Status: Level 3 - DNAR and DNI      Subjective:   Patient seen and examined cough has improved no chest pain or shortness of breath  She finally had a BM yesterday    Objective:     Vitals:   Temp (24hrs), Av 4 °F (36 3 °C), Min:97 1 °F (36 2 °C), Max:97 8 °F (36 6 °C)    Temp:  [97 1 °F (36 2 °C)-97 8 °F (36 6 °C)] 97 2 °F (36 2 °C)  HR:  [68-80] 80  Resp:  [18-20] 18  BP: (112-136)/(55-73) 136/72  SpO2:  [92 %-95 %] 95 %  Body mass index is 45 28 kg/m²  Input and Output Summary (last 24 hours): Intake/Output Summary (Last 24 hours) at 3/16/2020 0934  Last data filed at 3/16/2020 3952  Gross per 24 hour   Intake 660 ml   Output --   Net 660 ml       Physical Exam:     Physical Exam   Constitutional: She is oriented to person, place, and time  Obese   HENT:   Head: Normocephalic and atraumatic  Eyes: Pupils are equal, round, and reactive to light  EOM are normal    Neck: Normal range of motion  Cardiovascular: Normal rate, regular rhythm and normal heart sounds  Pulmonary/Chest: Effort normal    No crackles, no wheezes  Decreased at bases   Abdominal: Soft  Bowel sounds are normal    Musculoskeletal: Normal range of motion  She exhibits no edema  Neurological: She is alert and oriented to person, place, and time  She has normal reflexes     cranial nerve 2-12 are normal   Normal neurological exam   Skin: Skin is warm  Psychiatric: She has a normal mood and affect  Additional Data:     Labs:    Results from last 7 days   Lab Units 03/16/20  0444   WBC Thousand/uL 5 40   HEMOGLOBIN g/dL 8 3*   HEMATOCRIT % 26 2*   PLATELETS Thousands/uL 110*   NEUTROS PCT % 66*   LYMPHS PCT % 23*   MONOS PCT % 8   EOS PCT % 3     Results from last 7 days   Lab Units 03/16/20  0444  03/13/20  0442   SODIUM mmol/L 138   < > 135*   POTASSIUM mmol/L 4 0   < > 4 3   CHLORIDE mmol/L 99   < > 95*   CO2 mmol/L 36*   < > 37*   BUN mg/dL 25   < > 22   CREATININE mg/dL 1 36*   < > 1 22*   ANION GAP mmol/L 3*   < > 3*   CALCIUM mg/dL 9 4   < > 9 1   ALBUMIN g/dL  --   --  2 8*   TOTAL BILIRUBIN mg/dL  --   --  1 30*   ALK PHOS U/L  --   --  105   ALT U/L  --   --  21   AST U/L  --   --  48*   GLUCOSE RANDOM mg/dL 95   < > 79    < > = values in this interval not displayed  Results from last 7 days   Lab Units 03/11/20  0614   INR  1 09             Results from last 7 days   Lab Units 03/11/20  0614   PROCALCITONIN ng/ml <0 05           * I Have Reviewed All Lab Data Listed Above  * Additional Pertinent Lab Tests Reviewed: All Labs Within Last 24 Hours Reviewed    Imaging:    Imaging Reports Reviewed Today Include: none today   Imaging Personally Reviewed by Myself Includes:      Recent Cultures (last 7 days):     Results from last 7 days   Lab Units 03/10/20  1555   BLOOD CULTURE  No Growth After 5 Days  No Growth After 5 Days         Last 24 Hours Medication List:     Current Facility-Administered Medications:  aspirin 81 mg Oral Daily Urbano Corbin MD   benzonatate 100 mg Oral TID PRN Urbano Corbin MD   cyanocobalamin 1,000 mcg Oral Daily Urbano Corbin MD   dextromethorphan-guaiFENesin 10 mL Oral Q4H PRN Urbano Corbin MD   docusate sodium 100 mg Oral Daily Urbano Corbin MD   enoxaparin 30 mg Subcutaneous Q24H [facility] Urbano Corbin MD   ferrous gluconate 324 mg Oral Daily Before Breakfast Urbano Corbin MD   folic acid 1 mg Oral Daily EUN Lopez   guaiFENesin 600 mg Oral Q12H [address] Carole Gresham MD   ipratropium-albuterol 3 mL Nebulization Q6H PRN EUN Gleason   metoprolol tartrate 12 5 mg Oral Q12H [address] Carole Gresham MD   nitroglycerin 0 4 mg Sublingual Q5 Min PRN Carole Gresham MD   nystatin  Topical BID Carole Gresham MD        Today, Patient Was Seen By: Carole Gresham MD    ** Please Note: Dictation voice to text software may have been used in the creation of this document   **

## 2020-03-16 NOTE — NURSING NOTE
Patient discharged to [facility], reports given to receiving RN  Patient transported via wheelchair

## 2020-03-16 NOTE — PHYSICAL THERAPY NOTE
Physical Therapy Daily Treatment Note    Patient's Name: Chandra Levine    Admitting Diagnosis  Shortness of breath [R06 02]  CHF (congestive heart failure) ([facility] ) [I50 9]  Pleural effusion [J90]  Hypoxia [R09 02]  Elevated troponin [R79 89]  Cough in adult patient [R05]    Problem List  Patient Active Problem List   Diagnosis    Acute respiratory failure with hypoxia (HCC)    Acute diastolic heart failure (HCC)    Troponin I above reference range    Acute anemia    Thrombocytopenia (HCC)    Vitamin B12 deficiency    Cirrhosis of liver without ascites ([facility] )    Cholelithiasis    Ventricular tachycardia ([facility] )    SACHA (acute [facility] injury) ([facility] )       Past Medical History  History reviewed  No pertinent past medical history  Past Surgical History  Past Surgical History:   Procedure Laterality Date    APPENDECTOMY      HYSTERECTOMY      TONSILLECTOMY         Vitals:    03/15/20 2100 03/15/20 2300 03/16/20 0559 03/16/20 0729   BP: 118/61 116/73  136/72   BP Location: Left arm Left arm  Left arm   Pulse: 75 68  80   Resp: 20 19  18   Temp: 97 8 °F (36 6 °C) (!) 97 1 °F (36 2 °C)  (!) 97 2 °F (36 2 °C)   TempSrc: Temporal Temporal  Temporal   SpO2: 92% 93%  95%   Weight:   110 kg (243 lb 9 7 oz)    Height:            03/16/20 1000   Pain Assessment   Pain Assessment Tool Pain Assessment not indicated - pt denies pain   Pain Score No Pain   Restrictions/Precautions   Weight Bearing Precautions Per Order No   Other Precautions Fall Risk;O2   General   Chart Reviewed Yes   Response to Previous Treatment Patient with no complaints from previous session  Family/Caregiver Present No   Cognition   Overall Cognitive Status WFL   Arousal/Participation Alert; Cooperative   Attention Within functional limits   Orientation Level Oriented X4   Memory Within functional limits   Following Commands Follows all commands and directions without difficulty   Subjective   Subjective Reporting 8/10 SOB after short ambulation trial    Transfers   Sit to Stand 4  Minimal assistance   Additional items Assist x 1; Armrests; Increased time required;Verbal cues   Stand to Sit 4  Minimal assistance   Additional items Assist x 1; Armrests; Increased time required;Verbal cues   Additional Comments [facility]   Ambulation/Elevation   Gait pattern Excessively slow; Short stride;Decreased foot clearance; Foward flexed; Wide LIZETT   Gait Assistance 4  Minimal assist   Additional items Assist x 1;Verbal cues; Tactile cues   Assistive Device [facility]   Distance 30ft x2   Stair Management Assistance Not tested   Balance   Static Sitting Good   Dynamic Sitting Fair +   Static Standing Fair -   Dynamic Standing Fair -   Ambulatory Fair -   Endurance Deficit   Endurance Deficit Yes   Endurance Deficit Description SOB, KNOX, fatigue   Activity Tolerance   Activity Tolerance Patient limited by fatigue   Medical Staff Made Aware OT   Nurse Made Aware spoke to [facility]   Exercises   Balance training  Standing Balance) Pt stood at sink for 5-8min completing oral hygiene tasks and washing face/hands   Assessment   Prognosis Fair   Problem List Decreased strength;Decreased range of motion;Decreased endurance; Impaired balance;Decreased mobility; Decreased coordination;Obesity; Impaired sensation   Assessment Pt seen today for PT treatement  Found seated EOB alert and agreeable to therapy  She was able to ambulate short household distances with min A today, decreased balance noted although most limited by fatigue and KNOX/SOB  On 1 L NC O2 she was able to maintain above 90% at all times after ambulation trials and while standing  She was able to remain standing for long enough to complete oral hygiene but with UE support on sink and with seated rest break immediately after  At end of treatment pt left seated at EOB as she reports increased comfort here vs recliner     Barriers to Discharge Inaccessible home environment   Goals   Patient Goals to improve breathing   STG Expiration Date 03/31/20 Short Term Goal #1 1) Bed mobility skills with modified independent assistance to facilitate safe return to previous living environment 2) Functional transfers with modified independent assistance to facilitate safe return to previous living environment  3) Ambulation with least restrictive AD modified independent assistance without LOB and stable vitals for safe ambulation home/ community distances  4) Stair training up/down flight 12 step/s with rail/s  and supervision assistance x1 for safe access to previous living environment  5) Improve balance grades to fair + to reduce risk of falls 6) Perform LE HEP independently  7) PT for ongoing pt and family education; DME needs and D/C planning to promote highest level of function in least restrictive environment  PT Treatment Day 4   Plan   Treatment/Interventions Functional transfer training;LE strengthening/ROM; Elevations; Therapeutic exercise; Endurance training;Patient/family training;Equipment eval/education; Bed mobility;Gait training;Spoke to nursing;Spoke to case management;OT   Progress Progressing toward goals   PT Frequency   (3-5x/wk)   Recommendation   Recommendation   (inpt rehab)   Equipment Recommended Elana Baker   PT - OK to Discharge Yes   Additional Comments to rehab when medically cleared       Arely Flaherty PT, DPT

## 2020-03-16 NOTE — ASSESSMENT & PLAN NOTE
Wt Readings from Last 3 Encounters:   03/16/20 110 kg (243 lb 9 7 oz)   echo - normal ef 45%, normal systolic right sided function   She does have mild -moderate MR,TR and pulmonary hypertension weight stable she is euvolemic  Creatinine trending down slowly when back to baseline place her on low-dose p o  Lasix   good urine out put     evaluated by cardiology as well  Continue asa   Repeat probnp in am trending down   Also cxr better   Continue 1500 ml restriction   Will obtain vbg- reviewed contraction   Bicarb today improved   If creatinine continues to improve tomorrow while at transitional care facility I will follow up and start her on low-dose diuretic

## 2020-03-16 NOTE — ASSESSMENT & PLAN NOTE
Wt Readings from Last 3 Encounters:   03/16/20 110 kg (243 lb 9 7 oz)   echo - normal ef 74%, normal systolic right sided function   She does have mild -moderate MR,TR and pulmonary hypertension weight stable she is euvolemic  Creatinine trending down slowly when back to baseline place her on low-dose p o  Lasix   good urine out put     evaluated by cardiology as well  Continue asa       Repeat probnp in am trending down   Also cxr better   Continue 1500 ml restriction   Will obtain vbg- reviewed contraction   Bicarb today improved

## 2020-03-16 NOTE — DISCHARGE SUMMARY
Discharge- [facility] 1945, 75 y o  female MRN: [id number]    Unit/Bed#: 7T [facility] 703-02 Encounter: [id number]    Primary Care Provider: No primary care provider on file  Date and time admitted to hospital: 3/10/2020  3:41 PM        SACHA (acute kidney injury) ([facility] [facility] )  Assessment & Plan  · Admission cr 1 08 - due to diuresis suspect although no blood work for 20 years improving  · Will hold of any diuresis   · Bmp in am     Ventricular tachycardia ([facility] )  Assessment & Plan  · No recurrence of V-tach  Continue metoprolol  Cholelithiasis  Assessment & Plan  · Asymptomatic - no need for any surgical intervention     Cirrhosis of liver without ascites ([facility] )  Assessment & Plan  · Evidenced by liver countor on us- with thrombocytopenia   · Unknown etiology   · Does not drink alcohol no drugs  · Has not seen physician in 20 years   · inr normal   · Chronic hep panel pending   · ?cardiac cirrhosis   · Has been diuresed currently on hold today with slight cr elevation and contraction alkalosis   · Will GI eval as well on opinion of etiology /recs- will have to follow-up as an outpatient  · Will check albumin -low -    Vitamin B12 deficiency  Assessment & Plan  · Level at her age should be >400 , jwill start/continue  oral 1000mcg po daily     Thrombocytopenia ([facility] )  Assessment & Plan  · Has not seen a physician in 20 years  · Evaluated by heme onc- most likley chronic process  · Improved will place on lovenox at 30mg daily for dvt prophylaxis and monitor   · Further work up ordered by [facility] on c  · Liver us reviewed looks like has liver cirrhosis - suspect thrombocytopenia 2/2 liver disease  · No bleeding   · Hep panel -negand and hiv neg   · Will follow outpatient with heme onc  · Vitamin b12 supplemented     Acute anemia  Assessment & Plan  · Patient has not seen a physician in 20 years  Her hemoglobin is 9  Will check anemia profile vitamin B12 - low for her age <400 start oral suppl /folic acid    Will check Hemoccult- patient reports no melena or hematochezia- still pending    · Iron studies -> iron deficiency   · Evaluated by heme onc as well  · venofer for 2 doses given   · Hb stable as today monitor as on aspirin   · retic >2-ldh negative   · Will follow up with heme onc as outpatient   · Also asked gi to see - evaluated by GI  Still Hemoccult have not been done hemoglobin has been stable she declined any endoscopic evaluation  · RF is elevated YVETTE is still pending I will get an anti CCP  · She has abnormal keppa/sushila chain elevated ? MM will discuss with heme onc tiger texted I discussed with the hematology oncology may be secondary to kidney disease they will Older more blood work and decide if she needs a bone marrow biopsy as an outpatient  · Continue iron and  add colace daily   · Hemoglobin stable does not drop lower than being in the 8    Troponin I above reference range  Assessment & Plan  · Non MI elevation->     Acute diastolic heart failure (HCC)  Assessment & Plan  Wt Readings from Last 3 Encounters:   03/16/20 110 kg (243 lb 9 7 oz)   echo - normal ef 14%, normal systolic right sided function   She does have mild -moderate MR,TR and pulmonary hypertension weight stable she is euvolemic  Creatinine trending down slowly when back to baseline place her on low-dose p o  Lasix   good urine out put     evaluated by cardiology as well  Continue asa   Repeat probnp in am trending down   Also cxr better   Continue 1500 ml restriction   Will obtain vbg- reviewed contraction   Bicarb today improved   If creatinine continues to improve tomorrow while at transitional care facility I will follow up and start her on low-dose diuretic  * Acute respiratory failure with hypoxia (HCC)  Assessment & Plan  · Secondary to new onset heart failure exacerbation - acute diastolic heart failure- normal ef- she does have pulmonary hypertension   improving- with diuresis   Will continue ttitrating down the oxygen requiring only 1 L of oxygen  · No clinical evidence of pneumonia with procalcitonin x2 being negative  · Also has atelectasis throughout the lungs  Discharging Physician / Practitioner: Nora Johnson MD  PCP: No primary care provider on file  Admission Date:   Admission Orders (From admission, onward)     Ordered        03/10/20 1703  Inpatient Admission  Once                   Discharge Date: 03/16/20    Resolved Problems  Date Reviewed: 3/16/2020    None          Consultations During Hospital Stay:  · Gastroenterology  · Cardiology  · Hematology-Oncology    Procedures Performed:     · None    Significant Findings / Test Results:     · Chest x-ray on the 16km-Zzhvjwz-yjcbdpjpaww density at the bases as well as perihilar infiltrate  Pneumonia is possible      Follow-up PA and lateral film is advised better assess the hilar region  · CT chest-Small right upper lobe groundglass alveolar infiltrate      Multilobar subsegmental atelectasis, left greater than right      Small to moderate layering bilateral pleural effusions, left greater than right      Cardiomegaly  No pericardial effusion      Equivocal nodular liver surface  Consider hepatic cirrhosis in the appropriate clinical context      Mild geraldine hepatic adenopathy may be sequela of chronic hepatocellular disease  Consider follow-up contrast enhanced CT of the abdomen and pelvis      Cholelithiasis  · Venous duplex is negative for DVT  · Ultrasound of the liver- Exam limited by patient body habitus and inability to cooperate with exam      Liver does appear mildly cirrhotic  Main portal vein not convincingly identified  Although a presumed main portal vein demonstrates normal direction of flow      Right pleural effusion is seen      Gallbladder is contracted around multiple gallstones, gallbladder wall is subsequently thickened likely due to the underdistention    No sonographic Ariana Cellar sign reported      Normal caliber common bile duct      Limited evaluation of right kidney which appeared grossly normal      Pancreatic tail obscured by overlying bowel gas      Previous chest CT report suggested consideration of contrast-enhanced CT of the abdomen and pelvis  Which is advisable if thorough evaluation is desired      The study was marked in EPIC for immediate notification  · cxr-  Suboptimal inspiration with small bilateral pleural effusions and probable bibasilar atelectasis versus pneumonia  2   Cardiomegaly  Incidental Findings:   · See above     Test Results Pending at Discharge (will require follow up): · None     Outpatient Tests Requested:  · BMP to be done tomorrow    Complications:  None    Reason for Admission:  Cough and shortness of breath    Hospital Course:     Raymond Figueroa is a 75 y o  female patient who originally presented to the hospital on 3/10/2020 due to acute CHF exacerbation diastolic with pulmonary hypertension  Patient has not seen a physician in 20 years not having any medical issues that she knows of or being on any medications she was found to have acute heart failure with a suspicion initially of pneumonia but she had no white count no fever procalcitonin x2 was negative and her cough is nonproductive most likely secondary to CHF and atelectasis  She diuresed very well with good weight loss good urine output with resolution of leg edema and lung auscultation improvement secondary to diuresis she did run into a little bit of Andre I  On the day of discharge her creatinine function improved to 1 3 on admission was 1 08 I will monitor on tcf if renal function continues to drop more I will place her on Lasix 20 mg p o  Daily    In terms of her anemia isn't santino bicytopenia she does have vitamin B12 deficiency which I start supplemented her also she had an abnormal kappa lambda ratio could be secondary to kidney disease versus multiple myeloma I discussed with hematology oncology evaluated the patient able decided to proceed with bone marrow biopsy as an outpatient  Her anemia Hemoccult was never done will continue to see if Hemoccult could be done at transitional care facility but she had no gross bleeding evaluated by GI as well she seems to have liver cirrhosis of unknown etiology but compensated she needs to follow-up with physicians and establish care when discharged  Her oxygen was titrated to 1 L and to continue to try trait while at transitional care facility otherwise she is medically clear to be discharged there hemoglobin is stable  She did receive 2 units of Venofer transfusion and placed on iron p o  Please see above list of diagnoses and related plan for additional information  Condition at Discharge: stable     Discharge Day Visit / Exam:     * Please refer to separate progress note for these details *    Discussion with Family: daughter     Discharge instructions/Information to patient and family:   See after visit summary for information provided to patient and family  Provisions for Follow-Up Care:  See after visit summary for information related to follow-up care and any pertinent home health orders  Disposition:     [facility] at [address]:   · Not Applicable to this Patient - Not Applicable to this Patient    Planned Readmission: no      Discharge Statement:  I spent >35 minutes discharging the patient  This time was spent on the day of discharge  I had direct contact with the patient on the day of discharge  Greater than 50% of the total time was spent examining patient, answering all patient questions, arranging and discussing plan of care with patient as well as directly providing post-discharge instructions  Additional time then spent on discharge activities  Discharge Medications:  See after visit summary for reconciled discharge medications provided to patient and family        ** Please Note: This note has been constructed using a voice recognition system **

## 2020-03-16 NOTE — PLAN OF CARE
Problem: PHYSICAL THERAPY ADULT  Goal: Performs mobility at highest level of function for planned discharge setting  See evaluation for individualized goals  Description  Treatment/Interventions: Functional transfer training, LE strengthening/ROM, Elevations, Therapeutic exercise, Endurance training, Patient/family training, Equipment eval/education, Bed mobility, Gait training, Spoke to nursing, Spoke to case management  Equipment Recommended: Jayesh Speaker       See flowsheet documentation for full assessment, interventions and recommendations  Outcome: Progressing  Note:   Prognosis: Fair  Problem List: Decreased strength, Decreased range of motion, Decreased endurance, Impaired balance, Decreased mobility, Decreased coordination, Obesity, Impaired sensation  Assessment: Pt seen today for PT treatement  Found seated EOB alert and agreeable to therapy  She was able to ambulate short household distances with min A today, decreased balance noted although most limited by fatigue and KNOX/SOB  On 1 L NC O2 she was able to maintain above 90% at all times after ambulation trials and while standing  She was able to remain standing for long enough to complete oral hygiene but with UE support on sink and with seated rest break immediately after  At end of treatment pt left seated at EOB as she reports increased comfort here vs recliner  Barriers to Discharge: Inaccessible home environment     Recommendation: (inpt rehab)     PT - OK to Discharge: Yes    See flowsheet documentation for full assessment

## 2020-03-16 NOTE — PROGRESS NOTES
Patient Name: Phuong Rodriguez  Patient MRN: [id number]  Date: 03/16/20  Service: [facility]    Subjective   Phuong Rodriguez is a 75 y o  female who was admitted with Acute respiratory failure with hypoxia ([address] )  She was seen earlier in the admission for nodular appearing liver suggestive of cirrhosis  This may also explain the patient's reduced platelet count  Workup to date has been negative including primary biliary cholangitis and chronic viral hepatitis  Vitals  Blood pressure 136/72, pulse 80, temperature (!) 97 2 °F (36 2 °C), temperature source Temporal, resp  rate 18, height 5' 1 5" (1 562 m), weight 110 kg (243 lb 9 7 oz), SpO2 95 %, not currently breastfeeding  Physical Exam  Chronically ill-appearing  She remains on supplemental oxygen    Morbidly obese  No distress  No icterus or jaundice    Laboratory Studies  Lab Results   Component Value Date    CREATININE 1 36 (H) 03/16/2020    BUN 25 03/16/2020    SODIUM 138 03/16/2020    K 4 0 03/16/2020    CL 99 03/16/2020    CO2 36 (H) 03/16/2020    CALCIUM 9 4 03/16/2020    ALKPHOS 105 03/13/2020    AST 48 (H) 03/13/2020    ALT 21 03/13/2020     Lab Results   Component Value Date    WBC 5 40 03/16/2020    HGB 8 3 (L) 03/16/2020    HCT 26 2 (L) 03/16/2020     (L) 03/16/2020    MCV 90 03/16/2020     Lab Results   Component Value Date    PROTIME 12 1 (H) 03/11/2020    INR 1 09 03/11/2020     No results found for: CDIFF    Imaging and Other Studies  Ultrasound and CT scans reviewed    Inhouse Medications       Current Facility-Administered Medications:     aspirin chewable tablet 81 mg, 81 mg, Oral, Daily, 81 mg at 03/16/20 0814    benzonatate (TESSALON PERLES) capsule 100 mg, 100 mg, Oral, TID PRN, 100 mg at 03/11/20 1803    cyanocobalamin (VITAMIN B-12) tablet 1,000 mcg, 1,000 mcg, Oral, Daily, 1,000 mcg at 03/16/20 0814    dextromethorphan-guaiFENesin (ROBITUSSIN DM)  mg/5 mL oral syrup 10 mL, 10 mL, Oral, Q4H PRN, 10 mL at 03/14/20 1148    docusate sodium (COLACE) capsule 100 mg, 100 mg, Oral, Daily, 100 mg at 03/15/20 0934    enoxaparin (LOVENOX) subcutaneous injection 30 mg, 30 mg, Subcutaneous, Q24H FEMI, 30 mg at 03/16/20 0814    ferrous gluconate (FERGON) tablet 324 mg, 324 mg, Oral, Daily Before Breakfast, 324 mg at 49/29/63 0705    folic acid (FOLVITE) tablet 1 mg, 1 mg, Oral, Daily, 1 mg at 03/16/20 0814    guaiFENesin (MUCINEX) 12 hr tablet 600 mg, 600 mg, Oral, Q12H [facility], 600 mg at 03/15/20 2108    ipratropium-albuterol (DUO-NEB) 0 5-2 5 mg/3 mL inhalation solution 3 mL, 3 mL, Nebulization, Q6H PRN, 3 mL at 03/11/20 0824    metoprolol tartrate (LOPRESSOR) partial tablet 12 5 mg, 12 5 mg, Oral, Q12H FEMI, 12 5 mg at 03/16/20 0814    nitroglycerin (NITROSTAT) SL tablet 0 4 mg, 0 4 mg, Sublingual, Q5 Min PRN    nystatin (MYCOSTATIN) powder, , Topical, BID      Assessment/Plan:  Assessment:  1  Patient remains with multiple other chronic medical related issues including hypoxia and congestive heart failure along with morbid obesity  The imaging studies are concerning for well-compensated cirrhosis with nodular contour  In this setting, metabolic syndrome/fatty liver may be the most common cause  Plan:  1  From GI perspective she remains at reasonably high risk for elective procedures particularly for upper endoscopy  Ferritin level 14, will need to be monitored  Endoscopic evaluation could be considered as an outpatient to screen for varices and also for colonoscopic evaluation given marginal iron stores  2  Will check alpha fetoprotein as baseline  3  Will ask patient to follow up as an outpatient in our office to further guide therapy and workup if necessary    4  Will sign off but be available of other problems or questions arise      Marianne Loera MD

## 2020-03-16 NOTE — ASSESSMENT & PLAN NOTE
· Patient has not seen a physician in 20 years  Her hemoglobin is 9  Will check anemia profile vitamin B12 - low for her age <400 start oral suppl /folic acid  Will check Hemoccult- patient reports no melena or hematochezia- still pending    · Iron studies -> iron deficiency   · Evaluated by heme onc as well  · venofer for 2 doses given   · Hb stable as today monitor as on aspirin   · retic >2-ldh negative   · Will follow up with heme onc as outpatient   · Also asked gi to see - evaluated by GI  Still Hemoccult have not been done hemoglobin has been stable she declined any endoscopic evaluation  · RF is elevated YVETTE is still pending I will get an anti CCP  · She has abnormal keppa/sushila chain elevated ? MM will discuss with heme onc tiger texted I discussed with the hematology oncology may be secondary to kidney disease they will Older more blood work and decide if she needs a bone marrow biopsy as an outpatient    · Continue iron and  add colace daily   · Hemoglobin stable does not drop lower than being in the 8

## 2020-03-16 NOTE — SOCIAL WORK
LOS: 6 GLMOS: none    SW met w/ pt to present w/ and explain IMM #2  Pt gave verbal understanding, had no questions or concerns  Pt signed IMM #2  SW provided pt w/ copy and placed original in scan bin to be scanned into EHR

## 2020-03-18 NOTE — TELEPHONE ENCOUNTER
Patient's daughter, Zamzam Ramos, called to see if the results were in for the xray patient had done on 3/17  Sedrick can be reached at [phone]

## 2020-03-21 PROBLEM — L60.2 ONYCHOGRYPHOSIS: Status: ACTIVE | Noted: 2020-01-01

## 2020-03-26 PROBLEM — N18.30 STAGE 3 CHRONIC KIDNEY DISEASE (HCC): Status: ACTIVE | Noted: 2020-01-01

## 2020-03-31 PROBLEM — J96.21 ACUTE ON CHRONIC RESPIRATORY FAILURE WITH HYPOXIA (HCC): Status: ACTIVE | Noted: 2020-01-01

## 2020-05-16 PROBLEM — I95.9 HYPOTENSION: Status: ACTIVE | Noted: 2020-01-01

## 2020-05-16 PROBLEM — I48.91 ATRIAL FIBRILLATION (HCC): Status: ACTIVE | Noted: 2020-01-01

## 2020-05-23 PROBLEM — I50.33 ACUTE ON CHRONIC DIASTOLIC HEART FAILURE (HCC): Status: ACTIVE | Noted: 2020-01-01

## 2020-07-04 PROBLEM — L03.90 CELLULITIS: Status: ACTIVE | Noted: 2020-01-01

## 2020-07-04 NOTE — ED NOTES
After receiving verbal consent from pt  I updated daughter about pt care        Brooke Garza RN  07/04/20 2392

## 2020-07-04 NOTE — ED PROVIDER NOTES
History  Chief Complaint   Patient presents with    Shortness of Breath     pt reports have trouble breathing which started this morning  75y F biba from home for trouble breathing  Pt is a poor historian - in part due to her respiratory distress, but does frequently answer "I don't know" to questions  Reports feeling at baseline yesterday and today w/ worsening sob/silva  Upon EMS arrival, pt found in severe respiratory distress, hypoxic, hypertensive  Placed immed on CPAP and brought to ED for evaluation    No reported f/c/s, no changes in meds  Reports taking meds as prescribed  No abd pain, no n/v/d, no changes in urination  On exam noted to be grossly volume overloaded w/ pitting edema into the abd/breast region  Has a large reddened plaque on the left LE - pt unable to say if new or chronic  History provided by:  Patient and EMS personnel  History limited by:  Severe respiratory distress   used: No    Shortness of Breath   Severity:  Severe  Onset quality:  Sudden  Timing:  Constant  Progression:  Worsening  Chronicity:  New  Context: not URI    Relieved by:  Nothing  Worsened by: Activity, exertion and movement  Ineffective treatments:  None tried  Associated symptoms: cough and wheezing    Associated symptoms: no fever and no vomiting    Risk factors: obesity        Prior to Admission Medications   Prescriptions Last Dose Informant Patient Reported? Taking?    apixaban (ELIQUIS) 5 mg   No No   Sig: Take 1 tablet (5 mg total) by mouth 2 (two) times a day   aspirin 81 mg chewable tablet   No No   Sig: Chew 1 tablet (81 mg total) daily   cyanocobalamin (VITAMIN B-12) 1000 MCG tablet   No No   Sig: Take 1 tablet (1,000 mcg total) by mouth daily   ferrous gluconate (FERGON) 324 mg tablet   No No   Sig: Take 1 tablet (324 mg total) by mouth daily before breakfast   folic acid (FOLVITE) 1 mg tablet   No No   Sig: Take 1 tablet (1 mg total) by mouth daily   furosemide (LASIX) 40 mg tablet   No No   Sig: Take 1 tablet (40 mg total) by mouth daily   metoprolol succinate (TOPROL-XL) 50 mg 24 hr tablet   No No   Sig: Take 1 tablet (50 mg total) by mouth daily   nitroglycerin (NITROSTAT) 0 4 mg SL tablet   No No   Sig: Place 1 tablet (0 4 mg total) under the tongue every 5 (five) minutes as needed for chest pain      Facility-Administered Medications: None       Past Medical History:   Diagnosis Date    Atrial fibrillation (HCC)     Chronic anemia     CKD (chronic kidney disease)     Diastolic CHF ([facility] [address] )        Past Surgical History:   Procedure Laterality Date    APPENDECTOMY      HYSTERECTOMY      TONSILLECTOMY         No family history on file  I have reviewed and agree with the history as documented  E-Cigarette/Vaping    E-Cigarette Use Never User      E-Cigarette/Vaping Substances    Nicotine No     THC No     CBD No     Flavoring No     Other No     Unknown No      Social History     Tobacco Use    Smoking status: Former Smoker    Smokeless tobacco: Never Used   Substance Use Topics    Alcohol use: Not Currently     Alcohol/week: 1 0 standard drinks     Types: 1 Shots of liquor per week     Frequency: Monthly or less     Drinks per session: 1 or 2     Binge frequency: Never    Drug use: Never       Review of Systems   Unable to perform ROS: Severe respiratory distress   Constitutional: Negative for fever  Respiratory: Positive for cough, shortness of breath and wheezing  Gastrointestinal: Negative for vomiting  Physical Exam  Physical Exam   Constitutional: She appears well-developed and well-nourished  She appears distressed  Morbidly obese w/ BMI 57 11   HENT:   Nose: Nose normal    Mouth/Throat: Oropharynx is clear and moist    Eyes: Conjunctivae are normal    Neck: Neck supple  Exam limited due to body habitus   Cardiovascular: Normal rate and regular rhythm  Exam limited due to body habitus   Pulmonary/Chest: Accessory muscle usage present  Tachypnea noted  She is in respiratory distress  She has decreased breath sounds  She has wheezes  She has rales  Exam limited due to body habitus    Able to speak in only 1-2 word sentences   Abdominal: There is no tenderness  Exam limited due to body habitus   Musculoskeletal: She exhibits edema (pitting edema noted from feet up to abd/breast region)  Neurological: She is alert  Skin: There is pallor  Exam limited due to body habitus   Psychiatric: Her mood appears anxious  Nursing note and vitals reviewed        Vital Signs  ED Triage Vitals   Temperature Pulse Respirations Blood Pressure SpO2   07/04/20 1721 07/04/20 1716 07/04/20 1800 07/04/20 1716 07/04/20 1716   (!) 97 1 °F (36 2 °C) (!) 117 (!) 28 (!) 183/115 96 %      Temp Source Heart Rate Source Patient Position - Orthostatic VS BP Location FiO2 (%)   07/04/20 1721 07/04/20 1716 07/04/20 1716 07/04/20 1716 --   Temporal Monitor Lying Right arm       Pain Score       07/04/20 2049       No Pain           Vitals:    07/05/20 1156 07/05/20 1257 07/05/20 1419 07/05/20 1502   BP: (!) 81/69 (!) 89/68 91/57 95/52   Pulse: (!) 116 (!) 108 (!) 108 105   Patient Position - Orthostatic VS: Sitting Lying Lying Lying         Visual Acuity      ED Medications  Medications   nitroglycerin (NITROSTAT) SL tablet 0 4 mg (has no administration in time range)   folic acid (FOLVITE) tablet 1 mg (1 mg Oral Given 7/5/20 0958)   ferrous gluconate (FERGON) tablet 324 mg (324 mg Oral Given 7/5/20 0646)   cyanocobalamin (VITAMIN B-12) tablet 1,000 mcg (1,000 mcg Oral Given 7/5/20 0958)   ondansetron (ZOFRAN) injection 4 mg (has no administration in time range)   senna (SENOKOT) tablet 8 6 mg (has no administration in time range)   calcium carbonate (TUMS) chewable tablet 1,000 mg (has no administration in time range)   acetaminophen (TYLENOL) tablet 650 mg (has no administration in time range)   nystatin (MYCOSTATIN) powder ( Topical Given 7/5/20 1380)   ceFAZolin (ANCEF) IVPB (premix) 1,000 mg 50 mL (1,000 mg Intravenous New Bag 7/5/20 1217)   metoprolol succinate (TOPROL-XL) 24 hr tablet 25 mg (25 mg Oral Not Given 7/5/20 1000)   potassium chloride (K-DUR,KLOR-CON) CR tablet 20 mEq (20 mEq Oral Given 7/5/20 1256)   apixaban (ELIQUIS) tablet 5 mg (5 mg Oral Given 7/5/20 1256)   furosemide (LASIX) 500 mg infusion 50 mL (10 mg/hr Intravenous New Bag 7/5/20 1149)   amiodarone (CORDARONE) 900 mg in dextrose 5 % 500 mL infusion (1 mg/min Intravenous New Bag 7/5/20 1302)     Followed by   amiodarone (CORDARONE) 900 mg in dextrose 5 % 500 mL infusion (has no administration in time range)   guaiFENesin (ROBITUSSIN) oral solution 200 mg (has no administration in time range)   nitroglycerin (NITRO-BID) 2 % TD ointment 1 inch (1 inch Topical Given 7/4/20 1758)   furosemide (LASIX) injection 80 mg (80 mg Intravenous Given 7/4/20 1757)   amiodarone 150 mg in dextrose 5 % 100 mL IV bolus (0 mg Intravenous Stopped 7/5/20 1254)       Diagnostic Studies  Results Reviewed     Procedure Component Value Units Date/Time    Blood culture #1 [[id number]] Collected:  07/04/20 1728    Lab Status:  Preliminary result Specimen:  Blood from Arm, Left Updated:  07/05/20 0002     Blood Culture Received in Microbiology Lab  Culture in Progress  Blood culture #2 [[id number]] Collected:  07/04/20 1715    Lab Status:  Preliminary result Specimen:  Blood from Arm, Right Updated:  07/05/20 0002     Blood Culture Received in Microbiology Lab  Culture in Progress  Procalcitonin [[id number]]  (Normal) Collected:  07/04/20 1729    Lab Status:  Final result Specimen:  Blood from Arm, Right Updated:  07/04/20 2226     Procalcitonin 0 08 ng/ml     Lactic acid 2 Hours [[id number]]  (Normal) Collected:  07/04/20 1918    Lab Status:  Final result Specimen:  Blood from Arm, Right Updated:  07/04/20 1950     LACTIC ACID 1 5 mmol/L     Narrative:       Result may be elevated if tourniquet was used during collection  Novel Coronavirus [facility] [[id number]]  (Normal) Collected:  07/04/20 1734    Lab Status:  Final result Specimen:  Nares from Nose Updated:  07/04/20 1853     SARS-CoV-2 Negative    Narrative: The specimen collection materials, transport medium, and/or testing methodology utilized in the production of these test results have been proven to be reliable in a limited validation with an abbreviated program under the Emergency Utilization Authorization provided by the FDA  Testing reported as "Presumptive positive" will be confirmed with secondary testing with a reference laboratory to ensure result accuracy  Clinical caution and judgement should be used with the interpretation of these results with consideration of the clinical impression and other laboratory testing  Testing reported as "Positive" or "Negative" has been proven to be accurate according to standard laboratory validation requirements  All testing is performed with control materials showing appropriate reactivity at standard intervals  Protime-INR [[id number]]  (Normal) Collected:  07/04/20 1715    Lab Status:  Final result Specimen:  Blood from Arm, Right Updated:  07/04/20 1845     Protime 14 2 seconds      INR 1 09    APTT [[id number]]  (Normal) Collected:  07/04/20 1715    Lab Status:  Final result Specimen:  Blood from Arm, Right Updated:  07/04/20 1845     PTT 34 seconds     NT-BNP PRO [[id number]]  (Abnormal) Collected:  07/04/20 1732    Lab Status:  Final result Specimen:  Blood from Arm, Left Updated:  07/04/20 1843     NT-proBNP 8,210 pg/mL     Lactic acid [[id number]]  (Abnormal) Collected:  07/04/20 1729    Lab Status:  Final result Specimen:  Blood from Arm, Right Updated:  07/04/20 1835     LACTIC ACID 2 5 mmol/L     Narrative:       Result may be elevated if tourniquet was used during collection      Troponin I [[id number]]  (Abnormal) Collected:  07/04/20 1729    Lab Status:  Final result Specimen:  Blood from Arm, Right Updated:  07/04/20 1818     Troponin I 0 16 ng/mL     Comprehensive metabolic panel [[id number]]  (Abnormal) Collected:  07/04/20 1732    Lab Status:  Final result Specimen:  Blood from Arm, Left Updated:  07/04/20 1758     Sodium 140 mmol/L      Potassium 3 8 mmol/L      Chloride 104 mmol/L      CO2 32 mmol/L      ANION GAP 4 mmol/L      BUN 14 mg/dL      Creatinine 1 38 mg/dL      Glucose 140 mg/dL      Calcium 8 8 mg/dL      AST 38 U/L      ALT 19 U/L      Alkaline Phosphatase 166 U/L      Total Protein 7 0 g/dL      Albumin 2 6 g/dL      Total Bilirubin 1 63 mg/dL      eGFR 37 ml/min/1 73sq m     Narrative:       National Kidney Disease Foundation guidelines for Chronic Kidney Disease (CKD):     Stage 1 with normal or high GFR (GFR > 90 mL/min/1 73 square meters)    Stage 2 Mild CKD (GFR = 60-89 mL/min/1 73 square meters)    Stage 3A Moderate CKD (GFR = 45-59 mL/min/1 73 square meters)    Stage 3B Moderate CKD (GFR = 30-44 mL/min/1 73 square meters)    Stage 4 Severe CKD (GFR = 15-29 mL/min/1 73 square meters)    Stage 5 End Stage CKD (GFR <15 mL/min/1 73 square meters)  Note: GFR calculation is accurate only with a steady state creatinine    CBC and differential [[id number]]  (Abnormal) Collected:  07/04/20 1729    Lab Status:  Final result Specimen:  Blood from Arm, Right Updated:  07/04/20 1741     WBC 13 84 Thousand/uL      RBC 3 16 Million/uL      Hemoglobin 10 1 g/dL      Hematocrit 33 4 %       fL      MCH 32 0 pg      MCHC 30 2 g/dL      RDW 16 8 %      MPV 10 7 fL      Platelets 406 Thousands/uL      nRBC 0 /100 WBCs      Neutrophils Relative 40 %      Immat GRANS % 0 %      Lymphocytes Relative 47 %      Monocytes Relative 10 %      Eosinophils Relative 2 %      Basophils Relative 1 %      Neutrophils Absolute 5 49 Thousands/µL      Immature Grans Absolute 0 04 Thousand/uL      Lymphocytes Absolute 6 58 Thousands/µL      Monocytes Absolute 1 43 Thousand/µL      Eosinophils Absolute 0 23 Thousand/µL      Basophils Absolute 0 07 Thousands/µL                  XR chest portable - 1 view   ED Interpretation by Roney Pablo DO (07/04 2512)   Abnormal   Cardiomeg, increased markings/ CHF      Final Result by Christopher Smith MD (07/05 8882)      Cardiomegaly  CHF  Possible small right pleural effusion              Workstation performed: [id number]                    Procedures  ECG 12 Lead Documentation Only  Date/Time: 7/4/2020 5:36 PM  Performed by: Roney Pablo DO  Authorized by: Roney Pablo DO     ECG reviewed by me, the ED Provider: yes    Patient location:  ED  Interpretation:     Interpretation: non-specific    Rate:     ECG rate:  111    ECG rate assessment: tachycardic    Rhythm:     Rhythm: sinus tachycardia    Ectopy:     Ectopy: PVCs      PVCs:  Infrequent  QRS:     QRS axis:  Left  ST segments:     ST segments:  Non-specific  T waves:     T waves: non-specific    CriticalCare Time  Performed by: Roney Pablo DO  Authorized by: Roney Pablo DO     Critical care provider statement:     Critical care time (minutes):  35    Critical care time was exclusive of:  Separately billable procedures and treating other patients and teaching time    Critical care was necessary to treat or prevent imminent or life-threatening deterioration of the following conditions:  Respiratory failure and circulatory failure    Critical care was time spent personally by me on the following activities:  Blood draw for specimens, obtaining history from patient or surrogate, development of treatment plan with patient or surrogate, evaluation of patient's response to treatment, examination of patient, review of old charts, re-evaluation of patient's condition, ordering and review of radiographic studies, ordering and review of laboratory studies and ordering and performing treatments and interventions    I assumed direction of critical care for this patient from another provider in my specialty: no               ED Course  ED Course as of Jul 05 1620   Sat Jul 04, 2020   3027 Records reviewed - admitted from 5/16 - 5/24 for CHF, Rapid afib, NSTEMI (likely type 2)  Per notes, pt does not f/u w/ PCM and has no documented PCM  Unknown if pt has had any follow since discharge - none documented, so unclear if pt has been taking any of her medications      1843 Up from discharge BNP of 5K   NT-proBNP(!): 8,210   1859 EXT SARS-COV-2: Negative   1859 Do not feel lactic due to Infectious etiology at this point  Will treat for CHF exacerbation and admit  LACTIC ACID(!!): 2 5       US AUDIT      Most Recent Value   Initial Alcohol Screen: US AUDIT-C    1  How often do you have a drink containing alcohol?  0 Filed at: 07/04/2020 1719   2  How many drinks containing alcohol do you have on a typical day you are drinking? 0 Filed at: 07/04/2020 1719   3a  Male UNDER 65: How often do you have five or more drinks on one occasion? 0 Filed at: 07/04/2020 1719   3b  FEMALE Any Age, or MALE 65+: How often do you have 4 or more drinks on one occassion? 0 Filed at: 07/04/2020 1719   Audit-C Score  0 Filed at: 07/04/2020 1719                  CATALINA/DAST-10      Most Recent Value   How many times in the past year have you    Used an illegal drug or used a prescription medication for non-medical reasons? Never Filed at: 07/04/2020 1719                                MDM  Number of Diagnoses or Management Options  Acute exacerbation of CHF (congestive heart failure) ([facility] [address] ): new and requires workup  Acute respiratory distress: new and requires workup  Elevated troponin I level: new and requires workup  Hypoxia: new and requires workup  Diagnosis management comments: Dyspnea x1 day w/ hypoxia and distress  Per records pt non-complaint w/ outpt tx/fu  Afebrile, hypertensive, audible crackle and signs of volume overload  Most likely chf, but will ck for pna/covid, acs, renal failure    Will get labs, ekg, cxr, give nitro, lasix and re-eval   Will continue BiPAP       Amount and/or Complexity of Data Reviewed  Clinical lab tests: reviewed and ordered  Tests in the radiology section of CPT®: reviewed and ordered  Tests in the medicine section of CPT®: ordered and reviewed  Decide to obtain previous medical records or to obtain history from someone other than the patient: yes  Obtain history from someone other than the patient: yes  Independent visualization of images, tracings, or specimens: yes          Disposition  Final diagnoses:   Acute respiratory distress   Acute exacerbation of CHF (congestive heart failure) ([facility] )   Hypoxia   Elevated troponin I level     Time reflects when diagnosis was documented in both MDM as applicable and the Disposition within this note     Time User Action Codes Description Comment    7/4/2020  7:14 PM Yazmin Santana [R06 03] Acute respiratory distress     7/4/2020  7:14 PM Yazmin Santana [I50 9] Acute exacerbation of CHF (congestive heart failure) ([facility] )     7/4/2020  7:14 PM Yazmin Santana [R09 02] Hypoxia     7/4/2020  7:14 PM Max, [address] [place] [address] [R79 89] Elevated troponin I level       ED Disposition     ED Disposition Condition Date/Time Comment    Admit Stable Sat Jul 4, 2020  7:15 PM Case was discussed with [employer] and the patient's admission status was agreed to be Admission Status: inpatient status to the service of Dr Gwen Caro   Follow-up Information    None         Current Discharge Medication List      CONTINUE these medications which have NOT CHANGED    Details   apixaban (ELIQUIS) 5 mg Take 1 tablet (5 mg total) by mouth 2 (two) times a day  Qty: 60 tablet, Refills: 6    Associated Diagnoses: Atrial fibrillation (HCC)      aspirin 81 mg chewable tablet Chew 1 tablet (81 mg total) daily  Qty: 30 tablet, Refills: 0    Associated Diagnoses: Acute on chronic respiratory failure with hypoxia ([facility] );  Acute diastolic heart failure ([facility] ) cyanocobalamin (VITAMIN B-12) 1000 MCG tablet Take 1 tablet (1,000 mcg total) by mouth daily  Qty: 30 tablet, Refills: 0    Associated Diagnoses: Vitamin B12 deficiency      ferrous gluconate (FERGON) 324 mg tablet Take 1 tablet (324 mg total) by mouth daily before breakfast  Qty: 30 tablet, Refills: 0    Associated Diagnoses: Acute on chronic respiratory failure with hypoxia ([address] ); Acute diastolic heart failure (HCC)      folic acid (FOLVITE) 1 mg tablet Take 1 tablet (1 mg total) by mouth daily  Qty: 30 tablet, Refills: 0    Associated Diagnoses: Acute on chronic respiratory failure with hypoxia ([address] ); Acute diastolic heart failure (HCC)      furosemide (LASIX) 40 mg tablet Take 1 tablet (40 mg total) by mouth daily  Qty: 30 tablet, Refills: 0    Associated Diagnoses: Acute on chronic respiratory failure with hypoxia ([facility] [address] ); Acute diastolic heart failure (HCC)      metoprolol succinate (TOPROL-XL) 50 mg 24 hr tablet Take 1 tablet (50 mg total) by mouth daily  Qty: 30 tablet, Refills: 6    Associated Diagnoses: Atrial fibrillation (HCC)      nitroglycerin (NITROSTAT) 0 4 mg SL tablet Place 1 tablet (0 4 mg total) under the tongue every 5 (five) minutes as needed for chest pain  Qty: 20 tablet, Refills: 0    Associated Diagnoses: Acute on chronic respiratory failure with hypoxia ([facility] [address] ); Acute diastolic heart failure ([facility] [address] )           No discharge procedures on file      PDMP Review     None          ED Provider  Electronically Signed by           Jostin Anaya DO  07/05/20 1624

## 2020-07-05 PROBLEM — J96.22 ACUTE ON CHRONIC RESPIRATORY FAILURE WITH HYPOXIA AND HYPERCAPNIA (HCC): Status: ACTIVE | Noted: 2020-01-01

## 2020-07-05 NOTE — ASSESSMENT & PLAN NOTE
History of AFib, currently in sinus rhythm  Continue rate control with metoprolol  Continue Eliquis  Consult to case management as family reports they cannot afford this medication and just ran out of free trial month

## 2020-07-05 NOTE — NURSING NOTE
Patient lost IV access this am after IV dose of Lasix given  Multiple attempts made, finally obtained access at this time  Patient to be started in Lasix gtt per Cardiology  Patient tachypnic with resp rate =32-36 bpm on 6L NC  and now tachycardic 130-150s  RN asked Dr Robi Keene with cardiology to check tele with me and confirms patient is now in an Atrial Fibrillation with RVR  Dr Chiquita Chicas made aware  Dr Chiquita Chicas at bedside  BiPap placed back on patient  Requested Lasix gtt from pharmacy  RN will continue to monitor closely

## 2020-07-05 NOTE — ASSESSMENT & PLAN NOTE
Creatinine stable at 1 43, baseline 1 2-1 6  Avoid nephrotoxins and hypotension  Monitor while on IV Lasix

## 2020-07-05 NOTE — ASSESSMENT & PLAN NOTE
Troponin elevated to 0 16 at time of presentation  Likely type 2 MI secondary to pulmonary edema and demand  Trend to peak  Cardiology consult  EKG without acute ST changes  Monitor on telemetry

## 2020-07-05 NOTE — ASSESSMENT & PLAN NOTE
Wt Readings from Last 3 Encounters:   07/05/20 135 kg (298 lb 4 5 oz)   05/24/20 115 kg (252 lb 10 4 oz)   04/25/20 111 kg (243 lb 11 2 oz)     Patient presented to ED in respiratory distress  Placed on BiPAP with improvement in work of breathing  Given 80 mg IV Lasix and nitroglycerin  Chest x-ray with significant interstitial edema  Weight up 50 lb since discharge in May, monitor daily weights  Echo showing EF of 55% with grade 2 diastolic dysfunction, mild to moderate mitral and tricuspid regurg  Cardiology consultation appreciated, patient started on Lasix drip  Intake and output, fluid restriction, salt restriction

## 2020-07-05 NOTE — ASSESSMENT & PLAN NOTE
Troponin elevated to 0 16 at time of presentation  Likely type 2 MI secondary to pulmonary edema and demand  Trend to peak  Cardiology consult appreciated  EKG without acute ST changes  Monitor on telemetry

## 2020-07-05 NOTE — SOCIAL WORK
CM received consult re: pt's inability to afford Eliquis (30 day free trial completed)- inquired with cardiology re: same and informed could change to coumadin at time of d/c  Called [facility] who clarified pt without prescription coverage and using coupon cards alone  Financial Counselors notified of need for PATHS to see pt for possible MA application for better OP care  Will continue to follow up to assist with dc poc

## 2020-07-05 NOTE — PROGRESS NOTES
Critical care and Dr Fara Alegria aware and at bedside, family notified  Patient will continue to be monitored and to maintain MAP of 65  Current MAP=74  Patient still in Afib RVR UO=790  Awaiting orders for HR control        07/05/20 1156   Vital Signs   Temperature (!) 94 8 °F (34 9 °C)   Pulse (!) 116   Respirations (!) 24  (on Bipap)   Blood Pressure (!) 81/69   BP Location Left arm   BP Method Automatic   Patient Position - Orthostatic VS Sitting

## 2020-07-05 NOTE — ASSESSMENT & PLAN NOTE
Wt Readings from Last 3 Encounters:   07/04/20 (!) 137 kg (302 lb 4 oz)   05/24/20 115 kg (252 lb 10 4 oz)   04/25/20 111 kg (243 lb 11 2 oz)     Patient presented to ED in respiratory distress  Placed on BiPAP with improvement in work of breathing  Given 80 mg IV Lasix and nitroglycerin  Chest x-ray with significant interstitial edema  Notes worsening edema and shortness of breath for the past few days, but notes this episode came on suddenly  Weight up 50 lb since discharge in May, monitor daily weights  Echo showing EF of 55% with grade 2 diastolic dysfunction, mild to moderate mitral and tricuspid regurg  Intake and output, fluid restriction, salt restriction  Initiate 40 mg IV Lasix b i d , home regimen 40 mg p o   Lasix daily  Monitor step-down level to status with continuous cardiopulmonary monitoring  Continue BiPAP, wean to nasal cannula as tolerated, home oxygen at 2 L per patient  Cardiology consult

## 2020-07-05 NOTE — PROGRESS NOTES
Progress Note - Patsy Jarvis 1945, 75 y o  female MRN: [id number]    Unit/Bed#: E4 -01 Encounter: [id number]    Primary Care Provider: No primary care provider on file  Date and time admitted to hospital: 7/4/2020  5:10 PM        * Acute on chronic diastolic heart failure (HCC)  Assessment & Plan  Wt Readings from Last 3 Encounters:   07/05/20 135 kg (298 lb 4 5 oz)   05/24/20 115 kg (252 lb 10 4 oz)   04/25/20 111 kg (243 lb 11 2 oz)     Patient presented to ED in respiratory distress  Placed on BiPAP with improvement in work of breathing  Given 80 mg IV Lasix and nitroglycerin  Chest x-ray with significant interstitial edema  Weight up 50 lb since discharge in May, monitor daily weights  Echo showing EF of 55% with grade 2 diastolic dysfunction, mild to moderate mitral and tricuspid regurg  Cardiology consultation appreciated, patient started on Lasix drip  Intake and output, fluid restriction, salt restriction      Acute on chronic respiratory failure with hypoxia and hypercapnia (HCC)  Assessment & Plan  Multifactorial likely secondary to chronic hypercapnia given elevated bicarb on BMP possibly secondary to up obesity hypoventilation and CHF exacerbation  -patient on BiPAP  -critical care consultation appreciated  -patient currently awake alert and oriented, stating she is a DNR DNI however is okay with pressors if needed  -will maintain on BiPAP and monitor    Cellulitis  Assessment & Plan  Patient with increased erythema, warmth on left shin  Notes this just started yesterday  Concern for cellulitis  Started on IV Ancef, continue  Serial exams  Also with fungal appearing rash under breast folds-nystatin powder b i d      Atrial fibrillation [facility])  Assessment & Plan  Patient went into AFib with RVR  Will load with amiodarone and start amiodarone drip  Cardiology input appreciated  Continue Eliquis  Consult to case management as family reports they cannot afford this medication and just ran out of free trial month    Stage 3 chronic kidney disease (HCC)  Assessment & Plan  Creatinine stable at 1 43, baseline 1 2-1 6  Avoid nephrotoxins and hypotension  Monitor while on IV Lasix      Chronic anemia  Assessment & Plan  Hemoglobin stable at 10 1, baseline 8-9  Continue folate, iron and vitamin B12    Elevated troponin  Assessment & Plan  Troponin elevated to 0 16 at time of presentation  Likely type 2 MI secondary to pulmonary edema and demand  Trend to peak  Cardiology consult appreciated  EKG without acute ST changes  Monitor on telemetry          VTE Pharmacologic Prophylaxis:   Pharmacologic:  Eliquis    Patient Centered Rounds: I have performed bedside rounds with nursing staff today  Discussions with Specialists or Other Care Team Provider:  Critical care attending, Dr Maki Alarcon, cardiologist, Dr Freitas Later    Education and Discussions with Family / Patient:  Daughter, Yannick Reaves, at bedside    Time Spent for Care: 38 minutes  More than 50% of total time spent on counseling and coordination of care as described above  Current Length of Stay: 1 day(s)    Current Patient Status: Inpatient   Certification Statement: The patient will continue to require additional inpatient hospital stay due to Chronic hypoxic and hypercapnic respiratory failure, CHF exacerbation    Discharge Plan / Estimated Discharge Date:  TBD    Code Status: Level 3 - DNAR and DNI      Subjective:   Patient seen and examined at bedside, awake, alert, respiratory distress, denies any chest pain    Objective:     Vitals:   Temp (24hrs), Av 1 °F (36 2 °C), Min:94 8 °F (34 9 °C), Max:97 7 °F (36 5 °C)    Temp:  [94 8 °F (34 9 °C)-97 7 °F (36 5 °C)] 97 1 °F (36 2 °C)  HR:  [] 108  Resp:  [18-36] 28  BP: ()/() 91/57  SpO2:  [92 %-100 %] 94 %  Body mass index is 56 36 kg/m²  Input and Output Summary (last 24 hours):        Intake/Output Summary (Last 24 hours) at 2020 1430  Last data filed at 2020 1302  Gross per 24 hour   Intake 100 ml   Output 752 ml   Net -652 ml       Physical Exam:    Constitutional: Patient is oriented to person, place and time, no acute distress  HEENT:  Normocephalic, atraumatic  Cardiovascular: Normal S1S2, irregular, No murmurs/rubs/gallops appreciated  Pulmonary:  Bilateral air entry decreased at bases,  Abdominal: Soft, Bowel sounds present, Non-tender, Non-distended  Extremities:  Bilateral lower extremity pitting edema  Neurological: Cranial nerves II-XII grossly intact, sensation intact, otherwise no focal neurological symptoms  Additional Data:     Labs:    Results from last 7 days   Lab Units 07/05/20  1145   WBC Thousand/uL 6 96   HEMOGLOBIN g/dL 9 9*   HEMATOCRIT % 32 8*   PLATELETS Thousands/uL 141*   NEUTROS PCT % 68   LYMPHS PCT % 24   MONOS PCT % 7   EOS PCT % 1     Results from last 7 days   Lab Units 07/05/20  1145 07/04/20  1732   POTASSIUM mmol/L 4 3 3 8   CHLORIDE mmol/L 103 104   CO2 mmol/L 33* 32   BUN mg/dL 14 14   CREATININE mg/dL 1 43* 1 38*   CALCIUM mg/dL 8 8 8 8   ALK PHOS U/L  --  166*   ALT U/L  --  19   AST U/L  --  38     Results from last 7 days   Lab Units 07/04/20  1715   INR  1 09        I Have Reviewed All Lab Data Listed Above  Recent Cultures (last 7 days):     Results from last 7 days   Lab Units 07/04/20  1728 07/04/20  1715   BLOOD CULTURE  Received in Microbiology Lab  Culture in Progress  Received in Microbiology Lab  Culture in Progress         Last 24 Hours Medication List:     Current Facility-Administered Medications:  acetaminophen 650 mg Oral Q4H PRN aNno Lira PA-C    amiodarone 1 mg/min Intravenous Continuous Sandi Dye MD Last Rate: 1 mg/min (07/05/20 1302)   Followed by        amiodarone 0 5 mg/min Intravenous Continuous Sandi Dye MD    apixaban 5 mg Oral BID Rujul Will, DO    calcium carbonate 1,000 mg Oral TID PRN Junior Code CHRISTOPHER Baird    cefazolin 1,000 mg Intravenous Q8H Dorita Baird PA-C Last Rate: 1,000 mg (07/05/20 1217)   cyanocobalamin 1,000 mcg Oral Daily Droita Smudde, PA-C    ferrous gluconate 324 mg Oral Daily Before Breakfast Dorita Smudde, PA-C    folic acid 1 mg Oral Daily Dorita Smudde, PA-C    furosemide 10 mg/hr Intravenous Continuous Rujul Solis, DO Last Rate: 10 mg/hr (07/05/20 1149)   guaiFENesin 200 mg Oral Q4H PRN Rene Jhaveri MD    metoprolol succinate 25 mg Oral Daily Rujul Solis, DO    nitroglycerin 0 4 mg Sublingual Q5 Min PRN Dorita Smudde, PA-C    nystatin  Topical BID Dorita Smudde, PA-C    ondansetron 4 mg Intravenous Q6H PRN Dorita Smudde, PA-C    potassium chloride 20 mEq Oral TID With Meals Rujul Solis, DO    senna 1 tablet Oral HS PRN Roberth Dumont PA-C         Today, Patient Was Seen By: Rene Jhaveri MD

## 2020-07-05 NOTE — CONSULTS
Consultation - critical care Medicine   Basil Styles 75 y o  female MRN: [id number]  Unit/Bed#: E4 -01 Encounter: [id number]      Physician Requesting Consult: Linda Fields MD  Reason for Consult:  CHF and hypotension      Assessment:  1  Acute on chronic hypoxic and hypercapnic respiratory failure secondary to CHF  I suspect patient has chronic hypercapnia based on elevated bicarb on BMP and also an ABG and March 2020 showing 7 57/44/152  Currently on BiPAP for work of breathing but appears comfortable  2  Acute on chronic diastolic CHF with pulmonary edema and anasarca  3  Borderline hypotension with map 74 now, secondary to CHF and volume overload and atrial fibrillation  4  AFib with RVR in the 120's on home medical  5  Morbid obesity with possible underlying JESIKA/OHS  6  CKD stage 3 at baseline  7  Cellulitis of left leg on Ancef per primary team  8  Pulmonary hypertension most likely WHO group 2/3 (with possible JESIKA)  9  Stable anemia of chronic disease secondary to CKD  10  DNR/DNI    Plan:   · I had long discussion with patient, she is awake alert and oriented x3, she wants to stay DNR/DNI, if she gets worse she still does not want to be intubated and prefers to be made comfort care understanding that she will die than  But for now she is fine with aggressive therapy including central line and vasopressors  I spoke to her about central line and possible complications including bleeding, arterial puncture, infection and also lung perforation/pneumothorax and she verbalized understanding and agreed if needed  · For now will continue BiPAP for comfort and work of breathing and maintain oxygen titrate for pulse ox 88-92%  · Continue Lasix infusion as per Cardiology  · I suggest rate control by amiodarone 150 bolus now and possible drip and also to consider digoxin instead of Cardizem given hypotension  She is already anticoagulated with Eliquis    · Will continue to monitor on step-down level to for now, goal MAP > 65 and later if needed we can transfer to the ICU and placed central line and start vasopressors to continue management  · Avoid any sedatives  · Discussed in details with Internal Medicine attending      Critical care time 35 min not including teaching or procedures or family updates    ______________________________________________________________________    HPI:    Ritu Jhaveri is a 75 y o  female who presents with worsening shortness of breath  Patient has history of chronic diastolic CHF, atrial fibrillation on Eliquis, anemia and chronic kidney disease who presents with worsening shortness of breath for the past few days  She has chronic hypoxic respiratory failure on 1-2 liters/minute and now she was requiring 5 liters/minute at home  She denies any cough or sputum production, denies fever or chills or night sweats, denies wheezing, denies chest pain  She was started on BiPAP upon arrival to ER  She was admitted to the hospital with CHF a month ago was discharged on Lasix  She had some arrangement for home health care but she cancel that  Patient is noncompliant although she reports taking her medication  Patient is a former smoker, she quit smoking several years ago  She is not on any inhalers and no diagnosis of pulmonary disease  Review of Systems:  12 points Full review of systems was performed  Aside from what was mentioned in the HPI, it is otherwise negative      Historical Information   Past Medical History:   Diagnosis Date    Atrial fibrillation (HCC)     Chronic anemia     CKD (chronic kidney disease)     Diastolic CHF (HCC)      Past Surgical History:   Procedure Laterality Date    APPENDECTOMY      HYSTERECTOMY      TONSILLECTOMY       Social History   Social History     Substance and Sexual Activity   Alcohol Use Not Currently    Alcohol/week: 1 0 standard drinks    Types: 1 Shots of liquor per week    Frequency: Monthly or less    Drinks per session: 1 or 2    Binge frequency: Never     Social History     Tobacco Use   Smoking Status Former Smoker   Smokeless Tobacco Never Used       Occupational history:  No occupational exposure    Family History:   No family history on file  No pertinent family history, no significant pulmonary family history    Medications: The patient's active and prehospital medications were reviewed      Current Facility-Administered Medications:  acetaminophen 650 mg Oral Q4H PRN Nichole Baird PA-C    amiodarone 1 mg/min Intravenous Continuous Thu Elliott MD    Followed by        amiodarone 0 5 mg/min Intravenous Continuous Thu Elliott MD    amiodarone (CORDARONE) IV bolus 150 mg Intravenous Once Thu Elliott MD    apixaban 5 mg Oral BID Rujul Solis, DO    calcium carbonate 1,000 mg Oral TID PRN Gume Solomon PA-C    cefazolin 1,000 mg Intravenous Q8H Dorita Oscarde, PA-C Last Rate: 1,000 mg (07/05/20 1217)   cyanocobalamin 1,000 mcg Oral Daily Dorita Smudde, PA-C    ferrous gluconate 324 mg Oral Daily Before Breakfast Dorita Smudde, PA-C    folic acid 1 mg Oral Daily Dorita Smudde, PA-C    furosemide 10 mg/hr Intravenous Continuous Rujul Solis, DO Last Rate: 10 mg/hr (07/05/20 1149)   metoprolol succinate 25 mg Oral Daily Rujul Solis, DO    nitroglycerin 0 4 mg Sublingual Q5 Min PRN Dorita Smudde, PA-C    nystatin  Topical BID Dorita Smudde, PA-C    ondansetron 4 mg Intravenous Q6H PRN Dorita Smudde, PA-C    potassium chloride 20 mEq Oral TID With Meals Rujul Solis, DO    senna 1 tablet Oral HS PRN Dorita Smudde, PA-C          PhysicalExamination:  Vitals:   Vitals:    07/05/20 0700 07/05/20 0922 07/05/20 1153 07/05/20 1156   BP: 103/51  (!) 84/72 (!) 81/69   BP Location: Left arm  Left arm Left arm   Pulse: 66  89 (!) 116   Resp: 20 (!) 36 (!) 26 (!) 24   Temp: 97 5 °F (36 4 °C)   (!) 94 8 °F (34 9 °C)   TempSrc: Tympanic      SpO2: 94% 94%     Weight:       Height:         Temp  Min: 94 8 °F (34 9 °C)  Max: 97 7 °F (36 5 °C)  IBW: 47 8 kg    SpO2: 94 %,   SpO2 Activity: At Rest,   O2 Device: Nasal cannula    General: alert, not in acute distress but appears tired  HEENT: PERRL, no icteric sclera or cyanosis, no thrush  Neck:  Supple, no lymphadenopathy or thyromegaly, no JVD  Lungs:  Equal breath sounds severely decreased at bases, no wheezing, minimal crackles  Heart: S1S2 irregular, no murmures or gallops  Abdomen: soft, non-tender, bowel sounds  present  Extrimities: + diffuse pitting edema, no clubbing or cyanosis  Neuro: Alert and oriented x 3, no focal neurodeficits   Skin: intact, no rashes    Diagnostic Data:  CBC:   Results from last 7 days   Lab Units 07/04/20  1729   WBC Thousand/uL 13 84*   HEMOGLOBIN g/dL 10 1*   HEMATOCRIT % 33 4*   PLATELETS Thousands/uL 176       CMP:   Results from last 7 days   Lab Units 07/04/20  1732   POTASSIUM mmol/L 3 8   CHLORIDE mmol/L 104   CO2 mmol/L 32   BUN mg/dL 14   CREATININE mg/dL 1 38*   CALCIUM mg/dL 8 8   ALK PHOS U/L 166*   ALT U/L 19   AST U/L 38     PT/INR:   Lab Results   Component Value Date    INR 1 09 07/04/2020   ,     Microbiology:  Results from last 7 days   Lab Units 07/04/20  1728 07/04/20  1715   BLOOD CULTURE  Received in Microbiology Lab  Culture in Progress  Received in Microbiology Lab  Culture in Progress          ABG: No results found for: PHART, WBK3RCO, PO2ART, DOS7GSN, [id number], BEART, SOURCE    Imaging:  Chest x-ray reviewed on PACs:  Pulmonary edema with small bilateral pleural effusions    Cardiac lab/EKG/telemetry/ECHO:   Results from last 7 days   Lab Units 07/05/20  0043 07/04/20  2049 07/04/20  1729   TROPONIN I ng/mL 0 26* 0 14* 0 16*           Telemetry:  Atrial fibrillation 120s per minute  Echocardiogram:  LVEF 51%, grade 2 diastolic dysfunction, normal RV systolic function but mildly dilated, estimated peak PA pressure 53     Code Status: Level 3 - DNAR and DNI    Mandi Traore MD    Portions of the record may have been created with voice recognition software  Occasional wrong word or "sound a like" substitutions may have occurred due to the inherent limitations of voice recognition software  Read the chart carefully and recognize, using context, where substitutions have occurred

## 2020-07-05 NOTE — H&P
[facility] [place] [facility]  H&P- Letitia Reyes 1945, 75 y o  female MRN: [id number]    Unit/Bed#: E4 -01 Encounter: [id number]    Primary Care Provider: No primary care provider on file  Date and time admitted to hospital: 7/4/2020  5:10 PM        * Acute on chronic diastolic heart failure (HCC)  Assessment & Plan  Wt Readings from Last 3 Encounters:   07/04/20 (!) 137 kg (302 lb 4 oz)   05/24/20 115 kg (252 lb 10 4 oz)   04/25/20 111 kg (243 lb 11 2 oz)     Patient presented to ED in respiratory distress  Placed on BiPAP with improvement in work of breathing  Given 80 mg IV Lasix and nitroglycerin  Chest x-ray with significant interstitial edema  Notes worsening edema and shortness of breath for the past few days, but notes this episode came on suddenly  Weight up 50 lb since discharge in May, monitor daily weights  Echo showing EF of 55% with grade 2 diastolic dysfunction, mild to moderate mitral and tricuspid regurg  Intake and output, fluid restriction, salt restriction  Initiate 40 mg IV Lasix b i d , home regimen 40 mg p o  Lasix daily  Monitor step-down level to status with continuous cardiopulmonary monitoring  Continue BiPAP, wean to nasal cannula as tolerated, home oxygen at 2 L per patient  Cardiology consult            Cellulitis  Assessment & Plan  Patient with increased erythema, warmth on left shin  Notes this just started yesterday  Concern for cellulitis  Started on IV Ancef, continue  Serial exams  Also with fungal appearing rash under breast folds-nystatin powder b i d      Atrial fibrillation ([address] )  Assessment & Plan  History of AFib, currently in sinus rhythm  Continue rate control with metoprolol  Continue Eliquis  Consult to case management as family reports they cannot afford this medication and just ran out of free trial month    Stage 3 chronic kidney disease ([address] )  Assessment & Plan  Creatinine stable at 1 38, baseline 1 2-1 6  Avoid nephrotoxins and hypotension  Monitor while on IV Lasix      Chronic anemia  Assessment & Plan  Hemoglobin stable at 10 1, baseline 8-9  Continue folate, iron and vitamin B12    Elevated troponin  Assessment & Plan  Troponin elevated to 0 16 at time of presentation  Likely type 2 MI secondary to pulmonary edema and demand  Trend to peak  Cardiology consult  EKG without acute ST changes  Monitor on telemetry    VTE Prophylaxis: Apixaban (Eliquis)  / sequential compression device   Code Status:  DNR DNI  POLST: POLST form is not discussed and not completed at this time  Discussion with family:  Spoke with daughter Angie Cohen over the phone    Anticipated Length of Stay:  Patient will be admitted on an Inpatient basis with an anticipated length of stay of  more than 2 midnights  Justification for Hospital Stay:  Acute CHF exacerbation    Total Time for Visit, including Counseling / Coordination of Care: 1 hour  Greater than 50% of this total time spent on direct patient counseling and coordination of care  Chief Complaint:   Shortness of breath    History of Present Illness:    Bridger Cevallos is a 75 y o  female with past medical history of CHF, CKD, anemia, AFib who presents with shortness of breath  Patient reports she became acutely short of breath this evening and her daughter called 911  Patient reports she has been feeling intermittently short of breath for the past few days, noting she has had to move her home oxygen from 1-2 L to 5 L for comfort  Patient placed on BiPAP upon presentation to ED for respiratory distress, notes improvement in her breathing on BiPAP  Patient notes worsening edema in her legs and abdomen for the past few days  Has been taking 40 mg p o  Lasix daily  Notes she was discharged from the hospital approximately 1 month ago with home health, but notes she kicked them out"  Patient reports she does not often weigh herself at home    Patient reports she is having difficulty getting out of bed secondary to edema and shortness of breath  Patient daughter reports patient became acutely short of breath today although was complaining of some shortness of breath yesterday as well  Daughter reports she cares for her mother 24 hours a day  Review of Systems:    Review of Systems   Constitutional: Negative for chills and fever  HENT: Negative for trouble swallowing  Eyes: Negative for visual disturbance  Respiratory: Positive for shortness of breath  Negative for cough  Cardiovascular: Positive for leg swelling  Negative for chest pain  Gastrointestinal: Positive for abdominal distention  Negative for abdominal pain and vomiting  Genitourinary: Negative for difficulty urinating  Musculoskeletal: Positive for gait problem  Negative for back pain  Skin: Positive for rash  Neurological: Negative for dizziness  Psychiatric/Behavioral: Negative for confusion  Past Medical and Surgical History:     Past Medical History:   Diagnosis Date    Atrial fibrillation (HCC)     Chronic anemia     CKD (chronic kidney disease)     Diastolic CHF (HCC)        Past Surgical History:   Procedure Laterality Date    APPENDECTOMY      HYSTERECTOMY      TONSILLECTOMY         Meds/Allergies:    Prior to Admission medications    Medication Sig Start Date End Date Taking?  Authorizing Provider   apixaban (ELIQUIS) 5 mg Take 1 tablet (5 mg total) by mouth 2 (two) times a day 5/24/20   Lyric Lucas MD   aspirin 81 mg chewable tablet Chew 1 tablet (81 mg total) daily 4/1/20   Parish Coleman MD   cyanocobalamin (VITAMIN B-12) 1000 MCG tablet Take 1 tablet (1,000 mcg total) by mouth daily 4/1/20   Parish Coleman MD   ferrous gluconate (FERGON) 324 mg tablet Take 1 tablet (324 mg total) by mouth daily before breakfast 4/1/20   Parish Coleman MD   folic acid (FOLVITE) 1 mg tablet Take 1 tablet (1 mg total) by mouth daily 4/1/20   Parish Colemna MD   furosemide (LASIX) 40 mg tablet Take 1 tablet (40 mg total) by mouth daily 4/1/20   Torsten MD Socrates   metoprolol succinate (TOPROL-XL) 50 mg 24 hr tablet Take 1 tablet (50 mg total) by mouth daily 5/25/20   Suzanna lElington MD   nitroglycerin (NITROSTAT) 0 4 mg SL tablet Place 1 tablet (0 4 mg total) under the tongue every 5 (five) minutes as needed for chest pain 3/31/20   Mateusz Wilcox MD     I have reviewed home medications with patient personally  Allergies: Allergies   Allergen Reactions    Sulfa Antibiotics Hallucinations       Social History:     Marital Status:    Occupation:  Unknown  Patient Pre-hospital Living Situation:  Home with daughter  Patient Pre-hospital Level of Mobility:  Ambulatory at baseline  Patient Pre-hospital Diet Restrictions:  Cardiac  Substance Use History:   Social History     Substance and Sexual Activity   Alcohol Use Not Currently    Alcohol/week: 1 0 standard drinks    Types: 1 Shots of liquor per week    Frequency: Monthly or less    Drinks per session: 1 or 2    Binge frequency: Never     Social History     Tobacco Use   Smoking Status Former Smoker   Smokeless Tobacco Never Used     Social History     Substance and Sexual Activity   Drug Use Never       Family History:    No family history on file  Physical Exam:     Vitals:   Blood Pressure: 129/63 (07/04/20 1918)  Pulse: 62 (07/04/20 1945)  Temperature: (!) 97 1 °F (36 2 °C) (07/04/20 1721)  Temp Source: Temporal (07/04/20 1721)  Respirations: 18 (07/04/20 1945)  Weight - Scale: (!) 137 kg (302 lb 4 oz) (07/04/20 1716)  SpO2: 100 % (07/04/20 1945)    Physical Exam   Constitutional: She is oriented to person, place, and time  She appears well-nourished  On BiPAP   HENT:   Head: Normocephalic and atraumatic  Eyes: Conjunctivae are normal  No scleral icterus  Neck: Neck supple  Cardiovascular: Normal rate, regular rhythm and normal heart sounds  No murmur heard  Pulmonary/Chest: Tachypnea noted  She has decreased breath sounds (Throughout)  She has no wheezes  Abdominal: Soft   Bowel sounds are normal  She exhibits no distension  There is no tenderness  Musculoskeletal: She exhibits edema (3-4+ pitting edema)  Neurological: She is alert and oriented to person, place, and time  Skin: Skin is warm and dry  Rash (red, moist rash under breast folds) noted  There is erythema (area of erythema and warmth to lateral left calf )  Psychiatric: She has a normal mood and affect  Her behavior is normal  Thought content normal    Vitals reviewed  Additional Data:     Lab Results: I have personally reviewed pertinent reports  Results from last 7 days   Lab Units 07/04/20  1729   WBC Thousand/uL 13 84*   HEMOGLOBIN g/dL 10 1*   HEMATOCRIT % 33 4*   PLATELETS Thousands/uL 176   NEUTROS PCT % 40*   LYMPHS PCT % 47*   MONOS PCT % 10   EOS PCT % 2     Results from last 7 days   Lab Units 07/04/20  1732   SODIUM mmol/L 140   POTASSIUM mmol/L 3 8   CHLORIDE mmol/L 104   CO2 mmol/L 32   BUN mg/dL 14   CREATININE mg/dL 1 38*   ANION GAP mmol/L 4   CALCIUM mg/dL 8 8   ALBUMIN g/dL 2 6*   TOTAL BILIRUBIN mg/dL 1 63*   ALK PHOS U/L 166*   ALT U/L 19   AST U/L 38   GLUCOSE RANDOM mg/dL 140     Results from last 7 days   Lab Units 07/04/20  1715   INR  1 09             Results from last 7 days   Lab Units 07/04/20  1918 07/04/20  1729   LACTIC ACID mmol/L 1 5 2 5*       Imaging: I have personally reviewed pertinent reports  XR chest portable - 1 view   ED Interpretation by Johana Gaines DO (07/04 1822)   Abnormal   Cardiomeg, increased markings/ CHF          EKG, Pathology, and Other Studies Reviewed on Admission:   · EKG:  Sinus tachycardia, no acute ST changes    Allscripts / Epic Records Reviewed: Yes     ** Please Note: This note has been constructed using a voice recognition system   **

## 2020-07-05 NOTE — ASSESSMENT & PLAN NOTE
Multifactorial likely secondary to chronic hypercapnia given elevated bicarb on BMP possibly secondary to up obesity hypoventilation and CHF exacerbation  -patient on BiPAP  -critical care consultation appreciated  -patient currently awake alert and oriented, stating she is a DNR DNI however is okay with pressors if needed  -will maintain on BiPAP and monitor

## 2020-07-05 NOTE — ASSESSMENT & PLAN NOTE
Creatinine stable at 1 38, baseline 1 2-1 6  Avoid nephrotoxins and hypotension  Monitor while on IV Lasix

## 2020-07-05 NOTE — ASSESSMENT & PLAN NOTE
Patient went into AFib with RVR  Will load with amiodarone and start amiodarone drip  Cardiology input appreciated  Continue Eliquis  Consult to case management as family reports they cannot afford this medication and just ran out of free trial month

## 2020-07-05 NOTE — CONSULTS
Cardiology Consult  07/05/20    Referring Physian: MD AURORA Keller    Chief Complain/Reason for Referal:     IMPRESSION/RECOMMENDATIONS/DISCUSSION:  1  Acute on chronic diastolic heart failure  2  Paroxysmal atrial fibrillation  3  Cellulitis  4  Chronic kidney disease  5  Anemia of chronic disease  6  Obese  7  Pulmonary hypertension secondary to probable sleep apnea and obesity hypoventilation       · Patient markedly volume overloaded with significant edema up to the hips, and about 50 lb over previous admission weight  Will start Bumex drip with empiric potassium 20 mEq TID  · Daily BMP, bed weights  · Replace aspirin with Eliquis in light of paroxysmal atrial fibrillation watch hemoglobin cautiously    =====================================================    HPI:  I am seeing this patient in cardiology consultation for:  Acute heart failure      Glenis Gomez is a 75 y o  without much medical care before 2020 was morbidly obese, initially seen by our group 03/2021 she presented with congestive heart failure  She was readmitted with atrial fibrillation 05/2020  And diuresed at that time as well  She was maintained on metoprolol succinate 50 mg daily and continued on Eliquis anticoagulation and discharged on furosemide 40 mg daily  She has been noncompliant with her medications at home  During discharge last admission she was about 252 lb with her admission weight now at 300 lb  She admits to lower extremity edema, orthopnea, shortness of breath the subjective weight gain  She was placed on BiPAP on admission, and now has transition to nasal cannula after IV furosemide          Past Medical History:   Diagnosis Date    Atrial fibrillation (HCC)     Chronic anemia     CKD (chronic kidney disease)     Diastolic CHF (HCC)          Scheduled Meds:  Current Facility-Administered Medications:  acetaminophen 650 mg Oral Q4H PRN Dorita Baird PA-C    aspirin 81 mg Oral Daily Markel Albarran PA-C calcium carbonate 1,000 mg Oral TID PRN Nano Lira, PA-C    cefazolin 1,000 mg Intravenous Q8H Dorita Smudde, PA-C Last Rate: 1,000 mg (07/05/20 9986)   cyanocobalamin 1,000 mcg Oral Daily Dorita Smudde, PA-C    ferrous gluconate 324 mg Oral Daily Before Breakfast Dorita Smudde, PA-C    folic acid 1 mg Oral Daily Dorita Smudde, PA-C    furosemide 40 mg Intravenous BID Dorita Smudde, PA-C    metoprolol succinate 50 mg Oral Daily Dorita Smudde, PA-C    nitroglycerin 0 4 mg Sublingual Q5 Min PRN Dorita Smudde, PA-C    nystatin  Topical BID Dorita Smudde, PA-C    ondansetron 4 mg Intravenous Q6H PRN Dorita Smudde, PA-C    senna 1 tablet Oral HS PRN Dorita Smudde, PA-C      Continuous Infusions:   PRN Meds:   acetaminophen    calcium carbonate    nitroglycerin    ondansetron    senna  Allergies   Allergen Reactions    Sulfa Antibiotics Hallucinations     I reviewed the Home Medication list in the chart  No family history on file  Social History     Socioeconomic History    Marital status:       Spouse name: Not on file    Number of children: Not on file    Years of education: Not on file    Highest education level: Not on file   Occupational History    Not on file   Social Needs    Financial resource strain: Not on file    Food insecurity:     Worry: Not on file     Inability: Not on file    Transportation needs:     Medical: Not on file     Non-medical: Not on file   Tobacco Use    Smoking status: Former Smoker    Smokeless tobacco: Never Used   Substance and Sexual Activity    Alcohol use: Not Currently     Alcohol/week: 1 0 standard drinks     Types: 1 Shots of liquor per week     Frequency: Monthly or less     Drinks per session: 1 or 2     Binge frequency: Never    Drug use: Never    Sexual activity: Not Currently     Partners: Male   Lifestyle    Physical activity:     Days per week: Not on file     Minutes per session: Not on file    Stress: Not on file   Relationships    Social connections:     Talks on phone: Not on file     Gets together: Not on file     Attends [employer] service: Not on file     Active member of club or organization: Not on file     Attends meetings of clubs or organizations: Not on file     Relationship status: Not on file    Intimate partner violence:     Fear of current or ex partner: Not on file     Emotionally abused: Not on file     Physically abused: Not on file     Forced sexual activity: Not on file   Other Topics Concern    Not on file   Social History Narrative    Not on file       Review of Systems - as per HPI, all others reviewed and negative  Vitals:    07/05/20 0700   BP: 103/51   Pulse: 66   Resp: 20   Temp: 97 5 °F (36 4 °C)   SpO2: 94%     I/O     None        Weight (last 2 days)     Date/Time   Weight    07/05/20 0600   135 (298 28)    07/04/20 2027   (!) 137 (300 93)    07/04/20 1716   (!) 137 (302 25)              GEN: NAD, Alert, morbidly obese  HEENT: Mucus membranes moist, pink conjunctivae  EYES: Pupils equal, sclera anicteric  NECK: No JVD/HJR, no carotid bruit, obese neck  CARDIOVASCULAR: RRR, No murmur, rub, gallops S1,S2, no parasternal heave/thrill, distant heart sounds  LUNGS: Clear To auscultation bilaterally anteriorly  ABDOMEN: Soft, nondistended, no hepatic systolic pulsation  EXTREMITIES/VASCULAR: 4+edema up to the thighs and hips    PSYCH: Normal Affect by limited examination  NEURO: Grossly intact by limited examination    HEME:  Some ecchymosis in arms was noted  SKIN:  With ecchymosis in arms noted    EKG:  Sinus tachycardia, low-voltage    ECHO:  Prior echocardiogram reviewed, ejection fraction 55%    Chest x-ray reveals findings of congestive heart failure and small right pleural effusion       Results from last 7 days   Lab Units 07/04/20  1729   WBC Thousand/uL 13 84*   HEMOGLOBIN g/dL 10 1*   HEMATOCRIT % 33 4*   PLATELETS Thousands/uL 176   NEUTROS PCT % 40*   MONOS PCT % 10     Results from last 7 days   Lab Units 07/04/20  1732   POTASSIUM mmol/L 3 8   CHLORIDE mmol/L 104   CO2 mmol/L 32   BUN mg/dL 14   CREATININE mg/dL 1 38*   CALCIUM mg/dL 8 8     Results from last 7 days   Lab Units 07/04/20  1732   POTASSIUM mmol/L 3 8   CHLORIDE mmol/L 104   CO2 mmol/L 32   BUN mg/dL 14   CREATININE mg/dL 1 38*   CALCIUM mg/dL 8 8   ALK PHOS U/L 166*   ALT U/L 19   AST U/L 38     No results found for: TROPONINT      Results from last 7 days   Lab Units 07/05/20  0043   TROPONIN I ng/mL 0 26*         Results from last 7 days   Lab Units 07/04/20  1715   INR  1 09               I have personally reviewed the EKG, CXR and Telemetry images directly  Patient Active Problem List    Diagnosis Date Noted    Cellulitis 07/04/2020     Priority: Low    Pulmonary hypertension (HCC)      Priority: Low    Atrial fibrillation (HCC) 05/16/2020     Priority: Low    Hypotension 05/16/2020     Priority: Low    Onychogryphosis 03/21/2020     Priority: Low    Stage 3 chronic kidney disease ([facility] ) 03/15/2020     Priority: Low    Cirrhosis of liver without ascites ([facility] ) 03/12/2020     Priority: Low    Cholelithiasis 03/12/2020     Priority: Low    Ventricular tachycardia ([facility] ) 03/12/2020     Priority: Low    Vitamin B12 deficiency 03/11/2020     Priority: Low    Acute on chronic respiratory failure with hypoxia ([facility] ) 03/10/2020     Priority: Low    Acute on chronic diastolic heart failure ([facility] ) 03/10/2020     Priority: Low    Elevated troponin 03/10/2020     Priority: Low    Chronic anemia 03/10/2020     Priority: Low    Thrombocytopenia ([facility] ) 03/10/2020     Priority: Low       Portions of the record may have been created with voice recognition software  Occasional wrong word or "sound a like" substitutions may have occurred due to the inherent limitations of voice recognition software  Read the chart carefully and recognize, using context, where substitutions have occurred

## 2020-07-05 NOTE — ASSESSMENT & PLAN NOTE
Patient with increased erythema, warmth on left shin  Notes this just started yesterday  Concern for cellulitis  Started on IV Ancef, continue  Serial exams  Also with fungal appearing rash under breast folds-nystatin powder b i d

## 2020-07-05 NOTE — PLAN OF CARE
Problem: Potential for Falls  Goal: Patient will remain free of falls  Description  INTERVENTIONS:  - Assess patient frequently for physical needs  -  Identify cognitive and physical deficits and behaviors that affect risk of falls    -  [place] fall precautions as indicated by assessment   - Educate patient/family on patient safety including physical limitations  - Instruct patient to call for assistance with activity based on assessment  - Modify environment to reduce risk of injury  - Consider OT/PT consult to assist with strengthening/mobility  Outcome: Progressing     Problem: Prexisting or High Potential for Compromised Skin Integrity  Goal: Skin integrity is maintained or improved  Description  INTERVENTIONS:  - Identify patients at risk for skin breakdown  - Assess and monitor skin integrity  - Assess and monitor nutrition and hydration status  - Monitor labs   - Assess for incontinence   - Turn and reposition patient  - Assist with mobility/ambulation  - Relieve pressure over bony prominences  - Avoid friction and shearing  - Provide appropriate hygiene as needed including keeping skin clean and dry  - Evaluate need for skin moisturizer/barrier cream  - Collaborate with interdisciplinary team   - Patient/family teaching  - Consider wound care consult   Outcome: Progressing     Problem: CARDIOVASCULAR - ADULT  Goal: Maintains optimal cardiac output and hemodynamic stability  Description  INTERVENTIONS:  - Monitor I/O, vital signs and rhythm  - Monitor for S/S and trends of decreased cardiac output  - Administer and titrate ordered vasoactive medications to optimize hemodynamic stability  - Assess quality of pulses, skin color and temperature  - Assess for signs of decreased coronary artery perfusion  - Instruct patient to report change in severity of symptoms  Outcome: Progressing  Goal: Absence of cardiac dysrhythmias or at baseline rhythm  Description  INTERVENTIONS:  - Continuous cardiac monitoring, vital signs, obtain 12 lead EKG if ordered  - Administer antiarrhythmic and heart rate control medications as ordered  - Monitor electrolytes and administer replacement therapy as ordered  Outcome: Progressing     Problem: RESPIRATORY - ADULT  Goal: Achieves optimal ventilation and oxygenation  Description  INTERVENTIONS:  - Assess for changes in respiratory status  - Assess for changes in mentation and behavior  - Position to facilitate oxygenation and minimize respiratory effort  - Oxygen administered by appropriate delivery if ordered  - Initiate smoking cessation education as indicated  - Encourage broncho-pulmonary hygiene including cough, deep breathe, Incentive Spirometry  - Assess the need for suctioning and aspirate as needed  - Assess and instruct to report SOB or any respiratory difficulty  - Respiratory Therapy support as indicated  Outcome: Progressing     Problem: METABOLIC, FLUID AND ELECTROLYTES - ADULT  Goal: Fluid balance maintained  Description  INTERVENTIONS:  - Monitor labs   - Monitor I/O and WT  - Instruct patient on fluid and nutrition as appropriate  - Assess for signs & symptoms of volume excess or deficit  Outcome: Progressing     Problem: SKIN/TISSUE INTEGRITY - ADULT  Goal: Skin integrity remains intact  Description  INTERVENTIONS  - Identify patients at risk for skin breakdown  - Assess and monitor skin integrity  - Assess and monitor nutrition and hydration status  - Monitor labs (i e  albumin)  - Assess for incontinence   - Turn and reposition patient  - Assist with mobility/ambulation  - Relieve pressure over bony prominences  - Avoid friction and shearing  - Provide appropriate hygiene as needed including keeping skin clean and dry  - Evaluate need for skin moisturizer/barrier cream  - Collaborate with interdisciplinary team (i e  Nutrition, Rehabilitation, etc )   - Patient/family teaching  Outcome: Progressing     Problem: MUSCULOSKELETAL - ADULT  Goal: Maintain or return mobility to safest level of function  Description  INTERVENTIONS:  - Assess patient's ability to carry out ADLs; assess patient's baseline for ADL function and identify physical deficits which impact ability to perform ADLs (bathing, care of mouth/teeth, toileting, grooming, dressing, etc )  - Assess/evaluate cause of self-care deficits   - Assess range of motion  - Assess patient's mobility  - Assess patient's need for assistive devices and provide as appropriate  - Encourage maximum independence but intervene and supervise when necessary  - Involve family in performance of ADLs  - Assess for home care needs following discharge   - Consider OT consult to assist with ADL evaluation and planning for discharge  - Provide patient education as appropriate  Outcome: Progressing

## 2020-07-06 NOTE — PROGRESS NOTES
Cardiology Progress Note   MD Renny Herbert MD Paulino Seminole, DO, Theressa Charles Mansoor, MD Massie Dacosta, DO, Jesse Burns DO, [facility] [place]  ----------------------------------------------------------------  [address] [facility]  [address] [place] [address], [address] [facility] 75 y o  female MRN: [id number]  Unit/Bed#: E4 -01 Encounter: [id number]      ASSESSMENT:   Acute on chronic diastolic heart failure  Acute on chronic hypoxic respiratory failure  Non MI troponin elevation secondary to hypoxic respiratory failure and diastolic heart failure  LVEF 55% with grade 2 diastolic dysfunction, mild-mod MR and TR with mild-to-moderate pulmonary hypertension w/ PASP 53 mmHg, March 2020  Paroxysmal atrial fibrillation with rapid ventricular response overnight - currently in sinus rhythm on amiodarone  Acute kidney injury on chronic kidney disease stage 3  Mild-to-moderate pulmonary hypertension  Obesity  Anemia  Hypoalbuminemia    PLAN:  Patient has been having good diuresis on a Lasix drip  Still requiring 5 L nasal cannula and is markedly edematous  She is net -3 2 L since admission and down 6 lb  Continue with strict I/Os and daily weights  Subtle blunting patient's creatinine this morning likely related to hypotension yesterday  Clinically she still markedly volume overloaded  Continue Lasix drip and repeat renal function studies later today  Continue amiodarone to maintain sinus rhythm; once infusion completed would transition to amiodarone 400 mg t i d  For 5 days and then 200 mg daily  Continue Eliquis for anticoagulation and Toprol-XL for rate control  Pulmonary input appreciated  If creatinine continues to bump, would consider consultation to Nephrology  Keep potassium greater than 4 and magnesium greater than 2  BiPAP p r n  Guarded prognosis    Signed: Gary Bailey DO, [facility] [place]      History of Present Illness:  Patient seen and examined    Denies chest pain, pressure, tightness or squeezing  Admits to continued shortness of breath somewhat improved from yesterday  Admits to continued bilateral lower extremity swelling  Denies lightheadedness or dizziness  Review of Systems:  Review of Systems   Constitution: Negative for decreased appetite, fever, weight gain and weight loss  HENT: Negative for congestion and sore throat  Eyes: Negative for visual disturbance  Cardiovascular: Positive for dyspnea on exertion and leg swelling  Negative for chest pain, near-syncope and palpitations  Respiratory: Positive for shortness of breath  Negative for cough  Hematologic/Lymphatic: Negative for bleeding problem  Skin: Negative for rash  Musculoskeletal: Negative for myalgias and neck pain  Gastrointestinal: Negative for abdominal pain and nausea  Neurological: Negative for light-headedness and weakness  Psychiatric/Behavioral: Negative for depression  Allergies   Allergen Reactions    Sulfa Antibiotics Hallucinations       No current facility-administered medications on file prior to encounter        Current Outpatient Medications on File Prior to Encounter   Medication Sig    apixaban (ELIQUIS) 5 mg Take 1 tablet (5 mg total) by mouth 2 (two) times a day    aspirin 81 mg chewable tablet Chew 1 tablet (81 mg total) daily    cyanocobalamin (VITAMIN B-12) 1000 MCG tablet Take 1 tablet (1,000 mcg total) by mouth daily    ferrous gluconate (FERGON) 324 mg tablet Take 1 tablet (324 mg total) by mouth daily before breakfast    folic acid (FOLVITE) 1 mg tablet Take 1 tablet (1 mg total) by mouth daily    furosemide (LASIX) 40 mg tablet Take 1 tablet (40 mg total) by mouth daily    metoprolol succinate (TOPROL-XL) 50 mg 24 hr tablet Take 1 tablet (50 mg total) by mouth daily    nitroglycerin (NITROSTAT) 0 4 mg SL tablet Place 1 tablet (0 4 mg total) under the tongue every 5 (five) minutes as needed for chest pain          Current Facility-Administered Medications:  acetaminophen 650 mg Oral Q4H PRN Aria Light PA-C    amiodarone 0 5 mg/min Intravenous Continuous Maynor Valladares MD Last Rate: 0 5 mg/min (07/05/20 1926)   apixaban 5 mg Oral BID Rujul Solis, DO    calcium carbonate 1,000 mg Oral TID PRN Lashonda MooredeCHRISTOPHER    cefazolin 1,000 mg Intravenous Q8H Dorita SmJENNIFER songC Last Rate: Stopped (07/06/20 7143)   cyanocobalamin 1,000 mcg Oral Daily Dorita Smudde, PA-KOSTA    ferrous gluconate 324 mg Oral Daily Before Breakfast Dorita Smudde, PA-KOSTA    folic acid 1 mg Oral Daily Dorita Smudde, PA-C    furosemide 10 mg/hr Intravenous Continuous Rujul Solis, DO Last Rate: 10 mg/hr (07/05/20 1149)   guaiFENesin 200 mg Oral Q4H PRN Maynor Valladares MD    metoprolol succinate 25 mg Oral Daily Rujul Solis, DO    nitroglycerin 0 4 mg Sublingual Q5 Min PRN Dorita SmuddeCHRISTOPHER    nystatin  Topical BID Dorita Smudde, PA-C    ondansetron 4 mg Intravenous Q6H PRN Dorita Smudde, PA-C    potassium chloride 20 mEq Oral TID With Meals Rujul Solis, DO    senna 1 tablet Oral HS PRN Aria Light PA-C          amiodarone 0 5 mg/min Last Rate: 0 5 mg/min (07/05/20 1926)   furosemide 10 mg/hr Last Rate: 10 mg/hr (07/05/20 1149)       Vitals:    07/05/20 2320 07/06/20 0312 07/06/20 0541 07/06/20 0700   BP: 95/58   111/70   BP Location: Left arm   Left arm   Pulse: 56   63   Resp: (!) 25   (!) 24   Temp: 97 5 °F (36 4 °C)   (!) 97 °F (36 1 °C)   TempSrc: Temporal   Temporal   SpO2: 94% 99%  96%   Weight:   134 kg (294 lb 8 6 oz)    Height:             Intake/Output Summary (Last 24 hours) at 7/6/2020 0937  Last data filed at 7/6/2020 0656  Gross per 24 hour   Intake 268 33 ml   Output 3003 ml   Net -2734 67 ml       Weight change: -3 5 kg (-7 lb 11 5 oz)    PHYSICAL EXAMINATION:  Gen: Awake, Alert, NAD  HEENT: AT/NC, Anicteric, mmm  Neck: Supple, difficult to assess neck veins due to body habitus  Resp:  Decreased breath sounds bilaterally with mild basilar crackles  CV: RRR +S1, S2, No m/r/g  Abd: Soft, NT/ND + BS  Ext: warm, +2 pitting edema LE bilaterally  Neuro: Follows commands, moves all extermities  Psych: Appropriate affect    Lab Results:  Results from last 7 days   Lab Units 07/06/20  0534   WBC Thousand/uL 6 07   HEMOGLOBIN g/dL 9 3*   HEMATOCRIT % 30 7*   PLATELETS Thousands/uL 128*     Results from last 7 days   Lab Units 07/06/20  0533  07/04/20  1732   POTASSIUM mmol/L 4 4   < > 3 8   CHLORIDE mmol/L 104   < > 104   CO2 mmol/L 35*   < > 32   BUN mg/dL 16   < > 14   CREATININE mg/dL 1 59*   < > 1 38*   CALCIUM mg/dL 8 8   < > 8 8   ALK PHOS U/L  --   --  166*   ALT U/L  --   --  19   AST U/L  --   --  38    < > = values in this interval not displayed  No results found for: Azra Howard      Results from last 7 days   Lab Units 07/05/20  1145 07/05/20  0043 07/04/20  2049   TROPONIN I ng/mL 0 16* 0 26* 0 14*     Results from last 7 days   Lab Units 07/04/20  1715   INR  1 09       Tele: AF w/ RVR -> SR w/ rare VPCs    This note was completed in part utilizing M-Modal Fluency Direct Software  Grammatical errors, random word insertions, spelling mistakes, and incomplete sentences may be an occasional consequence of this system secondary to software limitations, ambient noise, and hardware issues  If you have any questions or concerns about the content, text, or information contained within the body of this dictation, please contact the provider for clarification

## 2020-07-06 NOTE — ASSESSMENT & PLAN NOTE
Patient went into AFib with RVR  Continue with amiodarone drip  Cardiology input appreciated  Continue Eliquis  Consult to case management as family reports they cannot afford this medication and just ran out of free trial month

## 2020-07-06 NOTE — CONSULTS
Consulted for CHF diet education  PT and daughter present in room  Diet hx: PT reported no eating food for B and L but drinking soda and ice tea through out the day, for dinner has hot dog with mashed potatoes and cheese or hot dog with macaroni and cheese, or tuna with noodles and cream of mushroom soup, later PT snacks on chips  PT's dtr does the food shopping and cooking  PT and dtr reported limited finances and limited transportation  Provided verbal and written Heart Failure Nutrition Therapy diet education, reviewed foods high in sodium to avoid and provided alternate suggestions for lower sodium foods, encouraged reading food labels, provided tips for flavoring food versus salt  Discussed monitoring weight and fluid intake  Also provided information on Healthy Eating on a Budget due to limited finances  PT appeared frustrated and upset with medical condition and having to make dietary changes  Will continue to monitor for any diet questions or concerns  Currently on cardiac and 1500mL fluid restriction, added 2gm Na

## 2020-07-06 NOTE — PROGRESS NOTES
Progress Note - Ritu Jhaveri 1945, 75 y o  female MRN: [id number]    Unit/Bed#: E4 -01 Encounter: [id number]    Primary Care Provider: No primary care provider on file  Date and time admitted to hospital: 7/4/2020  5:10 PM        * Acute on chronic diastolic heart failure (HCC)  Assessment & Plan  Wt Readings from Last 3 Encounters:   07/06/20 134 kg (294 lb 8 6 oz)   05/24/20 115 kg (252 lb 10 4 oz)   04/25/20 111 kg (243 lb 11 2 oz)     Patient presented to ED in respiratory distress  Placed on BiPAP with improvement in work of breathing  Given 80 mg IV Lasix and nitroglycerin  Chest x-ray with significant interstitial edema  Weight up 50 lb since discharge in May, monitor daily weights  Echo showing EF of 55% with grade 2 diastolic dysfunction, mild to moderate mitral and tricuspid regurg  Cardiology consultation appreciated, patient started on Lasix drip  Net -3 2 L since admission and down 6lb  Intake and output, fluid restriction, salt restriction      Acute on chronic respiratory failure with hypoxia and hypercapnia (HCC)  Assessment & Plan  Multifactorial likely secondary to chronic hypercapnia given elevated bicarb on BMP possibly secondary to up obesity hypoventilation and CHF exacerbation  -patient on BiPAP  -critical care consultation appreciated  -patient currently awake alert and oriented, stating she is a DNR DNI however is okay with pressors if needed  -will maintain on BiPAP and monitor    Cellulitis  Assessment & Plan  Patient with increased erythema, warmth on left shin  Notes this just started yesterday  Concern for cellulitis  Started on IV Ancef, continue  Serial exams  Also with fungal appearing rash under breast folds-nystatin powder b i d      Atrial fibrillation [facility])  Assessment & Plan  Patient went into AFib with RVR  Continue with amiodarone drip  Cardiology input appreciated  Continue Eliquis  Consult to case management as family reports they cannot afford this medication and just ran out of free trial month    Stage 3 chronic kidney disease ([facility] [address] )  Assessment & Plan  Creatinine slightly increased to 1 59, baseline 1 2-1 6  Avoid nephrotoxins and hypotension  Monitor while on IV Lasix      Chronic anemia  Assessment & Plan  Hemoglobin stable at 9 3, baseline 8-9  Continue folate, iron and vitamin B12    Elevated troponin  Assessment & Plan  Troponin elevated to 0 16 at time of presentation  Likely type 2 MI secondary to pulmonary edema and demand  Trend to peak  Cardiology consult appreciated  EKG without acute ST changes  Monitor on telemetry          VTE Pharmacologic Prophylaxis:   Pharmacologic:  Eliquis    Patient Centered Rounds: I have performed bedside rounds with nursing staff today  Discussions with Specialists or Other Care Team Provider:  Cardiology    Time Spent for Care: 20 minutes  More than 50% of total time spent on counseling and coordination of care as described above  Current Length of Stay: 2 day(s)    Current Patient Status: Inpatient   Certification Statement: The patient will continue to require additional inpatient hospital stay due to IV diuresis    Discharge Plan / Estimated Discharge Date: 2-3 days    Code Status: Level 3 - DNAR and DNI      Subjective:   Patient seen and examined at bedside, currently denies any chest pain, however patient still with some work of breathing and dyspnea  Objective:     Vitals:   Temp (24hrs), Av 3 °F (36 3 °C), Min:96 8 °F (36 °C), Max:97 5 °F (36 4 °C)    Temp:  [96 8 °F (36 °C)-97 5 °F (36 4 °C)] 97 5 °F (36 4 °C)  HR:  [] 66  Resp:  [18-28] 22  BP: ()/(52-70) 102/58  SpO2:  [93 %-99 %] 97 %  Body mass index is 55 65 kg/m²  Input and Output Summary (last 24 hours):        Intake/Output Summary (Last 24 hours) at 2020 1300  Last data filed at 2020 0656  Gross per 24 hour   Intake 168 33 ml   Output 2251 ml   Net -2082 67 ml       Physical Exam:    Constitutional: Patient is oriented to person, place and time, no acute distress  HEENT:  Normocephalic, atraumatic  Cardiovascular: Normal S1S2, RRR, No murmurs/rubs/gallops appreciated  Pulmonary:  Bilateral air entry, bibasilar rales  Abdominal: Soft, Bowel sounds present, Non-tender, Non-distended  Extremities:  2+ Lower extremity pitting edema  Neurological: Cranial nerves II-XII grossly intact, sensation intact, otherwise no focal neurological symptoms  Additional Data:     Labs:    Results from last 7 days   Lab Units 07/06/20  0534   WBC Thousand/uL 6 07   HEMOGLOBIN g/dL 9 3*   HEMATOCRIT % 30 7*   PLATELETS Thousands/uL 128*   NEUTROS PCT % 62   LYMPHS PCT % 27   MONOS PCT % 8   EOS PCT % 1     Results from last 7 days   Lab Units 07/06/20  0533  07/04/20  1732   POTASSIUM mmol/L 4 4   < > 3 8   CHLORIDE mmol/L 104   < > 104   CO2 mmol/L 35*   < > 32   BUN mg/dL 16   < > 14   CREATININE mg/dL 1 59*   < > 1 38*   CALCIUM mg/dL 8 8   < > 8 8   ALK PHOS U/L  --   --  166*   ALT U/L  --   --  19   AST U/L  --   --  38    < > = values in this interval not displayed  Results from last 7 days   Lab Units 07/04/20  1715   INR  1 09        I Have Reviewed All Lab Data Listed Above  Recent Cultures (last 7 days):     Results from last 7 days   Lab Units 07/04/20  1728 07/04/20  1715   BLOOD CULTURE  No Growth at 24 hrs  No Growth at 24 hrs         Last 24 Hours Medication List:     Current Facility-Administered Medications:  acetaminophen 650 mg Oral Q4H PRN Liat Donaldson PA-C    amiodarone 0 5 mg/min Intravenous Continuous Moira Moss MD Last Rate: 0 5 mg/min (07/05/20 1926)   apixaban 5 mg Oral BID Shalom Solis,     calcium carbonate 1,000 mg Oral TID PRN Jose Alejandro Magnus Baird PA-C    cefazolin 1,000 mg Intravenous Q8H Dorita Baird PA-C Last Rate: Stopped (07/06/20 0822)   cyanocobalamin 1,000 mcg Oral Daily Dorita Baird PA-C    ferrous gluconate 324 mg Oral Daily Before Breakfast Dorita Baird PA-C    folic acid 1 mg Oral Daily Elfego Aldridge PA-C    furosemide 10 mg/hr Intravenous Continuous Rujul Solis, DO Last Rate: 10 mg/hr (07/05/20 1149)   guaiFENesin 200 mg Oral Q4H PRN Erick Gutiérrez MD    metoprolol 5 mg Intravenous Q6H PRN Angie Jay DO    metoprolol succinate 25 mg Oral Daily Rujul Solis, DO    nitroglycerin 0 4 mg Sublingual Q5 Min PRN Dorita Baird PA-C    nystatin  Topical BID Dorita Baird PA-C    ondansetron 4 mg Intravenous Q6H PRN Dorita Baird PA-C    potassium chloride 20 mEq Oral TID With Meals Rujul Solis, DO    senna 1 tablet Oral HS PRN Elfego Aldridge PA-C         Today, Patient Was Seen By: Erick Gutiérrez MD

## 2020-07-06 NOTE — SOCIAL WORK
LOS 2 days, no bundle, no 30 day readmission and unplanned readmission is green  Met with pt to complete assessment and explain role  Pt does not currently have a PCP and in May the CM gave her the phone number for [place]  Number was put on discharge instruction sheets again, but will f/u with making a PCP appointment when ready for discharge  Pt lives in hospitals with her dtr in a 2 story house with 4 steps to enter  Pt was independent with ADLs, but family does assist with getting pt up from chair  Pt amb with RW  DME:  [facility], cane and [facility]SC  Pt is not currently open with any [facility]NA  Pt stated she through them out of her house  Pt uses [address], but could not afford [facility]  Pt has no Rx coverage  Pt has no hx of MH or D&A  Pt's dtr will transport home  Offer [facility] for CHF education and informed her insurance will cover  Pt has refused  Will f/u with finding PCP and make appointment and then have OP CM follow up with [facility] for medications

## 2020-07-06 NOTE — NURSING NOTE
Responded to pt's telemetry alarm  Rapid a  Fib with HR in the 160's  Pt c/o SOB despite O2 being 95% on 6L NC  Dr Sun Akins from cardiology at bedside for evaluation  Ordered IV lopressor x1 and stat EKG  Pt placed back on BiPap  Within a minute of being back on BiPap, pt's HR returned to sinus rhythm  IV lopressor held  Discussed with pt that she will need to stay on BiPap for the time being  Pt and daughter at bedside verbalized understanding  Pulmonology NP at bedside following episode for evaluation  Call bell within reach  Bed low and locked

## 2020-07-06 NOTE — MALNUTRITION/BMI
This medical record reflects one or more clinical indicators suggestive of malnutrition and/or morbid obesity  Malnutrition Findings:              BMI Findings:  BMI Classifications: Morbid Obesity 45-49  9(Based on more usual weight of 243lbs PT's BMI is 45 9, current BMI is 55 65 elevated d/t edema  Provided CHF diet education and added 2gm Na to current cardiac diet to help prevent fluid retention and promote weight loss  )     Body mass index is 55 65 kg/m²  See Nutrition note dated 7/6/20 for additional details  Completed nutrition assessment is viewable in the nutrition documentation

## 2020-07-06 NOTE — ASSESSMENT & PLAN NOTE
Creatinine slightly increased to 1 59, baseline 1 2-1 6  Avoid nephrotoxins and hypotension  Monitor while on IV Lasix

## 2020-07-06 NOTE — ASSESSMENT & PLAN NOTE
Wt Readings from Last 3 Encounters:   07/06/20 134 kg (294 lb 8 6 oz)   05/24/20 115 kg (252 lb 10 4 oz)   04/25/20 111 kg (243 lb 11 2 oz)     Patient presented to ED in respiratory distress  Placed on BiPAP with improvement in work of breathing  Given 80 mg IV Lasix and nitroglycerin  Chest x-ray with significant interstitial edema  Weight up 50 lb since discharge in May, monitor daily weights  Echo showing EF of 55% with grade 2 diastolic dysfunction, mild to moderate mitral and tricuspid regurg  Cardiology consultation appreciated, patient started on Lasix drip  Net -3 2 L since admission and down 6lb  Intake and output, fluid restriction, salt restriction

## 2020-07-06 NOTE — PLAN OF CARE
Problem: Potential for Falls  Goal: Patient will remain free of falls  Description  INTERVENTIONS:  - Assess patient frequently for physical needs  -  Identify cognitive and physical deficits and behaviors that affect risk of falls    -  [place] fall precautions as indicated by assessment   - Educate patient/family on patient safety including physical limitations  - Instruct patient to call for assistance with activity based on assessment  - Modify environment to reduce risk of injury  - Consider OT/PT consult to assist with strengthening/mobility  Outcome: Progressing     Problem: Prexisting or High Potential for Compromised Skin Integrity  Goal: Skin integrity is maintained or improved  Description  INTERVENTIONS:  - Identify patients at risk for skin breakdown  - Assess and monitor skin integrity  - Assess and monitor nutrition and hydration status  - Monitor labs   - Assess for incontinence   - Turn and reposition patient  - Assist with mobility/ambulation  - Relieve pressure over bony prominences  - Avoid friction and shearing  - Provide appropriate hygiene as needed including keeping skin clean and dry  - Evaluate need for skin moisturizer/barrier cream  - Collaborate with interdisciplinary team   - Patient/family teaching  - Consider wound care consult   Outcome: Progressing     Problem: CARDIOVASCULAR - ADULT  Goal: Maintains optimal cardiac output and hemodynamic stability  Description  INTERVENTIONS:  - Monitor I/O, vital signs and rhythm  - Monitor for S/S and trends of decreased cardiac output  - Administer and titrate ordered vasoactive medications to optimize hemodynamic stability  - Assess quality of pulses, skin color and temperature  - Assess for signs of decreased coronary artery perfusion  - Instruct patient to report change in severity of symptoms  Outcome: Progressing  Goal: Absence of cardiac dysrhythmias or at baseline rhythm  Description  INTERVENTIONS:  - Continuous cardiac monitoring, vital signs, obtain 12 lead EKG if ordered  - Administer antiarrhythmic and heart rate control medications as ordered  - Monitor electrolytes and administer replacement therapy as ordered  Outcome: Progressing     Problem: RESPIRATORY - ADULT  Goal: Achieves optimal ventilation and oxygenation  Description  INTERVENTIONS:  - Assess for changes in respiratory status  - Assess for changes in mentation and behavior  - Position to facilitate oxygenation and minimize respiratory effort  - Oxygen administered by appropriate delivery if ordered  - Initiate smoking cessation education as indicated  - Encourage broncho-pulmonary hygiene including cough, deep breathe, Incentive Spirometry  - Assess the need for suctioning and aspirate as needed  - Assess and instruct to report SOB or any respiratory difficulty  - Respiratory Therapy support as indicated  Outcome: Progressing     Problem: SKIN/TISSUE INTEGRITY - ADULT  Goal: Skin integrity remains intact  Description  INTERVENTIONS  - Identify patients at risk for skin breakdown  - Assess and monitor skin integrity  - Assess and monitor nutrition and hydration status  - Monitor labs (i e  albumin)  - Assess for incontinence   - Turn and reposition patient  - Assist with mobility/ambulation  - Relieve pressure over bony prominences  - Avoid friction and shearing  - Provide appropriate hygiene as needed including keeping skin clean and dry  - Evaluate need for skin moisturizer/barrier cream  - Collaborate with interdisciplinary team (i e  Nutrition, Rehabilitation, etc )   - Patient/family teaching  Outcome: Progressing     Problem: MUSCULOSKELETAL - ADULT  Goal: Maintain or return mobility to safest level of function  Description  INTERVENTIONS:  - Assess patient's ability to carry out ADLs; assess patient's baseline for ADL function and identify physical deficits which impact ability to perform ADLs (bathing, care of mouth/teeth, toileting, grooming, dressing, etc )  - Assess/evaluate cause of self-care deficits   - Assess range of motion  - Assess patient's mobility  - Assess patient's need for assistive devices and provide as appropriate  - Encourage maximum independence but intervene and supervise when necessary  - Involve family in performance of ADLs  - Assess for home care needs following discharge   - Consider OT consult to assist with ADL evaluation and planning for discharge  - Provide patient education as appropriate  Outcome: Progressing     Problem: METABOLIC, FLUID AND ELECTROLYTES - ADULT  Goal: Fluid balance maintained  Description  INTERVENTIONS:  - Monitor labs   - Monitor I/O and WT  - Instruct patient on fluid and nutrition as appropriate  - Assess for signs & symptoms of volume excess or deficit  Outcome: Not Progressing

## 2020-07-06 NOTE — PROGRESS NOTES
Progress Note - Pulmonary   Letitia Hover 75 y o  female MRN: [id number]  Unit/Bed#: E4 -01 Encounter: [id number]    Assessment/Plan:    1  Acute on chronic hypoxic/hypercapnic respiratory failure likely secondary to CHF       -  patient continues to alternate between nasal cannula BiPAP with episodes of increased work of breathing       -  home O2 requirement 3 L- NC       -  pulmonary hygiene: IS Q 1 hr, OOB as tolerated, deep breathing cough    2  Acute on chronic diastolic CHF w/ severe PHTN WHO group II&III       -  noted pulmonary edema with anasarca, hypotension mildly improved       -  Echo- EF 55%, PA pressure 53 mmHg       -  cardiology following       -  overall output -3184,  continue daily/I&Os       -  currently on Lasix GTT    3  Proximal AFib with rapid RVR      -  currently on amiodarone GTT      -  continue Eliquis    4  Cellulitis of left leg       -  currently day #2 Ancef       -  managed per primary team    5  Suspected JESIKA with OHS       -  likely secondary to body habitus       -  continue BiPAP therapy as needed while inpatient       -  will attempt to qualify patient for BiPAP 24 hours prior to DC    6  CKD stage 3 with stable anemia       -  creatinine increasing 1 59, baseline 1 2-1 6       -  will continue to monitor and trend       -  managed per primary team    Chief Complaint:    "I am still having a hard time breathing"    Subjective:    Cirilo Sandra was comfortably sitting in her bed  Patient reported to nurse that she was feeling significantly short of breath  Upon telemetry reading she was noted to be on new onset rapid AFib  Patient was placed on BiPAP for increased work of breathing  Shortly after being placed on BiPAP patient converted to normal sinus rhythm  Patient still appears to be having increased work of breathing  Patient does report improved respiratory status with BiPAP    Patient currently denies any fever, chills, hemoptysis, headaches, night sweats, pleuritic chest pain, or palpitations  Objective:    Vitals: Blood pressure 111/70, pulse 63, temperature (!) 97 °F (36 1 °C), temperature source Temporal, resp  rate (!) 24, height 5' 1" (1 549 m), weight 134 kg (294 lb 8 6 oz), SpO2 96 %, not currently breastfeeding  BiPAP,Body mass index is 55 65 kg/m²  Intake/Output Summary (Last 24 hours) at 7/6/2020 1100  Last data filed at 7/6/2020 2667  Gross per 24 hour   Intake 268 33 ml   Output 3003 ml   Net -2734 67 ml       Invasive Devices     Peripheral Intravenous Line            Peripheral IV 07/05/20 Distal;Right;Ventral (anterior) Forearm less than 1 day    Peripheral IV 07/05/20 Right Antecubital less than 1 day          Drain            External Urinary Catheter less than 1 day                Physical Exam:   Physical Exam   Constitutional: She is oriented to person, place, and time  She appears well-developed and well-nourished  No distress  HENT:   Head: Normocephalic and atraumatic  Neck: Normal range of motion  Neck supple  No tracheal deviation present  No thyromegaly present  Cardiovascular: Normal rate, regular rhythm and normal heart sounds  Exam reveals no gallop and no friction rub  No murmur heard  Pulmonary/Chest: No accessory muscle usage or stridor  No tachypnea and no bradypnea  No respiratory distress  She has decreased breath sounds  She has no wheezes  She has no rhonchi  She has no rales  She exhibits no tenderness  Distant breath sounds   Abdominal: Soft  Bowel sounds are normal  She exhibits no distension  There is no tenderness  Musculoskeletal: Normal range of motion  She exhibits no edema or deformity  Neurological: She is alert and oriented to person, place, and time  Skin: Skin is warm and dry  She is not diaphoretic  No erythema  No pallor  Psychiatric: She has a normal mood and affect  Her behavior is normal        Labs:    I have personally reviewed pertinent lab results CBC:   Lab Results   Component Value Date    WBC 6 07 07/06/2020    HGB 9 3 (L) 07/06/2020    HCT 30 7 (L) 07/06/2020     (H) 07/06/2020     (L) 07/06/2020    MCH 32 2 07/06/2020    MCHC 30 3 (L) 07/06/2020    RDW 16 7 (H) 07/06/2020    MPV 11 0 07/06/2020    NRBC 0 07/06/2020   , CMP:   Lab Results   Component Value Date    SODIUM 140 07/06/2020    K 4 4 07/06/2020     07/06/2020    CO2 35 (H) 07/06/2020    BUN 16 07/06/2020    CREATININE 1 59 (H) 07/06/2020    CALCIUM 8 8 07/06/2020    EGFR 32 07/06/2020   , PT/INR: No results found for: PT, INR, Troponin:   Lab Results   Component Value Date    TROPONINI 0 16 (H) 07/05/2020         Imaging and other studies: I have personally reviewed pertinent films in PACS     Chest x-ray 7/4/2020- cardiomegaly CHF

## 2020-07-07 PROBLEM — A41.9 SEPSIS (HCC): Status: ACTIVE | Noted: 2020-01-01

## 2020-07-07 NOTE — ASSESSMENT & PLAN NOTE
POA criteria met with tachycardia, tachypnea, leukocytosis  Likely secondary to cellulitis  Patient with increased erythema, warmth on left shin  Continue Ancef, day 4  Also with fungal appearing rash under breast folds-nystatin powder b i d

## 2020-07-07 NOTE — PLAN OF CARE
Problem: Potential for Falls  Goal: Patient will remain free of falls  Description  INTERVENTIONS:  - Assess patient frequently for physical needs  -  Identify cognitive and physical deficits and behaviors that affect risk of falls    -  Creole fall precautions as indicated by assessment   - Educate patient/family on patient safety including physical limitations  - Instruct patient to call for assistance with activity based on assessment  - Modify environment to reduce risk of injury  - Consider OT/PT consult to assist with strengthening/mobility  Outcome: Progressing     Problem: Prexisting or High Potential for Compromised Skin Integrity  Goal: Skin integrity is maintained or improved  Description  INTERVENTIONS:  - Identify patients at risk for skin breakdown  - Assess and monitor skin integrity  - Assess and monitor nutrition and hydration status  - Monitor labs   - Assess for incontinence   - Turn and reposition patient  - Assist with mobility/ambulation  - Relieve pressure over bony prominences  - Avoid friction and shearing  - Provide appropriate hygiene as needed including keeping skin clean and dry  - Evaluate need for skin moisturizer/barrier cream  - Collaborate with interdisciplinary team   - Patient/family teaching  - Consider wound care consult   Outcome: Progressing     Problem: CARDIOVASCULAR - ADULT  Goal: Maintains optimal cardiac output and hemodynamic stability  Description  INTERVENTIONS:  - Monitor I/O, vital signs and rhythm  - Monitor for S/S and trends of decreased cardiac output  - Administer and titrate ordered vasoactive medications to optimize hemodynamic stability  - Assess quality of pulses, skin color and temperature  - Assess for signs of decreased coronary artery perfusion  - Instruct patient to report change in severity of symptoms  Outcome: Progressing  Goal: Absence of cardiac dysrhythmias or at baseline rhythm  Description  INTERVENTIONS:  - Continuous cardiac monitoring, vital signs, obtain 12 lead EKG if ordered  - Administer antiarrhythmic and heart rate control medications as ordered  - Monitor electrolytes and administer replacement therapy as ordered  Outcome: Progressing     Problem: SKIN/TISSUE INTEGRITY - ADULT  Goal: Skin integrity remains intact  Description  INTERVENTIONS  - Identify patients at risk for skin breakdown  - Assess and monitor skin integrity  - Assess and monitor nutrition and hydration status  - Monitor labs (i e  albumin)  - Assess for incontinence   - Turn and reposition patient  - Assist with mobility/ambulation  - Relieve pressure over bony prominences  - Avoid friction and shearing  - Provide appropriate hygiene as needed including keeping skin clean and dry  - Evaluate need for skin moisturizer/barrier cream  - Collaborate with interdisciplinary team (i e  Nutrition, Rehabilitation, etc )   - Patient/family teaching  Outcome: Progressing     Problem: MUSCULOSKELETAL - ADULT  Goal: Maintain or return mobility to safest level of function  Description  INTERVENTIONS:  - Assess patient's ability to carry out ADLs; assess patient's baseline for ADL function and identify physical deficits which impact ability to perform ADLs (bathing, care of mouth/teeth, toileting, grooming, dressing, etc )  - Assess/evaluate cause of self-care deficits   - Assess range of motion  - Assess patient's mobility  - Assess patient's need for assistive devices and provide as appropriate  - Encourage maximum independence but intervene and supervise when necessary  - Involve family in performance of ADLs  - Assess for home care needs following discharge   - Consider OT consult to assist with ADL evaluation and planning for discharge  - Provide patient education as appropriate  Outcome: Progressing     Problem: RESPIRATORY - ADULT  Goal: Achieves optimal ventilation and oxygenation  Description  INTERVENTIONS:  - Assess for changes in respiratory status  - Assess for changes in mentation and behavior  - Position to facilitate oxygenation and minimize respiratory effort  - Oxygen administered by appropriate delivery if ordered  - Initiate smoking cessation education as indicated  - Encourage broncho-pulmonary hygiene including cough, deep breathe, Incentive Spirometry  - Assess the need for suctioning and aspirate as needed  - Assess and instruct to report SOB or any respiratory difficulty  - Respiratory Therapy support as indicated  Outcome: Not Progressing     Problem: METABOLIC, FLUID AND ELECTROLYTES - ADULT  Goal: Fluid balance maintained  Description  INTERVENTIONS:  - Monitor labs   - Monitor I/O and WT  - Instruct patient on fluid and nutrition as appropriate  - Assess for signs & symptoms of volume excess or deficit  Outcome: Not Progressing

## 2020-07-07 NOTE — ASSESSMENT & PLAN NOTE
Multifactorial likely secondary to chronic hypercapnia given elevated bicarb on BMP possibly secondary to up obesity hypoventilation and CHF exacerbation    -patient currently awake alert and oriented, stating she is a DNR DNI however is okay with pressors if needed  -will maintain on BiPAP and monitor  -pulmonary follow-up appreciated

## 2020-07-07 NOTE — ASSESSMENT & PLAN NOTE
Wt Readings from Last 3 Encounters:   07/07/20 134 kg (295 lb 6 7 oz)   05/24/20 115 kg (252 lb 10 4 oz)   04/25/20 111 kg (243 lb 11 2 oz)     Patient presented to ED in respiratory distress  Placed on BiPAP with improvement in work of breathing  Given 80 mg IV Lasix and nitroglycerin  Chest x-ray with significant interstitial edema  Weight up 50 lb since discharge in May, monitor daily weights  Echo showing EF of 55% with grade 2 diastolic dysfunction, mild to moderate mitral and tricuspid regurg  Cardiology consultation appreciated, patient started on Lasix drip  Net -4 L since admission and down 7lb  Intake and output, fluid restriction, salt restriction

## 2020-07-07 NOTE — PROGRESS NOTES
Progress Note - Letitia Reyes 1945, 75 y o  female MRN: [id number]    Unit/Bed#: E4 -01 Encounter: [id number]    Primary Care Provider: No primary care provider on file  Date and time admitted to hospital: 7/4/2020  5:10 PM        * Acute on chronic diastolic heart failure (HCC)  Assessment & Plan  Wt Readings from Last 3 Encounters:   07/07/20 134 kg (295 lb 6 7 oz)   05/24/20 115 kg (252 lb 10 4 oz)   04/25/20 111 kg (243 lb 11 2 oz)     Patient presented to ED in respiratory distress  Placed on BiPAP with improvement in work of breathing  Given 80 mg IV Lasix and nitroglycerin  Chest x-ray with significant interstitial edema  Weight up 50 lb since discharge in May, monitor daily weights  Echo showing EF of 55% with grade 2 diastolic dysfunction, mild to moderate mitral and tricuspid regurg  Cardiology consultation appreciated, patient started on Lasix drip  Net -4 L since admission and down 7lb  Intake and output, fluid restriction, salt restriction      Acute on chronic respiratory failure with hypoxia and hypercapnia (HCC)  Assessment & Plan  Multifactorial likely secondary to chronic hypercapnia given elevated bicarb on BMP possibly secondary to up obesity hypoventilation and CHF exacerbation    -patient currently awake alert and oriented, stating she is a DNR DNI however is okay with pressors if needed  -will maintain on BiPAP and monitor  -pulmonary follow-up appreciated    Sepsis [facility])  Assessment & Plan  POA criteria met with tachycardia, tachypnea, leukocytosis  Likely secondary to cellulitis  Patient with increased erythema, warmth on left shin  Continue Ancef, day 4  Also with fungal appearing rash under breast folds-nystatin powder b i d  Atrial fibrillation [facility])  Assessment & Plan  Patient went into AFib with RVR  Initially on amiodarone drip now transition to oral amiodarone 400 mg t i d   For 5 days then 200 mg daily  Continue Eliquis for anticoagulation--will need cm support for cost  Cardiology input appreciated    Stage 3 chronic kidney disease ([facility] [address] )  Assessment & Plan  Acute kidney injury on CKD stage 3  Baseline creatinine is 1 2-1 6  Creatinine today is 1 71  Likely secondary to diuretics  Closely monitor renal function, if progressively increasing will consider nephrology consultation  Monitor BMP      Chronic anemia  Assessment & Plan  Hemoglobin stable at 9 3, baseline 8-9  Continue folate, iron and vitamin B12    Elevated troponin  Assessment & Plan  Troponin elevated to 0 16 at time of presentation  Likely type 2 non MI troponin elevation secondary to pulmonary edema and demand  Trend to peak  Cardiology consult appreciated  EKG without acute ST changes  Monitor on telemetry          VTE Pharmacologic Prophylaxis:   Pharmacologic: eliquis    Patient Centered Rounds: I have performed bedside rounds with nursing staff today  Time Spent for Care: 20 minutes  More than 50% of total time spent on counseling and coordination of care as described above  Current Length of Stay: 3 day(s)    Current Patient Status: Inpatient   Certification Statement: The patient will continue to require additional inpatient hospital stay due to Acute on chronic hypoxic and hypercapnic respiratory failure, CHF exacerbation, IV diuretics    Discharge Plan / Estimated Discharge Date: TBD    Code Status: Level 3 - DNAR and DNI      Subjective:   Patient seen and examined at bedside, appears to be in mild-to-moderate respiratory distress, tachypneic  Denies any chest pain  Will place patient back on BiPAP    Objective:     Vitals:   Temp (24hrs), Av 4 °F (36 3 °C), Min:96 8 °F (36 °C), Max:98 3 °F (36 8 °C)    Temp:  [96 8 °F (36 °C)-98 3 °F (36 8 °C)] 98 3 °F (36 8 °C)  HR:  [60-70] 70  Resp:  [20-22] 20  BP: (102-144)/(50-76) 144/63  SpO2:  [93 %-99 %] 94 %  Body mass index is 55 82 kg/m²  Input and Output Summary (last 24 hours):        Intake/Output Summary (Last 24 hours) at 2020 1155  Last data filed at 7/7/2020 0630  Gross per 24 hour   Intake 1033 92 ml   Output 1900 ml   Net -866 08 ml       Physical Exam:    Constitutional: Patient is oriented to person, place and time, no acute distress  HEENT:  Normocephalic, atraumatic  Cardiovascular: Normal S1S2, RRR, No murmurs/rubs/gallops appreciated  Pulmonary:  Bilateral air entry decreased at bilateral bases  Abdominal: Soft, Bowel sounds present, Non-tender, Non-distended  Extremities:  Bilateral lower extremity pitting edema  Neurological: Cranial nerves II-XII grossly intact, sensation intact, otherwise no focal neurological symptoms  Skin:  Erythema along lateral aspect of left lower extremity seems to be improving    Additional Data:     Labs:    Results from last 7 days   Lab Units 07/07/20  0657   WBC Thousand/uL 6 48   HEMOGLOBIN g/dL 9 7*   HEMATOCRIT % 31 3*   PLATELETS Thousands/uL 126*   NEUTROS PCT % 69   LYMPHS PCT % 21   MONOS PCT % 9   EOS PCT % 1     Results from last 7 days   Lab Units 07/07/20  0657  07/04/20  1732   POTASSIUM mmol/L 4 3   < > 3 8   CHLORIDE mmol/L 102   < > 104   CO2 mmol/L 32   < > 32   BUN mg/dL 19   < > 14   CREATININE mg/dL 1 66*   < > 1 38*   CALCIUM mg/dL 8 3   < > 8 8   ALK PHOS U/L  --   --  166*   ALT U/L  --   --  19   AST U/L  --   --  38    < > = values in this interval not displayed  Results from last 7 days   Lab Units 07/04/20  1715   INR  1 09        I Have Reviewed All Lab Data Listed Above  Recent Cultures (last 7 days):     Results from last 7 days   Lab Units 07/04/20  1728 07/04/20  1715   BLOOD CULTURE  No Growth at 48 hrs  No Growth at 48 hrs         Last 24 Hours Medication List:     Current Facility-Administered Medications:  acetaminophen 650 mg Oral Q4H PRN José Manuel Jorgensen PA-C    amiodarone 400 mg Oral TID Beau Spar, DO    apixaban 5 mg Oral BID Rujul Solis, DO    calcium carbonate 1,000 mg Oral TID PRN Dorita Baird PA-C    cefazolin 1,000 mg Intravenous Q8H CHRISTOPHER Shea Last Rate: 1,000 mg (07/07/20 0553)   cyanocobalamin 1,000 mcg Oral Daily Dorita SmCHRISTOPHER song    ferrous gluconate 324 mg Oral Daily Before Breakfast Dorita CHRISTOPHER Baird    folic acid 1 mg Oral Daily Dorita CHRISTOPHER Baird    furosemide 10 mg/hr Intravenous Continuous Dennard Carrel, DO    furosemide 20 mg Intravenous Once Dennard Carrel, DO    guaiFENesin 200 mg Oral Q4H PRN Maynor Valladares MD    ipratropium-albuterol 3 mL Nebulization Q6H Maynor Valladares MD    metoprolol 5 mg Intravenous Q6H PRN Ritchie Carrel, DO    [START ON 7/8/2020] metoprolol succinate 25 mg Oral Daily Luis Carlos Mcarthur PA-C    nitroglycerin 0 4 mg Sublingual Q5 Min PRN Dorita SmCHRISTOPHER sogn    nystatin  Topical BID Dorita Baird PA-C    ondansetron 4 mg Intravenous Q6H PRN Dorita Baird PA-C    potassium chloride 20 mEq Oral TID With Meals Shalom Solis DO    senna 1 tablet Oral HS PRN CHRISTOPHER Shea         Today, Patient Was Seen By: Maynor Valladares MD

## 2020-07-07 NOTE — PROGRESS NOTES
Progress Note - Pulmonary   Jonah Eddy 75 y o  female MRN: [id number]  Unit/Bed#: E4 -01 Encounter: [id number]    Assessment/Plan:    1  Acute on chronic hypoxic/hypercapnic respiratory failure likely secondary to CHF      -  patient continues to of any between NC and BiPAP for increased work of breathing      -  I do feel patient's anxiety is also contributing to her respiratory status      -  patient's home O2 requirements are 3 L      -  continue pulmonary hygiene: IS Q 1 hr, OOB as tolerated, deep breathing cough    2  Acute on chronic diastolic CHF w/ severe PHTN WHO group II&III       -  patient appears significantly volume overloaded with noted pulmonary edema       -  echo EF 5%, PA pressure 53 mmHg       -  cardiology following       -  Lasix drip discontinued, overall -4050 ml    3  Proximal AFib with rapid RVR       -  currently maintained on amiodarone GTT       -  continue Eliquis    4  Cellulitis of left leg       -  currently day #3 Ancef       -  managed per primary team    5  Suspected JESIKA with likely OHS in the setting of restrictive lung disease       -  likely secondary to body habitus       -  continue BiPAP therapy as needed while inpatient       -  will attempt to qualify patient for BiPAP 24 hours prior to DC- if unable to qualify patient, she was significantly benefit from formal PSG    6  CKD stage 3       -  creatinine decreasing 1 66, baseline 1 2-1 6       -  continue to monitor interest       -  managed per primary team    - pulmonary will follow up peripherally until closer to discharge to perform BiPAP qualification    Chief Complaint:    "I feel better with the mask on"    Subjective:    Tony Baker was comfortably sitting in her bed  24 hour events include that patient was on nasal cannula and unknowingly a taking her nasal cannula off at which time she was noted to be hypoxic in the low 80s with increased work of breathing    Patient was then placed on BiPAP therapy with immediate increase in saturations  Patient does state she feels more comfortable with BiPAP therapy  Patient currently denies any fever, chills, hemoptysis, night sweats, pleuritic chest pain, or palpitations  Objective:    Vitals: Blood pressure 144/63, pulse 70, temperature 98 3 °F (36 8 °C), temperature source Tympanic Core, resp  rate 20, height 5' 1" (1 549 m), weight 134 kg (295 lb 6 7 oz), SpO2 94 %, not currently breastfeeding  BiPAP,Body mass index is 55 82 kg/m²  Intake/Output Summary (Last 24 hours) at 7/7/2020 1024  Last data filed at 7/7/2020 0630  Gross per 24 hour   Intake 1033 92 ml   Output 1900 ml   Net -866 08 ml       Invasive Devices     Peripheral Intravenous Line            Peripheral IV 07/05/20 Distal;Right;Ventral (anterior) Forearm 1 day    Peripheral IV 07/05/20 Right Antecubital 1 day          Drain            External Urinary Catheter less than 1 day                Physical Exam:   Physical Exam   Constitutional: She is oriented to person, place, and time  She appears well-developed and well-nourished  No distress  HENT:   Head: Normocephalic and atraumatic  Neck: Normal range of motion  Neck supple  No tracheal deviation present  No thyromegaly present  Cardiovascular: Normal rate, regular rhythm and normal heart sounds  Exam reveals no gallop and no friction rub  No murmur heard  Pulmonary/Chest: No accessory muscle usage or stridor  No tachypnea and no bradypnea  No respiratory distress  She has decreased breath sounds  She has no wheezes  She has no rhonchi  She has no rales  She exhibits no tenderness  Distant breath sounds throughout   Abdominal: Soft  Bowel sounds are normal  She exhibits no distension  There is no tenderness  Musculoskeletal: Normal range of motion  She exhibits no edema or deformity  Neurological: She is alert and oriented to person, place, and time  Skin: Skin is warm and dry  She is not diaphoretic  No erythema  No pallor  Psychiatric: She has a normal mood and affect  Her behavior is normal        Labs:    I have personally reviewed pertinent lab results CBC:   Lab Results   Component Value Date    WBC 6 48 07/07/2020    HGB 9 7 (L) 07/07/2020    HCT 31 3 (L) 07/07/2020     (H) 07/07/2020     (L) 07/07/2020    MCH 32 7 07/07/2020    MCHC 31 0 (L) 07/07/2020    RDW 16 9 (H) 07/07/2020    MPV 11 3 07/07/2020    NRBC 0 07/07/2020   , CMP:   Lab Results   Component Value Date    SODIUM 139 07/07/2020    K 4 3 07/07/2020     07/07/2020    CO2 32 07/07/2020    BUN 19 07/07/2020    CREATININE 1 66 (H) 07/07/2020    CALCIUM 8 3 07/07/2020    EGFR 30 07/07/2020       Imaging and other studies: I have personally reviewed pertinent films in PACS     Chest x-ray 7/4/2020- cardiomegaly CHF

## 2020-07-07 NOTE — ASSESSMENT & PLAN NOTE
Patient went into AFib with RVR  Initially on amiodarone drip now transition to oral amiodarone 400 mg t i d   For 5 days then 200 mg daily  Continue Eliquis for anticoagulation--will need cm support for cost  Cardiology input appreciated

## 2020-07-07 NOTE — ASSESSMENT & PLAN NOTE
Troponin elevated to 0 16 at time of presentation  Likely type 2 non MI troponin elevation secondary to pulmonary edema and demand  Trend to peak  Cardiology consult appreciated  EKG without acute ST changes  Monitor on telemetry

## 2020-07-07 NOTE — PROGRESS NOTES
Progress Note - Cardiology   Ashish Single 75 y o  female MRN: [id number]  Unit/Bed#: E4 -01 Encounter: [id number]        Problem List:  Principal Problem:    Acute on chronic diastolic heart failure ([facility] )  Active Problems:    Acute on chronic respiratory failure with hypoxia and hypercapnia (HCC)    Elevated troponin    Chronic anemia    Stage 3 chronic kidney disease ([facility] )    Atrial fibrillation (HCC)    Cellulitis      Asessment:  1  Acute on chronic diastolic heart failure  2  Paroxysmal atrial fibrillation  3  Cellulitis  4  Chronic kidney disease  5  Anemia of chronic disease  6  Obese  7  Pulmonary hypertension secondary to probable sleep apnea and obesity hypoventilation     Plan/ Discussion:  Lasix drip held yesterday due to rising creatinine  Today her creat is about the same, only slightly lower  She tells he she was short of breath last night, and this AM is back on Bipap  She continues to have bursts of intermittent rapid a-fib  Lytes are stable today  Weight is up 1 pound from yesterday, though may not be the most accurate as she cannot stand & we need to use bed weight  On discharge in May 2020 she was 252 lbs  Net -866 mL over last 24 hours, total -4L since admission  Weight today 295  Creatinine 1 66  K+ 4 3  Na+ 139    · Continue IV Amiodarone load, has gotten approximately 2 grams thus far  · Plan to restart Lasix today, still very short of breath & renal fx is stable, repeat CXR in the next 24 hours   · Eliquis for stroke prevention, Toprol XL for rate control  · Ongoing potassium repletion  · Trend renal fx, I/O, lytes, weights     Subjective:  Short of breath today  On bipap   Did not sleep well overnight due to breathing     Vitals:  Vitals:    07/06/20 0541 07/07/20 0600   Weight: 134 kg (294 lb 8 6 oz) 134 kg (295 lb 6 7 oz)   ,  Vitals:    07/07/20 0600 07/07/20 0746 07/07/20 0900 07/07/20 0917   BP:  144/63     BP Location:  Left arm     Pulse:  70     Resp:  20     Temp: 98 3 °F (36 8 °C)     TempSrc:  Tympanic Core     SpO2:  93% 94% 94%   Weight: 134 kg (295 lb 6 7 oz)      Height:           Exam:  General: Alert awake and oriented  Heart:  Regular rate and rhythm, no murmurs   Respiratory effort:  Breathing comfortably on Bipap  Lungs:  Distant lung sounds, exam limited by body habitus    Lower Limbs: 1-2+ edema b/l            Telemetry:       Normal sinus rhythm, , Heart Rate 60's  Intermittent bursts of rapid a-fib     Medications:    Current Facility-Administered Medications:     acetaminophen (TYLENOL) tablet 650 mg, 650 mg, Oral, Q4H PRN, LETA Wilson-C    [] amiodarone (CORDARONE) 900 mg in dextrose 5 % 500 mL infusion, 1 mg/min, Intravenous, Continuous, Stopped at 20 1925 **FOLLOWED BY** amiodarone (CORDARONE) 900 mg in dextrose 5 % 500 mL infusion, 0 5 mg/min, Intravenous, Continuous, Juliana Ruiz MD, Last Rate: 16 7 mL/hr at 20 1336, 0 5 mg/min at 20 1336    apixaban (ELIQUIS) tablet 5 mg, 5 mg, Oral, BID, Ruirisl Will, DO, 5 mg at 20 0856    calcium carbonate (TUMS) chewable tablet 1,000 mg, 1,000 mg, Oral, TID PRN, Eden Baird PA-C    ceFAZolin (ANCEF) IVPB (premix) 1,000 mg 50 mL, 1,000 mg, Intravenous, Q8H, LETA Wilson-KOSTA, Last Rate: 100 mL/hr at 20 0553, 1,000 mg at 20 0553    cyanocobalamin (VITAMIN B-12) tablet 1,000 mcg, 1,000 mcg, Oral, Daily, LETA Wilson-KOSTA, 1,000 mcg at 20 0856    ferrous gluconate (FERGON) tablet 324 mg, 324 mg, Oral, Daily Before Breakfast, LETA Wilson-KOSTA, 324 mg at / 7758    folic acid (FOLVITE) tablet 1 mg, 1 mg, Oral, Daily, Dorita Baird PA-C, 1 mg at 20 0856    guaiFENesin (ROBITUSSIN) oral solution 200 mg, 200 mg, Oral, Q4H PRN, Juliana Ruiz MD, 200 mg at 20 1706    ipratropium-albuterol (DUO-NEB) 0 5-2 5 mg/3 mL inhalation solution 3 mL, 3 mL, Nebulization, Q6H, Juliana Ruiz MD, 3 mL at 20 0916    metoprolol (LOPRESSOR) injection 5 mg, 5 mg, Intravenous, Q6H PRN, Cory Alex,     metoprolol succinate (TOPROL-XL) 24 hr tablet 25 mg, 25 mg, Oral, Daily, Rujul Solis, DO, 25 mg at 07/07/20 0856    nitroglycerin (NITROSTAT) SL tablet 0 4 mg, 0 4 mg, Sublingual, Q5 Min PRN, Dorita Baird PA-C    nystatin (MYCOSTATIN) powder, , Topical, BID, Dorita Baird PA-C    ondansetron (ZOFRAN) injection 4 mg, 4 mg, Intravenous, Q6H PRN, Nichole Baird PA-C    potassium chloride (K-DUR,KLOR-CON) CR tablet 20 mEq, 20 mEq, Oral, TID With Meals, Rujul Solis, DO, 20 mEq at 07/06/20 1653    senna (SENOKOT) tablet 8 6 mg, 1 tablet, Oral, HS PRN, Gume Solomon PA-C      Labs/Data:        Results from last 7 days   Lab Units 07/07/20  0657 07/06/20  0534 07/05/20  1145   WBC Thousand/uL 6 48 6 07 6 96   HEMOGLOBIN g/dL 9 7* 9 3* 9 9*   HEMATOCRIT % 31 3* 30 7* 32 8*   PLATELETS Thousands/uL 126* 128* 141*     Results from last 7 days   Lab Units 07/07/20  0657 07/06/20  1910 07/06/20  0533 07/05/20  1145 07/05/20  0043 07/04/20  2049   POTASSIUM mmol/L 4 3 4 5 4 4 4 3  --   --    CHLORIDE mmol/L 102 103 104 103  --   --    CO2 mmol/L 32 34* 35* 33*  --   --    BUN mg/dL 19 18 16 14  --   --    TROPONIN I ng/mL  --   --   --  0 16* 0 26* 0 14*

## 2020-07-07 NOTE — PLAN OF CARE
Problem: Potential for Falls  Goal: Patient will remain free of falls  Description  INTERVENTIONS:  - Assess patient frequently for physical needs  -  Identify cognitive and physical deficits and behaviors that affect risk of falls    -  [place] fall precautions as indicated by assessment   - Educate patient/family on patient safety including physical limitations  - Instruct patient to call for assistance with activity based on assessment  - Modify environment to reduce risk of injury  - Consider OT/PT consult to assist with strengthening/mobility  Outcome: Progressing     Problem: Prexisting or High Potential for Compromised Skin Integrity  Goal: Skin integrity is maintained or improved  Description  INTERVENTIONS:  - Identify patients at risk for skin breakdown  - Assess and monitor skin integrity  - Assess and monitor nutrition and hydration status  - Monitor labs   - Assess for incontinence   - Turn and reposition patient  - Assist with mobility/ambulation  - Relieve pressure over bony prominences  - Avoid friction and shearing  - Provide appropriate hygiene as needed including keeping skin clean and dry  - Evaluate need for skin moisturizer/barrier cream  - Collaborate with interdisciplinary team   - Patient/family teaching  - Consider wound care consult   Outcome: Progressing     Problem: CARDIOVASCULAR - ADULT  Goal: Maintains optimal cardiac output and hemodynamic stability  Description  INTERVENTIONS:  - Monitor I/O, vital signs and rhythm  - Monitor for S/S and trends of decreased cardiac output  - Administer and titrate ordered vasoactive medications to optimize hemodynamic stability  - Assess quality of pulses, skin color and temperature  - Assess for signs of decreased coronary artery perfusion  - Instruct patient to report change in severity of symptoms  Outcome: Progressing  Goal: Absence of cardiac dysrhythmias or at baseline rhythm  Description  INTERVENTIONS:  - Continuous cardiac monitoring, vital signs, obtain 12 lead EKG if ordered  - Administer antiarrhythmic and heart rate control medications as ordered  - Monitor electrolytes and administer replacement therapy as ordered  Outcome: Progressing     Problem: RESPIRATORY - ADULT  Goal: Achieves optimal ventilation and oxygenation  Description  INTERVENTIONS:  - Assess for changes in respiratory status  - Assess for changes in mentation and behavior  - Position to facilitate oxygenation and minimize respiratory effort  - Oxygen administered by appropriate delivery if ordered  - Initiate smoking cessation education as indicated  - Encourage broncho-pulmonary hygiene including cough, deep breathe, Incentive Spirometry  - Assess the need for suctioning and aspirate as needed  - Assess and instruct to report SOB or any respiratory difficulty  - Respiratory Therapy support as indicated  Outcome: Progressing     Problem: METABOLIC, FLUID AND ELECTROLYTES - ADULT  Goal: Fluid balance maintained  Description  INTERVENTIONS:  - Monitor labs   - Monitor I/O and WT  - Instruct patient on fluid and nutrition as appropriate  - Assess for signs & symptoms of volume excess or deficit  Outcome: Progressing     Problem: SKIN/TISSUE INTEGRITY - ADULT  Goal: Skin integrity remains intact  Description  INTERVENTIONS  - Identify patients at risk for skin breakdown  - Assess and monitor skin integrity  - Assess and monitor nutrition and hydration status  - Monitor labs (i e  albumin)  - Assess for incontinence   - Turn and reposition patient  - Assist with mobility/ambulation  - Relieve pressure over bony prominences  - Avoid friction and shearing  - Provide appropriate hygiene as needed including keeping skin clean and dry  - Evaluate need for skin moisturizer/barrier cream  - Collaborate with interdisciplinary team (i e  Nutrition, Rehabilitation, etc )   - Patient/family teaching  Outcome: Progressing     Problem: MUSCULOSKELETAL - ADULT  Goal: Maintain or return mobility to safest level of function  Description  INTERVENTIONS:  - Assess patient's ability to carry out ADLs; assess patient's baseline for ADL function and identify physical deficits which impact ability to perform ADLs (bathing, care of mouth/teeth, toileting, grooming, dressing, etc )  - Assess/evaluate cause of self-care deficits   - Assess range of motion  - Assess patient's mobility  - Assess patient's need for assistive devices and provide as appropriate  - Encourage maximum independence but intervene and supervise when necessary  - Involve family in performance of ADLs  - Assess for home care needs following discharge   - Consider OT consult to assist with ADL evaluation and planning for discharge  - Provide patient education as appropriate  Outcome: Progressing

## 2020-07-07 NOTE — ASSESSMENT & PLAN NOTE
Acute kidney injury on CKD stage 3  Baseline creatinine is 1 2-1 6  Creatinine today is 1 71  Likely secondary to diuretics  Closely monitor renal function, if progressively increasing will consider nephrology consultation  Monitor BMP

## 2020-07-08 NOTE — PROGRESS NOTES
Progress Note - Ashish Single 1945, 75 y o  female MRN: [id number]    Unit/Bed#: E4 -01 Encounter: [id number]    Primary Care Provider: No primary care provider on file  Date and time admitted to hospital: 7/4/2020  5:10 PM        Sepsis [facility])  Assessment & Plan  POA criteria met with tachycardia, tachypnea, leukocytosis  Likely secondary to cellulitis  Patient with increased erythema, warmth on left shin  Continue Ancef, day 5  Also with fungal appearing rash under breast folds-nystatin powder b i d  Atrial fibrillation [facility])  Assessment & Plan  Patient went into AFib with RVR  Initially on amiodarone drip now transition to oral amiodarone 400 mg t i d   For 5 days then 200 mg daily  Continue Eliquis for anticoagulation--will need cm support for cost  Cardiology input appreciated    Stage 3 chronic kidney disease ([facility] )  Assessment & Plan  Acute kidney injury on CKD stage 3  Baseline creatinine is 1 4-1 7  Creatinine today is 1 7  Closely monitor renal function, if progressively increasing will consider nephrology consultation  Monitor BUN/creatinine  Avoid nephrotoxic agents, avoid hypotension      Chronic anemia  Assessment & Plan  Hemoglobin stable at 9 3, baseline 8-9  Continue folate, iron and vitamin B12 supplementation    Elevated troponin  Assessment & Plan  Troponin elevated to 0 16 at time of presentation  Likely type 2 non MI troponin elevation secondary to pulmonary edema and demand  Trend to peak, plateaued  Cardiology consult appreciated  EKG without acute ST changes  Monitor on telemetry    Acute on chronic respiratory failure with hypoxia and hypercapnia (HCC)  Assessment & Plan  Multifactorial likely secondary to chronic hypercapnia given elevated bicarb on BMP possibly secondary to up obesity hypoventilation and CHF exacerbation    -patient currently awake alert and oriented, stating she is a DNR DNI however is okay with pressors if needed  -will maintain on BiPAP PRN and monitor  -pulmonary follow-up appreciated  -BIPAP qualification prior to d/c  -continue oxygen supplementation, wean as tolerated    * Acute on chronic diastolic heart failure (HCC)  Assessment & Plan  Wt Readings from Last 3 Encounters:   20 134 kg (295 lb 6 7 oz)   20 115 kg (252 lb 10 4 oz)   20 111 kg (243 lb 11 2 oz)     Patient presented to ED in respiratory distress  Placed on BiPAP with improvement in work of breathing  Chest x-ray with significant interstitial edema  Weight up 50 lb since discharge in May, monitor daily weights  Echo showing EF of 55% with grade 2 diastolic dysfunction, mild to moderate mitral and tricuspid regurg  Cardiology consultation appreciated- Continue on Lasix drip  Net -7 L since admission   Daily weights and monitor intake and output, fluid restriction, salt restriction      VTE Pharmacologic Prophylaxis:   Pharmacologic: Apixaban (Eliquis)  Mechanical VTE Prophylaxis in Place: Yes    Patient Centered Rounds: I have performed bedside rounds with nursing staff today  Discussions with Specialists or Other Care Team Provider:  None    Education and Discussions with Family / Patient:  Discussed treatment plan with patient and daughter    Time Spent for Care: 45 minutes  More than 50% of total time spent on counseling and coordination of care as described above  Current Length of Stay: 4 day(s)    Current Patient Status: Inpatient   Certification Statement: The patient will continue to require additional inpatient hospital stay due to IV diuresis    Discharge Plan:  Pending hospital course    Code Status: Level 3 - DNAR and DNI      Subjective:   No acute overnight events  This morning patient states she still feels significantly short of breath  Otherwise has no complaints      Objective:     Vitals:   Temp (24hrs), Av 3 °F (36 3 °C), Min:97 °F (36 1 °C), Max:97 6 °F (36 4 °C)    Temp:  [97 °F (36 1 °C)-97 6 °F (36 4 °C)] 97 °F (36 1 °C)  HR:  [address]  Resp:  [18] 18  BP: (118-134)/(56-72) 128/66  SpO2:  [92 %-98 %] 92 %  Body mass index is 55 82 kg/m²  Input and Output Summary (last 24 hours): Intake/Output Summary (Last 24 hours) at 7/8/2020 1617  Last data filed at 7/8/2020 1501  Gross per 24 hour   Intake 290 ml   Output 3855 ml   Net -3565 ml       Physical Exam:     Physical Exam   Constitutional: She is oriented to person, place, and time  She appears well-developed and well-nourished  HENT:   Head: Normocephalic and atraumatic  Mouth/Throat: Oropharynx is clear and moist    Eyes: Pupils are equal, round, and reactive to light  Conjunctivae are normal    Neck: Neck supple  JVD present  Cardiovascular: Normal rate, regular rhythm, normal heart sounds and intact distal pulses  Pulmonary/Chest:   Tachypneic, coarse crackles throughout, fine expiratory wheeze   Abdominal: Soft  Bowel sounds are normal    Musculoskeletal: She exhibits edema  She exhibits no tenderness  Neurological: She is alert and oriented to person, place, and time  Skin: Skin is warm and dry  Capillary refill takes less than 2 seconds  Psychiatric:   Flap depressed affect   Nursing note and vitals reviewed        Additional Data:     Labs:    Results from last 7 days   Lab Units 07/08/20  0453   WBC Thousand/uL 6 05   HEMOGLOBIN g/dL 9 5*   HEMATOCRIT % 31 0*   PLATELETS Thousands/uL 121*   NEUTROS PCT % 70   LYMPHS PCT % 18   MONOS PCT % 10   EOS PCT % 2     Results from last 7 days   Lab Units 07/08/20  0453  07/04/20  1732   SODIUM mmol/L 140   < > 140   POTASSIUM mmol/L 3 9   < > 3 8   CHLORIDE mmol/L 101   < > 104   CO2 mmol/L 33*   < > 32   BUN mg/dL 22   < > 14   CREATININE mg/dL 1 70*   < > 1 38*   ANION GAP mmol/L 6   < > 4   CALCIUM mg/dL 8 7   < > 8 8   ALBUMIN g/dL  --   --  2 6*   TOTAL BILIRUBIN mg/dL  --   --  1 63*   ALK PHOS U/L  --   --  166*   ALT U/L  --   --  19   AST U/L  --   --  38   GLUCOSE RANDOM mg/dL 91   < > 140    < > = values in this interval not displayed  Results from last 7 days   Lab Units 07/04/20  1715   INR  1 09             Results from last 7 days   Lab Units 07/04/20  1918 07/04/20  1729   LACTIC ACID mmol/L 1 5 2 5*   PROCALCITONIN ng/ml  --  0 08           * I Have Reviewed All Lab Data Listed Above  * Additional Pertinent Lab Tests Reviewed: All Labs Within Last 24 Hours Reviewed    Imaging:    Imaging Reports Reviewed Today Include:  None  Imaging Personally Reviewed by Myself Includes:  None    Recent Cultures (last 7 days):     Results from last 7 days   Lab Units 07/04/20  1728 07/04/20  1715   BLOOD CULTURE  No Growth at 72 hrs  No Growth at 72 hrs         Last 24 Hours Medication List:     Current Facility-Administered Medications:  acetaminophen 650 mg Oral Q4H PRN Roberth Dumont PA-C    amiodarone 400 mg Oral TID Sandi Kristal, DO    apixaban 5 mg Oral BID Rujul Solis, DO    calcium carbonate 1,000 mg Oral TID PRN Willma Shallow CHRISTOPHER Baird    cefazolin 1,000 mg Intravenous Q8H Dorita LETA Baird-C Last Rate: Stopped (07/08/20 1246)   cyanocobalamin 1,000 mcg Oral Daily Dorita Smudde, PA-C    ferrous gluconate 324 mg Oral Daily Before Breakfast Dorita Smduncan, PA-KOSTA    folic acid 1 mg Oral Daily Dorita Smudde, PA-C    furosemide 10 mg/hr Intravenous Continuous Sandi Kristal, DO Last Rate: 10 mg/hr (07/07/20 1202)   guaiFENesin 200 mg Oral Q4H PRN Rene Jhaveri MD    ipratropium-albuterol 3 mL Nebulization Q6H PRN Desiree Thomas MD    metoprolol 5 mg Intravenous Q6H PRN Sandi Kristal, DO    metoprolol succinate 25 mg Oral Daily Luis Carlos Mcarthur PA-C    nitroglycerin 0 4 mg Sublingual Q5 Min PRN Dorita SmuddeCHRISTOPHER    nystatin  Topical BID Dorita Smvesnade, PA-KOSTA    ondansetron 4 mg Intravenous Q6H PRN Dorita Oli, PADAYNA    potassium chloride 20 mEq Oral TID With Meals Rujul Solis, DO    senna 1 tablet Oral HS PRN LETA Mei-C         Today, Patient Was Seen By: Desriee Thomas MD    ** Please Note: Dictation voice to text software may have been used in the creation of this document   **

## 2020-07-08 NOTE — ASSESSMENT & PLAN NOTE
Multifactorial likely secondary to chronic hypercapnia given elevated bicarb on BMP possibly secondary to up obesity hypoventilation and CHF exacerbation    -patient currently awake alert and oriented, stating she is a DNR DNI however is okay with pressors if needed  -will maintain on BiPAP PRN and monitor  -pulmonary follow-up appreciated  -BIPAP qualification prior to d/c  -continue oxygen supplementation, wean as tolerated

## 2020-07-08 NOTE — ASSESSMENT & PLAN NOTE
Wt Readings from Last 3 Encounters:   07/08/20 134 kg (295 lb 6 7 oz)   05/24/20 115 kg (252 lb 10 4 oz)   04/25/20 111 kg (243 lb 11 2 oz)     Patient presented to ED in respiratory distress  Placed on BiPAP with improvement in work of breathing  Chest x-ray with significant interstitial edema  Weight up 50 lb since discharge in May, monitor daily weights  Echo showing EF of 55% with grade 2 diastolic dysfunction, mild to moderate mitral and tricuspid regurg  Cardiology consultation appreciated- Continue on Lasix drip  Net -7 L since admission   Daily weights and monitor intake and output, fluid restriction, salt restriction

## 2020-07-08 NOTE — PROGRESS NOTES
Cardiology Progress Note   MD Chavez Barker MD Emmitt Jain, DO, MD Janet Jonas DO, Kem Cifuentes DO, [facility]  ----------------------------------------------------------------  [address] [facility]  [address] [place] [address], [address] [facility] 75 [facility]  female MRN: [id number]  Unit/Bed#: E4 -01 Encounter: [id number]      ASSESSMENT:   Acute on chronic diastolic heart failure  Acute on chronic hypoxic respiratory failure  Non MI troponin elevation secondary to hypoxic respiratory failure and diastolic heart failure  LVEF 55% with grade 2 diastolic dysfunction, mild-mod MR and TR with mild-to-moderate pulmonary hypertension w/ PASP 53 mmHg, March 2020  Paroxysmal atrial fibrillation with rapid ventricular response overnight - currently in sinus rhythm on amiodarone  Acute kidney injury on chronic kidney disease stage 3  Mild-to-moderate pulmonary hypertension  Obesity  Anemia  Hypoalbuminemia     PLAN:  Patient is net -7 52 L since admission  Clinically she is improving and is now off of BiPAP on to 5 L nasal cannula  Continue Lasix drip with heard significant anasarca  Probable need for permissive azotemia with her marked edema  Maintaining sinus rhythm overnight  Continue amiodarone load with 400 mg t i d  For additional 40 days then 200 mg daily  Eliquis for anticoagulation  BiPAP p r n  Incentive spirometry  Keep potassium greater than 4 and magnesium greater than 2  Guarded prognosis    Signed: Tavo Elder DO, [facility]      History of Present Illness:  Patient seen and examined  Denies chest pain, pressure, tightness or squeezing  Admits to improved shortness of breath  Lower extremity swelling is also reportedly somewhat improved  Now off BiPAP onto nasal cannula  Review of Systems:  Review of Systems   Constitution: Negative for decreased appetite, fever, weight gain and weight loss     HENT: Negative for congestion and sore throat  Eyes: Negative for visual disturbance  Cardiovascular: Positive for dyspnea on exertion and leg swelling  Negative for chest pain, near-syncope and palpitations  Respiratory: Positive for shortness of breath  Negative for cough  Hematologic/Lymphatic: Negative for bleeding problem  Skin: Negative for rash  Musculoskeletal: Negative for myalgias and neck pain  Gastrointestinal: Negative for abdominal pain and nausea  Neurological: Negative for light-headedness and weakness  Psychiatric/Behavioral: Negative for depression  Allergies   Allergen Reactions    Sulfa Antibiotics Hallucinations       No current facility-administered medications on file prior to encounter        Current Outpatient Medications on File Prior to Encounter   Medication Sig    apixaban (ELIQUIS) 5 mg Take 1 tablet (5 mg total) by mouth 2 (two) times a day    aspirin 81 mg chewable tablet Chew 1 tablet (81 mg total) daily    cyanocobalamin (VITAMIN B-12) 1000 MCG tablet Take 1 tablet (1,000 mcg total) by mouth daily    ferrous gluconate (FERGON) 324 mg tablet Take 1 tablet (324 mg total) by mouth daily before breakfast    folic acid (FOLVITE) 1 mg tablet Take 1 tablet (1 mg total) by mouth daily    furosemide (LASIX) 40 mg tablet Take 1 tablet (40 mg total) by mouth daily    metoprolol succinate (TOPROL-XL) 50 mg 24 hr tablet Take 1 tablet (50 mg total) by mouth daily    nitroglycerin (NITROSTAT) 0 4 mg SL tablet Place 1 tablet (0 4 mg total) under the tongue every 5 (five) minutes as needed for chest pain          Current Facility-Administered Medications:  acetaminophen 650 mg Oral Q4H PRN Dorita Baird PA-C    amiodarone 400 mg Oral TID Cory Alex DO    apixaban 5 mg Oral BID Shalom Solis DO    calcium carbonate 1,000 mg Oral TID PRN Nichole Baird PA-C    cefazolin 1,000 mg Intravenous Q8H Dorita Baird PA-C Last Rate: 1,000 mg (07/08/20 1146)   cyanocobalamin 1,000 mcg Oral Daily Dorita Baird PA-KOSTA    ferrous gluconate 324 mg Oral Daily Before Breakfast Dorita Smudde, PA-C    folic acid 1 mg Oral Daily Dorita Smvesnade, PA-KOSTA    furosemide 10 mg/hr Intravenous Continuous Eduardo Loja DO Last Rate: 10 mg/hr (07/07/20 1202)   guaiFENesin 200 mg Oral Q4H PRN Wendy Melendez MD    ipratropium-albuterol 3 mL Nebulization Q6H PRN Rock Garza MD    metoprolol 5 mg Intravenous Q6H PRN Eduardo Loja DO    metoprolol succinate 25 mg Oral Daily Luis Carlos Mcarthur PA-C    nitroglycerin 0 4 mg Sublingual Q5 Min PRN Delbra Brad Smudde, PA-KOSTA    nystatin  Topical BID Dorita Smudde, PA-KOSTA    ondansetron 4 mg Intravenous Q6H PRN Dorita Smudde, PA-C    potassium chloride 20 mEq Oral TID With Meals Rujul Solis,     senna 1 tablet Oral HS PRN LETA Keller-KOSTA          furosemide 10 mg/hr Last Rate: 10 mg/hr (07/07/20 1202)       Vitals:    07/08/20 0318 07/08/20 0549 07/08/20 0701 07/08/20 1100   BP: 128/56  128/68 130/65   BP Location: Left arm  Right arm Right arm   Pulse: 62  75 73   Resp:   18 18   Temp: 97 5 °F (36 4 °C)  (!) 97 °F (36 1 °C)    TempSrc: Tympanic  Tympanic    SpO2: 96%  93%    Weight:  134 kg (295 lb 6 7 oz)     Height:             Intake/Output Summary (Last 24 hours) at 7/8/2020 1226  Last data filed at 7/8/2020 1146  Gross per 24 hour   Intake 290 ml   Output 3755 ml   Net -3465 ml       Weight change: 0 kg (0 lb)    PHYSICAL EXAMINATION:  Gen: Awake, Alert, NAD  HEENT: AT/NC, Anicteric, mmm  Neck: Supple, JVP difficult to assess due to body habitus  Resp:  Decreased breath sounds with bilateral wheezes  CV: RRR +S1, S2, No m/r/g  Abd: Soft, NT/ND + BS  Ext: warm, +2 pitting edema of lower extremities bilaterally with bilateral upper extremity nonpitting edema  Neuro:  Follows commands, moves all extermities  Psych: Appropriate affect    Lab Results:  Results from last 7 days   Lab Units 07/08/20  0453   WBC Thousand/uL 6 05   HEMOGLOBIN g/dL 9 5*   HEMATOCRIT % 31 0*   PLATELETS Thousands/uL 121* Results from last 7 days   Lab Units 07/08/20  0453  07/04/20  1732   POTASSIUM mmol/L 3 9   < > 3 8   CHLORIDE mmol/L 101   < > 104   CO2 mmol/L 33*   < > 32   BUN mg/dL 22   < > 14   CREATININE mg/dL 1 70*   < > 1 38*   CALCIUM mg/dL 8 7   < > 8 8   ALK PHOS U/L  --   --  166*   ALT U/L  --   --  19   AST U/L  --   --  38    < > = values in this interval not displayed  No results found for: Annabella Chen      Results from last 7 days   Lab Units 07/05/20  1145 07/05/20  0043 07/04/20  2049   TROPONIN I ng/mL 0 16* 0 26* 0 14*     Results from last 7 days   Lab Units 07/04/20  1715   INR  1 09       Tele: SR    This note was completed in part utilizing [employer] Fluency Direct Software  Grammatical errors, random word insertions, spelling mistakes, and incomplete sentences may be an occasional consequence of this system secondary to software limitations, ambient noise, and hardware issues  If you have any questions or concerns about the content, text, or information contained within the body of this dictation, please contact the provider for clarification

## 2020-07-08 NOTE — ASSESSMENT & PLAN NOTE
Acute kidney injury on CKD stage 3  Baseline creatinine is 1 4-1 7  Creatinine today is 1 7  Closely monitor renal function, if progressively increasing will consider nephrology consultation  Monitor BUN/creatinine  Avoid nephrotoxic agents, avoid hypotension

## 2020-07-08 NOTE — ASSESSMENT & PLAN NOTE
Troponin elevated to 0 16 at time of presentation  Likely type 2 non MI troponin elevation secondary to pulmonary edema and demand  Trend to peak, plateaued  Cardiology consult appreciated  EKG without acute ST changes  Monitor on telemetry

## 2020-07-08 NOTE — ASSESSMENT & PLAN NOTE
POA criteria met with tachycardia, tachypnea, leukocytosis  Likely secondary to cellulitis  Patient with increased erythema, warmth on left shin  Continue Ancef, day 5  Also with fungal appearing rash under breast folds-nystatin powder b i d

## 2020-07-09 NOTE — ASSESSMENT & PLAN NOTE
POA criteria met with tachycardia, tachypnea, leukocytosis  Likely secondary to cellulitis  Patient with increased erythema, warmth on left shin  Continue Ancef, day 5  Can switch to oral tomorrow  Also with fungal appearing rash under breast folds-nystatin powder b i d

## 2020-07-09 NOTE — PROGRESS NOTES
Progress Note - Cardiology   Glenis Gomez 75 y o  female MRN: [id number]  Unit/Bed#: E4 -01 Encounter: [id number]          Asessment/Plan  Chronic and acute hypoxic and hypercarbic respiratory failure:     Poly factorial related to  pulmonary hypertension, CHF, obesity, deconditioning, and probable JESIKA   Home oxygen requirement 2-4 liters/minute   Chronic and acute diastolic CHF  Moderate Pulmonary hypertension:   WHO group II & III   PASP 53 per ECHO 3/2020  Valvular heart disease:  Mild-moderate MR and TR per echo 3/2020  Paroxysmal atrial fibrillation:  Had had rapid ventricular rate though now maintaining sinus rhythm on amiodarone  Left lower extremity cellulitis:   Ancef day #4 per primary team  CKD III:   Baseline creatinine 1 2-1 6    Bed weight 125 kg (looks to have been 137 kg on admission with report of 108 kg - 3/30/2020)  I&O negative 8 4L through 7/8  BUN 22 with creatinine 1 64 (stable)    · Lasix drip discontinue last evening ~~> will resume oral diuretic (begin torsemide 20 mg b i d )  · Continue Eliquis anticoagulation  · Continue Toprol  · Continue oral loading of amiodarone with suggest to reduce dose to 200 mg daily on 7/11  · Pulmonary following with anticipation of pre discharge assessment for home BiPAP need  Decision regarding formal outpatient sleep study need deferred to them and PCP  · Seems to have limited health literacy  · When last seen by physical therapy 5/24/2020 during a prior hospitalization she was advised post acute rehab services which she declined instead preferring to go home    Presently the patient reports that for the last few months she has spent much of her time in bed using a bedside commode noting that earlier in the year she was capable of ambulating room to room in her house ~~> seems the patient would presently benefit from formal rehabilitation efforts though we defer to primary service in this manner      Subjective:  No specific complaints    Vitals:  Vitals:    07/09/20 0539 07/09/20 0540   Weight: 125 kg (275 lb 5 7 oz) 125 kg (275 lb 5 7 oz)   ,  Vitals:    07/09/20 0257 07/09/20 0539 07/09/20 0540 07/09/20 0848   BP: 115/58   108/58   BP Location: Left arm   Left arm   Pulse: 62   74   Resp: 18   18   Temp: 97 9 °F (36 6 °C)   97 7 °F (36 5 °C)   TempSrc: Temporal   Temporal   SpO2: 93%   93%   Weight:  125 kg (275 lb 5 7 oz) 125 kg (275 lb 5 7 oz)    Height:           Exam:  General:  Alert, cooperative, and comfortable-appearing    Heart:  Distant and regular  Respiratory effort:  Limited excursion with decreased air exchange  Lungs:  Few bibasilar rales - perhaps under expressed given modest air exchange  Lower Limbs:  Still with at least 2+ dependent edema             Medications:    Current Facility-Administered Medications:     acetaminophen (TYLENOL) tablet 650 mg, 650 mg, Oral, Q4H PRN, Darrin Sands PA-C    amiodarone tablet 400 mg, 400 mg, Oral, TID, [place] Antonio, DO, 400 mg at 07/09/20 0855    apixaban (ELIQUIS) tablet 5 mg, 5 mg, Oral, BID, Rujul Solis, DO, 5 mg at 07/09/20 0856    calcium carbonate (TUMS) chewable tablet 1,000 mg, 1,000 mg, Oral, TID PRN, Eduard Baird PA-C    ceFAZolin (ANCEF) IVPB (premix) 1,000 mg 50 mL, 1,000 mg, Intravenous, Q8H, Dorita Baird PA-C, Last Rate: 100 mL/hr at 07/09/20 0520, 1,000 mg at 07/09/20 0520    cyanocobalamin (VITAMIN B-12) tablet 1,000 mcg, 1,000 mcg, Oral, Daily, Dorita Baird PA-C, 1,000 mcg at 07/09/20 0856    ferrous gluconate (FERGON) tablet 324 mg, 324 mg, Oral, Daily Before Breakfast, Dorita Baird PA-C, 324 mg at 09/73/98 5464    folic acid (FOLVITE) tablet 1 mg, 1 mg, Oral, Daily, Dorita Baird PA-C, 1 mg at 07/09/20 0856    guaiFENesin (ROBITUSSIN) oral solution 200 mg, 200 mg, Oral, Q4H PRN, Anay Levy MD, 200 mg at 07/08/20 1143    ipratropium-albuterol (DUO-NEB) 0 5-2 5 mg/3 mL inhalation solution 3 mL, 3 mL, Nebulization, Q6H PRN, Zenobia Shipley, MD    metoprolol (LOPRESSOR) injection 5 mg, 5 mg, Intravenous, Q6H PRN, Eduardo Schwarz DO    metoprolol succinate (TOPROL-XL) 24 hr tablet 25 mg, 25 mg, Oral, Daily, Heidi Mcarthur PA-C, 25 mg at 07/09/20 0856    nitroglycerin (NITROSTAT) SL tablet 0 4 mg, 0 4 mg, Sublingual, Q5 Min PRN, Dorita Baird PA-C    nystatin (MYCOSTATIN) powder, , Topical, BID, Dorita Baird PA-C    ondansetron (ZOFRAN) injection 4 mg, 4 mg, Intravenous, Q6H PRN, Neida Baird PA-C    potassium chloride (K-DUR,KLOR-CON) CR tablet 20 mEq, 20 mEq, Oral, TID With Meals, Raeannl Solis, DO, 20 mEq at 07/09/20 0856    senna (SENOKOT) tablet 8 6 mg, 1 tablet, Oral, HS PRN, Emiliano Navarrete PA-C      Labs/Data:        Results from last 7 days   Lab Units 07/08/20  0453 07/07/20  0657 07/06/20  0534   WBC Thousand/uL 6 05 6 48 6 07   HEMOGLOBIN g/dL 9 5* 9 7* 9 3*   HEMATOCRIT % 31 0* 31 3* 30 7*   PLATELETS Thousands/uL 121* 126* 128*     Results from last 7 days   Lab Units 07/09/20  0535 07/08/20  0453 07/07/20  0657  07/05/20  1145 07/05/20  0043 07/04/20  2049   POTASSIUM mmol/L 3 8 3 9 4 3   < > 4 3  --   --    CHLORIDE mmol/L 101 101 102   < > 103  --   --    CO2 mmol/L 39* 33* 32   < > 33*  --   --    BUN mg/dL 22 22 19   < > 14  --   --    TROPONIN I ng/mL  --   --   --   --  0 16* 0 26* 0 14*    < > = values in this interval not displayed

## 2020-07-09 NOTE — PLAN OF CARE
Problem: Potential for Falls  Goal: Patient will remain free of falls  Description  INTERVENTIONS:  - Assess patient frequently for physical needs  -  Identify cognitive and physical deficits and behaviors that affect risk of falls    -  [place] fall precautions as indicated by assessment   - Educate patient/family on patient safety including physical limitations  - Instruct patient to call for assistance with activity based on assessment  - Modify environment to reduce risk of injury  - Consider OT/PT consult to assist with strengthening/mobility  Outcome: Progressing     Problem: Prexisting or High Potential for Compromised Skin Integrity  Goal: Skin integrity is maintained or improved  Description  INTERVENTIONS:  - Identify patients at risk for skin breakdown  - Assess and monitor skin integrity  - Assess and monitor nutrition and hydration status  - Monitor labs   - Assess for incontinence   - Turn and reposition patient  - Assist with mobility/ambulation  - Relieve pressure over bony prominences  - Avoid friction and shearing  - Provide appropriate hygiene as needed including keeping skin clean and dry  - Evaluate need for skin moisturizer/barrier cream  - Collaborate with interdisciplinary team   - Patient/family teaching  - Consider wound care consult   Outcome: Progressing     Problem: CARDIOVASCULAR - ADULT  Goal: Maintains optimal cardiac output and hemodynamic stability  Description  INTERVENTIONS:  - Monitor I/O, vital signs and rhythm  - Monitor for S/S and trends of decreased cardiac output  - Administer and titrate ordered vasoactive medications to optimize hemodynamic stability  - Assess quality of pulses, skin color and temperature  - Assess for signs of decreased coronary artery perfusion  - Instruct patient to report change in severity of symptoms  Outcome: Progressing  Goal: Absence of cardiac dysrhythmias or at baseline rhythm  Description  INTERVENTIONS:  - Continuous cardiac monitoring, vital signs, obtain 12 lead EKG if ordered  - Administer antiarrhythmic and heart rate control medications as ordered  - Monitor electrolytes and administer replacement therapy as ordered  Outcome: Progressing     Problem: RESPIRATORY - ADULT  Goal: Achieves optimal ventilation and oxygenation  Description  INTERVENTIONS:  - Assess for changes in respiratory status  - Assess for changes in mentation and behavior  - Position to facilitate oxygenation and minimize respiratory effort  - Oxygen administered by appropriate delivery if ordered  - Initiate smoking cessation education as indicated  - Encourage broncho-pulmonary hygiene including cough, deep breathe, Incentive Spirometry  - Assess the need for suctioning and aspirate as needed  - Assess and instruct to report SOB or any respiratory difficulty  - Respiratory Therapy support as indicated  Outcome: Progressing     Problem: METABOLIC, FLUID AND ELECTROLYTES - ADULT  Goal: Fluid balance maintained  Description  INTERVENTIONS:  - Monitor labs   - Monitor I/O and WT  - Instruct patient on fluid and nutrition as appropriate  - Assess for signs & symptoms of volume excess or deficit  Outcome: Progressing     Problem: SKIN/TISSUE INTEGRITY - ADULT  Goal: Skin integrity remains intact  Description  INTERVENTIONS  - Identify patients at risk for skin breakdown  - Assess and monitor skin integrity  - Assess and monitor nutrition and hydration status  - Monitor labs (i e  albumin)  - Assess for incontinence   - Turn and reposition patient  - Assist with mobility/ambulation  - Relieve pressure over bony prominences  - Avoid friction and shearing  - Provide appropriate hygiene as needed including keeping skin clean and dry  - Evaluate need for skin moisturizer/barrier cream  - Collaborate with interdisciplinary team (i e  Nutrition, Rehabilitation, etc )   - Patient/family teaching  Outcome: Progressing     Problem: MUSCULOSKELETAL - ADULT  Goal: Maintain or return mobility to safest level of function  Description  INTERVENTIONS:  - Assess patient's ability to carry out ADLs; assess patient's baseline for ADL function and identify physical deficits which impact ability to perform ADLs (bathing, care of mouth/teeth, toileting, grooming, dressing, etc )  - Assess/evaluate cause of self-care deficits   - Assess range of motion  - Assess patient's mobility  - Assess patient's need for assistive devices and provide as appropriate  - Encourage maximum independence but intervene and supervise when necessary  - Involve family in performance of ADLs  - Assess for home care needs following discharge   - Consider OT consult to assist with ADL evaluation and planning for discharge  - Provide patient education as appropriate  Outcome: Progressing Path Notes (To The Dermatopathologist): Please check margins.

## 2020-07-09 NOTE — PHYSICAL THERAPY NOTE
PT EVALUATION    Pt  Name: Salvatore Galdamez  Pt  Age: 75 y o  MRN: [id number]  LENGTH OF STAY: 5    Patient Active Problem List   Diagnosis    Acute on chronic respiratory failure with hypoxia and hypercapnia (HCC)    Acute on chronic diastolic heart failure (HCC)    Elevated troponin    Chronic anemia    Thrombocytopenia (HCC)    Vitamin B12 deficiency    Cirrhosis of liver without ascites (HCC)    Cholelithiasis    Ventricular tachycardia (HCC)    Stage 3 chronic kidney disease (HCC)    Onychogryphosis    Atrial fibrillation (HCC)    Hypotension    Pulmonary hypertension (HCC)    Sepsis ([facility] )       Admitting Diagnoses:   Shortness of breath [R06 02]  Acute respiratory distress [R06 03]  Hypoxia [R09 02]  Acute exacerbation of CHF (congestive heart failure) ([facility] ) [I50 9]  Elevated troponin I level [R79 89]    Past Medical History:   Diagnosis Date    Atrial fibrillation (HCC)     Chronic anemia     CKD (chronic kidney disease)     Diastolic CHF (HCC)        Past Surgical History:   Procedure Laterality Date    APPENDECTOMY      HYSTERECTOMY      TONSILLECTOMY         Imaging Studies:  XR chest portable   Final Result by Maryam Encarnacion MD (07/08 0920)      Cardiomegaly  Bilateral small effusions  Right lung base atelectasis or infiltrate cannot be excluded  Workstation performed: [id number]         XR chest portable - 1 view   ED Interpretation by Tavo Marshall DO (07/04 8612)   Abnormal   Cardiomeg, increased markings/ CHF      Final Result by Humberto Freitas MD (07/05 9895)      Cardiomegaly  CHF  Possible small right pleural effusion  Workstation performed: [id number]              07/09/20 1550   Note Type   Note type Eval only   Pain Assessment   Pain Score No Pain   Home Living   Type of [address] [place] [address] Two level;1/2 bath on main level;Bed/bath upstairs;Stairs to enter with rails; Able to live on main level with bedroom/bathroom  (4STE; 1st flr set up w/ BSC)   Bathroom Shower/Tub Tub/shower unit  (per pt, sponge bathes only)   Bathroom Toilet Standard   Bathroom Equipment Commode   Home Equipment Walker;Cane   Prior Function   Level of [place] Needs assistance with ADLs and functional mobility  (w/ RW)   Lives With Daughter; Other (Comment)  (adult grandson)   Receives Help From Family   ADL Assistance Needs assistance   Falls in the last 6 months 0  (pt denies)   Comments Pt reports that she requires (A) w/ overall ADL's & only able to ambulate short distances approx  20' at a time  Restrictions/Precautions   Weight Bearing Precautions Per Order No   Other Precautions Cognitive; Chair Alarm; Bed Alarm;Multiple lines;Telemetry;O2;Fall Risk  (4L O2 NC)   General   Family/Caregiver Present No   Cognition   Overall Cognitive Status Impaired   Arousal/Participation Alert   Orientation Level Oriented to person;Oriented to place   Following Commands Follows one step commands without difficulty   RUE Assessment   RUE Assessment WFL  (4-/5 grossly)   LUE Assessment   LUE Assessment WFL  (4-/5 grossly)   RLE Assessment   RLE Assessment WFL  (4-/5 grossly except hip 3-/5; (+) edema)   LLE Assessment   LLE Assessment WFL  (4-/5 grossly except hip 3-/5; (+) edema)   Bed Mobility   Rolling R 3  Moderate assistance   Additional items Assist x 1;HOB elevated; Bedrails; Increased time required;Verbal cues;LE management   Supine to Sit 3  Moderate assistance   Additional items Assist x 1;HOB elevated; Bedrails; Increased time required;Verbal cues;LE management   Sit to Supine 2  Maximal assistance   Additional items Assist x 1; Increased time required;Verbal cues;LE management   Additional Comments cues for techniques & safety; require Ax2 to boost up to [facility]   Transfers   Sit to Stand 2  Maximal assistance   Additional items Assist x 1;Bedrails; Increased time required;Verbal cues   Stand to Sit 2  Maximal assistance   Additional items Assist x 1; Increased time required;Verbal cues   Additional Comments cues for techniques & safety   Ambulation/Elevation   Gait pattern Forward Flexion; Wide LIZETT; Decreased foot clearance;Shuffling; Short stride; Step to;Excessively slow   Gait Assistance 2  Maximal assist   Additional items Assist x 1;Verbal cues; Tactile cues   Assistive Device Rolling walker   Distance 2 lateral steps to [facility]   Balance   Static Sitting Fair   Static Standing Poor  (w/ RW)   Ambulatory Poor -  (w/ RW)   Endurance Deficit   Endurance Deficit Yes   Endurance Deficit Description weakness; fatigue; SOB   Activity Tolerance   Activity Tolerance Patient limited by fatigue;Treatment limited secondary to medical complications (Comment)   Nurse Made Aware LOVE Rondon   Assessment   Prognosis Fair   Problem List Decreased strength;Decreased endurance; Impaired balance;Decreased mobility; Decreased cognition;Obesity   Assessment Pt  74 y  o female presented w/ SOB & worsening BLE edema  Pt admitted for Acute on chronic diastolic heart failure (HCC) w/ acute on chronic respiratory failure w/ hypoxia & hypercapnia, LLE cellulitis, type II MI 2* to pulmonary edema, moderate pulmonary HTN, sepsis & SACHA  Pt referred to PT for mobility assessment & D/C planning w/ orders of up as tolerated  PTA, pt reports that she require assistance w/ overall ADL's & only able to ambulate w/ RW approx  20' at a time  On eval, pt demonstrate dec mobility, balance, endurance & amb  Pt require mod/maxAx1 for bed mobility, transfers & amb w/ RW + cues for techniques  Dec amb tolerance 2* to inc fatigue, SOB & weakness  Gait deviations as above, slow & unsteady w/ dec foot clearance & strides but no gross LOB noted  (+) SOB during activity but relieved w/ rest  SpO2 between 89-94% w/ 4L O2 during activity  No dizziness reported t/o session  Pt incontinent of urine, assisted w/ bed pad & gown change   [facility] [place] most recent vital signs as follows: BP 95/53 (BP Location: Left arm)   Pulse 66   Temp 98 °F (36 7 °C) (Temporal)   Resp 18   Ht 5' 1" (1 549 m)   Wt 125 kg (275 lb 5 7 oz)   SpO2 96%   BMI 52 03 kg/m²   At end of session, pt back in bed w/o issues, call bell & phone in reach, bed alarm activated  Fall precautions reinforced w/ good understanding  Pt functioning below baseline hence will continue skilled PT to improve function & safety  At this time, due to above mentioned deficits, inaccessible home & high risk for falls, pt will benefit from inpt rehab at D/C   to follow  Nsg staff may utilized quick move device to mobilized pt (OOB in chair for all meals) as tolerated to prevent further decline in function  Nsg notified  Barriers to Discharge Inaccessible home environment   Barriers to Discharge Comments (+) stairs   Goals   Patient Goals to get better   STG Expiration Date 07/23/20   Short Term Goal #1 Goals to be met in 14 days; pt will be able to: 1) inc strength & balance by 1/2 grade to improve overall functional mobility & dec fall risk; 2) inc bed mobility to minAx1 for pt to be able to get in/OOB safely w/ proper techniques 100% of the time, to dec caregiver burden & safely function at home; 3) inc transfers to minAx1 for pt to transition safely from one surface to another w/o % of the time, to dec caregiver burden & safely function at home; 4) inc amb w/ RW approx  >20' w/ minAx1 for pt to ambulate as tolerated w/o any % of the time, to dec caregiver burden & safely function at home; 5) inc barthel score to 60 to decrease overall risk for falls; 6) negotiate stairs w/ minAx1 for inc safety during stair mgt inside/outside of home & dec caregiver burden; 7) pt/caregiver ed   PT Treatment Day 0   Plan   Treatment/Interventions Functional transfer training;LE strengthening/ROM; Elevations; Therapeutic exercise; Endurance training;Patient/family training;Bed mobility;Gait training;Spoke to nursing   PT Frequency Other (Comment)  (3-5x/wk)   Recommendation   PT Discharge Recommendation Post-Acute Rehabilitation Services   Equipment Recommended Walker; Other (Comment)  (Nsg staff may use quick move device for transfers OOB<>chair)   PT - OK to Discharge Yes  (to inpt rehab when medically cleared)   Barthel Index   Feeding 10   Bathing 0   Grooming Score 5   Dressing Score 5   Bladder Score 0   Bowels Score 10   Toilet Use Score 5   Transfers (Bed/Chair) Score 5   Mobility (Level Surface) Score 0   Stairs Score 0   Barthel Index Score 40   Hx/personal factors: co-morbidities, inaccessible home, advanced age, mutliple lines, telemetry, use of AD, dec cognition, fall risk, assist w/ ADL's, obesity and O2  Examination: dec mobility, dec balance, dec endurance, dec amb, moderate fall risk, dec cognition  Clinical: unpredictable (ongoing medical status, abnormal lab values and moderate fall risk)  Complexity: high     Susan Solorzano, PT

## 2020-07-09 NOTE — PLAN OF CARE
Problem: PHYSICAL THERAPY ADULT  Goal: Performs mobility at highest level of function for planned discharge setting  See evaluation for individualized goals  Description  Treatment/Interventions: Functional transfer training, LE strengthening/ROM, Elevations, Therapeutic exercise, Endurance training, Patient/family training, Bed mobility, Gait training, Spoke to nursing  Equipment Recommended: Adams Nickerson (Comment)([facility] staff may use quick move device for transfers OOB<>chair)       See flowsheet documentation for full assessment, interventions and recommendations  Note:   Prognosis: Fair  Problem List: Decreased strength, Decreased endurance, Impaired balance, Decreased mobility, Decreased cognition, Obesity  Assessment: Pt  74 y  o female presented w/ SOB & worsening BLE edema  Pt admitted for Acute on chronic diastolic heart failure (HCC) w/ acute on chronic respiratory failure w/ hypoxia & hypercapnia, LLE cellulitis, type II MI 2* to pulmonary edema, moderate pulmonary HTN, sepsis & SACHA  Pt referred to PT for mobility assessment & D/C planning w/ orders of up as tolerated  PTA, pt reports that she require assistance w/ overall ADL's & only able to ambulate w/ RW approx  20' at a time  On eval, pt demonstrate dec mobility, balance, endurance & amb  Pt require mod/maxAx1 for bed mobility, transfers & amb w/ RW + cues for techniques  Dec amb tolerance 2* to inc fatigue, SOB & weakness  Gait deviations as above, slow & unsteady w/ dec foot clearance & strides but no gross LOB noted  (+) SOB during activity but relieved w/ rest  SpO2 between 89-94% w/ 4L O2 during activity  No dizziness reported t/o session  Pt incontinent of urine, assisted w/ bed pad & gown change  [facility] staff most recent vital signs as follows: BP 95/53 (BP Location: Left arm)   Pulse 66   Temp 98 °F (36 7 °C) (Temporal)   Resp 18   Ht 5' 1" (1 549 m)   Wt 125 kg (275 lb 5 7 oz)   SpO2 96%   BMI 52 03 kg/m²    At end of session, pt back in bed w/o issues, call bell & phone in reach, bed alarm activated  Fall precautions reinforced w/ good understanding  Pt functioning below baseline hence will continue skilled PT to improve function & safety  At this time, due to above mentioned deficits, inaccessible home & high risk for falls, pt will benefit from inpt rehab at D/C  CM to follow  Nsg staff may utilized quick move device to mobilized pt (OOB in chair for all meals) as tolerated to prevent further decline in function  Nsg notified  Barriers to Discharge: Inaccessible home environment  Barriers to Discharge Comments: (+) stairs  PT Discharge Recommendation: Post-Acute Rehabilitation Services     PT - OK to Discharge: Yes(to inpt rehab when medically cleared)    See flowsheet documentation for full assessment

## 2020-07-09 NOTE — PROGRESS NOTES
Progress Note - Basil Styles 1945, 75 y o  female MRN: [id number]    Unit/Bed#: E4 -01 Encounter: [id number]    Primary Care Provider: No primary care provider on file  Date and time admitted to hospital: 7/4/2020  5:10 PM        Acute on chronic respiratory failure with hypoxia and hypercapnia (HCC)  Assessment & Plan  Multifactorial likely secondary to chronic hypercapnia given elevated bicarb on BMP possibly secondary to up obesity hypoventilation and CHF exacerbation    -patient currently awake alert and oriented, stating she is a DNR DNI however is okay with pressors if needed  -will maintain on BiPAP PRN and monitor  -pulmonary follow-up appreciated  -BIPAP qualification prior to d/c  -continue oxygen supplementation, wean as tolerated    * Acute on chronic diastolic heart failure ([facility] )  Assessment & Plan  Wt Readings from Last 3 Encounters:   07/09/20 125 kg (275 lb 5 7 oz)   05/24/20 115 kg (252 lb 10 4 oz)   04/25/20 111 kg (243 lb 11 2 oz)     Patient presented to ED in respiratory distress  Placed on BiPAP with improvement in work of breathing  Chest x-ray with significant interstitial edema  Weight up 50 lb since discharge in May, monitor daily weights  Echo showing EF of 55% with grade 2 diastolic dysfunction, mild to moderate mitral and tricuspid regurg  Cardiology consultation appreciated- oral diuretics started today  Negative fluid balance of around 2 L  Daily weights and monitor intake and output, fluid restriction, salt restriction    Sepsis ([facility] )  Assessment & Plan  POA criteria met with tachycardia, tachypnea, leukocytosis  Likely secondary to cellulitis  Patient with increased erythema, warmth on left shin  Continue Ancef, day 5  Can switch to oral tomorrow  Also with fungal appearing rash under breast folds-nystatin powder b i d      Atrial fibrillation [facility])  Assessment & Plan  Patient went into AFib with RVR  Initially on amiodarone drip now transition to oral amiodarone 400 mg t i d  For 5 days then 200 mg daily  Continue Eliquis for anticoagulation--will need  support for cost  Cardiology input appreciated    Stage 3 chronic kidney disease ([facility] )  Assessment & Plan  Acute kidney injury on CKD stage 3  Baseline creatinine is 1 4-1 7  Creatinine around baseline  Closely monitor renal function, if progressively increasing will consider nephrology consultation  Monitor BUN/creatinine  Avoid nephrotoxic agents, avoid hypotension    Elevated troponin  Assessment & Plan  Troponin elevated to 0 16 at time of presentation  Likely type 2 non MI troponin elevation secondary to pulmonary edema and demand  Trend to peak, plateaued  Cardiology consult appreciated  EKG without acute ST changes  Monitor on telemetry    VTE Pharmacologic Prophylaxis:   Pharmacologic: Heparin    Patient Centered Rounds: I have performed bedside rounds with nursing staff today    Discussions with Specialists or Other Care Team Provider:     Education and Discussions with Family / Patient:       Current Length of Stay: 5 day(s)    Current Patient Status: Inpatient   Certification Statement: The patient will continue to require additional inpatient hospital stay due to Acute CHF, cellulitis    Discharge Plan:  Probably tomorrow    Code Status: Level 3 - DNAR and DNI      Subjective:   No complaints, no events overnight    Objective:     Vitals:   Temp (24hrs), Av 6 °F (36 4 °C), Min:97 °F (36 1 °C), Max:97 9 °F (36 6 °C)    Temp:  [97 °F (36 1 °C)-97 9 °F (36 6 °C)] 97 7 °F (36 5 °C)  HR:  [62-74] 74  Resp:  [18] 18  BP: (107-130)/(56-66) 108/58  SpO2:  [92 %-96 %] 93 %  Body mass index is 52 03 kg/m²  Input and Output Summary (last 24 hours):        Intake/Output Summary (Last 24 hours) at 2020 1054  Last data filed at 2020 0520  Gross per 24 hour   Intake 240 ml   Output 1750 ml   Net -1510 ml       Physical Exam:     General appearance: alert, appears stated age and cooperative  Head: Normocephalic, without obvious abnormality, atraumatic  Lungs: clear to auscultation bilaterally  Heart: regular rate and rhythm  Abdomen: soft, non-tender, positive bowel sounds   Back: negative  Extremities: Grade 2-3 pedal and pretibial edema  Neurologic: Alert and oriented X 3, normal strength and tone  Normal symmetric reflexes  Normal coordination and gait    Additional Data:     Labs:    Results from last 7 days   Lab Units 07/08/20  0453   WBC Thousand/uL 6 05   HEMOGLOBIN g/dL 9 5*   HEMATOCRIT % 31 0*   PLATELETS Thousands/uL 121*   NEUTROS PCT % 70   LYMPHS PCT % 18   MONOS PCT % 10   EOS PCT % 2     Results from last 7 days   Lab Units 07/09/20  0535  07/04/20  1732   SODIUM mmol/L 143   < > 140   POTASSIUM mmol/L 3 8   < > 3 8   CHLORIDE mmol/L 101   < > 104   CO2 mmol/L 39*   < > 32   BUN mg/dL 22   < > 14   CREATININE mg/dL 1 64*   < > 1 38*   ANION GAP mmol/L 3*   < > 4   CALCIUM mg/dL 8 8   < > 8 8   ALBUMIN g/dL  --   --  2 6*   TOTAL BILIRUBIN mg/dL  --   --  1 63*   ALK PHOS U/L  --   --  166*   ALT U/L  --   --  19   AST U/L  --   --  38   GLUCOSE RANDOM mg/dL 89   < > 140    < > = values in this interval not displayed  Results from last 7 days   Lab Units 07/04/20  1715   INR  1 09             Results from last 7 days   Lab Units 07/04/20  1918 07/04/20  1729   LACTIC ACID mmol/L 1 5 2 5*   PROCALCITONIN ng/ml  --  0 08           * I Have Reviewed All Lab Data Listed Above  * Additional Pertinent Lab Tests Reviewed: [facility]    Imaging:    Imaging Reports Reviewed Today Include: images reviewed    Recent Cultures (last 7 days):     Results from last 7 days   Lab Units 07/04/20  1728 07/04/20  1715   BLOOD CULTURE  No Growth After 4 Days  No Growth After 4 Days         Last 24 Hours Medication List:     Current Facility-Administered Medications:  acetaminophen 650 mg Oral Q4H PRN Dorita Baird PA-C    amiodarone 400 mg Oral TID Low Nation DO apixaban 5 mg Oral BID Rujul Solis, DO    calcium carbonate 1,000 mg Oral TID PRN Raúl Baird PA-C    cefazolin 1,000 mg Intravenous Q8H Dorita Baird, CHRISTOPHER Last Rate: 1,000 mg (07/09/20 0520)   cyanocobalamin 1,000 mcg Oral Daily Dorita Smduncan, CHRISTOPHER    ferrous gluconate 324 mg Oral Daily Before Breakfast Dorita Baird PA-C    folic acid 1 mg Oral Daily Dorita Oli, CHRISTOPHER    guaiFENesin 200 mg Oral Q4H PRN Linda Fields MD    ipratropium-albuterol 3 mL Nebulization Q6H PRN Iram Cunningham MD    metoprolol 5 mg Intravenous Q6H PRN Viveca Cover, DO    metoprolol succinate 25 mg Oral Daily Luis Carlos Mcarthur PA-C    nitroglycerin 0 4 mg Sublingual Q5 Min PRN Dorita Oli, CHRISTOPHER    nystatin  Topical BID Dorita Baird PA-C    ondansetron 4 mg Intravenous Q6H PRN Dorita Baird, CHRISTOPHER    potassium chloride 20 mEq Oral TID With Meals Rujul Solis, DO    senna 1 tablet Oral HS PRN Patricia Ann PA-C         Today, Patient Was Seen By: Giovanni Lopez MD    ** Please Note: Dictation voice to text software may have been used in the creation of this document   **

## 2020-07-09 NOTE — NURSING NOTE
Alerted by PCA to bugs crawling on patient's pillow  Upon assessment, numerous bugs found in patient's hair consistent with head lice  Dr Catherine Rasmussen with [facility] aware  Awaiting orders  Will continue to monitor

## 2020-07-09 NOTE — ASSESSMENT & PLAN NOTE
Acute kidney injury on CKD stage 3  Baseline creatinine is 1 4-1 7  Creatinine around baseline  Closely monitor renal function, if progressively increasing will consider nephrology consultation  Monitor BUN/creatinine  Avoid nephrotoxic agents, avoid hypotension

## 2020-07-09 NOTE — ASSESSMENT & PLAN NOTE
Wt Readings from Last 3 Encounters:   07/09/20 125 kg (275 lb 5 7 oz)   05/24/20 115 kg (252 lb 10 4 oz)   04/25/20 111 kg (243 lb 11 2 oz)     Patient presented to ED in respiratory distress  Placed on BiPAP with improvement in work of breathing  Chest x-ray with significant interstitial edema  Weight up 50 lb since discharge in May, monitor daily weights  Echo showing EF of 55% with grade 2 diastolic dysfunction, mild to moderate mitral and tricuspid regurg  Cardiology consultation appreciated- continue diuresis as per their recommendation  Negative fluid balance of around 2 L  Daily weights and monitor intake and output, fluid restriction, salt restriction

## 2020-07-10 NOTE — ASSESSMENT & PLAN NOTE
Multifactorial likely secondary to chronic hypercapnia given elevated bicarb on BMP possibly secondary to up obesity hypoventilation and CHF exacerbation    -patient currently awake alert and oriented, stating she is a DNR DNI however is okay with pressors if needed  -patient tolerating of BiPAP on 5 L nasal cannula  -pulmonary follow-up appreciated  -BIPAP qualification prior to d/c  -continue oxygen supplementation, wean as tolerated

## 2020-07-10 NOTE — ASSESSMENT & PLAN NOTE
Wt Readings from Last 3 Encounters:   07/10/20 125 kg (275 lb 9 2 oz)   05/24/20 115 kg (252 lb 10 4 oz)   04/25/20 111 kg (243 lb 11 2 oz)     Patient presented to ED in respiratory distress  Placed on BiPAP with improvement in work of breathing  Chest x-ray with significant interstitial edema  Weight up 50 lb since discharge in May, monitor daily weights  Echo showing EF of 55% with grade 2 diastolic dysfunction, mild to moderate mitral and tricuspid regurg  Cardiology consultation appreciated- switched to oral diuretics today as per cardiology  Continue to have negative fluid balance  Daily weights and monitor intake and output, fluid restriction, salt restriction

## 2020-07-10 NOTE — PROGRESS NOTES
Progress Note - Cardiology   Gaby Jordan 75 y o  female MRN: [id number]  Unit/Bed#: E4 -01 Encounter: [id number]        Problem List:  Principal Problem:    Acute on chronic diastolic heart failure ([facility] [address] )  Active Problems:    Acute on chronic respiratory failure with hypoxia and hypercapnia (HCC)    Elevated troponin    Chronic anemia    Stage 3 chronic kidney disease (HCC)    Atrial fibrillation ([facility] [address] )    Sepsis ([facility] [address] )      Asessment:  1  Acute on chronic diastolic heart failure  2  Paroxysmal atrial fibrillation  3  Cellulitis  4  Chronic kidney disease  5  Anemia of chronic disease  6  Obese  7  Pulmonary hypertension secondary to probable sleep apnea and obesity hypoventilation     Plan/ Discussion:  She is off BiPAP and currently on 5 L nasal cannula  She thinks her breathing is probably at about baseline  Her respiratory failure is multifactorial from heart failure, sleep apnea, and obesity hypoventilation syndrome     Contraction alkalosis noted, CO2 was 39 yesterday  Weight is 275, down 27 lbs since admission    · Diamox 250 x 1 today, there is a very small chance of cross reactivity with sulfa allergy and Diamox  I did discuss this in detail with the patient this morning, her allergy to sulfa is that she gets crazy, she is agreeable to try Diamox  · BMP pending today  · Continue oral torsemide for now, 20 BID  · Change to amiodarone 200 mg daily tomorrow  · Eliquis for stroke prevention  · Metoprolol succinate for rate control    Subjective:  Feels okay today  Has no physical complaints  Not sure if her breathing is at baseline or not      Vitals:  Vitals:    07/09/20 0540 07/10/20 0600   Weight: 125 kg (275 lb 5 7 oz) 125 kg (275 lb 9 2 oz)   ,  Vitals:    07/10/20 0300 07/10/20 0331 07/10/20 0600 07/10/20 0805   BP:  106/53  101/53   BP Location:  Left arm  Right arm   Pulse:  62  69   Resp:    18   Temp:  (!) 97 4 °F (36 3 °C)  (!) 97 1 °F (36 2 °C)   TempSrc:  Temporal  Temporal   SpO2: 96% 97%  97%   Weight:   125 kg (275 lb 9 2 oz)    Height:           Exam:  General:  Alert awake and oriented, no acute distress   Limited insight into her medical conditions   Morbidly obese  Heart:  Regular rate and rhythm, no murmurs  Respiratory effort:  Slightly dyspneic on 5 L   Lungs:  Very poor inspiratory effort, difficult to auscultate lung bases    Lower Limbs:  2+ pitting edema bilaterally           Telemetry:           Medications:    Current Facility-Administered Medications:     acetaminophen (TYLENOL) tablet 650 mg, 650 mg, Oral, Q4H PRN, Juanis Knapp PA-C    amiodarone tablet 400 mg, 400 mg, Oral, TID, [place] Poncho, DO, 400 mg at 07/09/20 2117    apixaban (ELIQUIS) tablet 5 mg, 5 mg, Oral, BID, Rujul Oslis, DO, 5 mg at 07/09/20 1741    calcium carbonate (TUMS) chewable tablet 1,000 mg, 1,000 mg, Oral, TID PRN, Vinh Baird PA-C    ceFAZolin (ANCEF) IVPB (premix) 1,000 mg 50 mL, 1,000 mg, Intravenous, Q8H, Dorita Baird PA-C, Last Rate: 100 mL/hr at 07/10/20 0545, 1,000 mg at 07/10/20 0545    cyanocobalamin (VITAMIN B-12) tablet 1,000 mcg, 1,000 mcg, Oral, Daily, Dorita Baird PA-C, 1,000 mcg at 07/09/20 0856    ferrous gluconate (FERGON) tablet 324 mg, 324 mg, Oral, Daily Before Breakfast, Dorita Baird PA-C, 324 mg at 98/73/66 1756    folic acid (FOLVITE) tablet 1 mg, 1 mg, Oral, Daily, Dorita Baird PA-C, 1 mg at 07/09/20 0856    guaiFENesin (ROBITUSSIN) oral solution 200 mg, 200 mg, Oral, Q4H PRN, Yury Blankenship MD, 200 mg at 07/08/20 1143    ipratropium-albuterol (DUO-NEB) 0 5-2 5 mg/3 mL inhalation solution 3 mL, 3 mL, Nebulization, Q6H PRN, Angely Cornelius MD    metoprolol (LOPRESSOR) injection 5 mg, 5 mg, Intravenous, Q6H PRN, Jan Pina DO    metoprolol succinate (TOPROL-XL) 24 hr tablet 25 mg, 25 mg, Oral, Daily, Radha Mcarthur PA-C, 25 mg at 07/09/20 0856    nitroglycerin (NITROSTAT) SL tablet 0 4 mg, 0 4 mg, Sublingual, Q5 Min PRN, Juanis Knapp PA-C   nystatin (MYCOSTATIN) powder, , Topical, BID, Dorita Oli PA-C    ondansetron (ZOFRAN) injection 4 mg, 4 mg, Intravenous, Q6H PRN, Micky Baird PA-C    potassium chloride (K-DUR,KLOR-CON) CR tablet 20 mEq, 20 mEq, Oral, TID With Meals, Rujul Solis, DO, 20 mEq at 07/09/20 1741    senna (SENOKOT) tablet 8 6 mg, 1 tablet, Oral, HS PRN, Micky Baird PA-C    torsemide (DEMADEX) tablet 20 mg, 20 mg, Oral, BID (diuretic), LETA Vale-C, 20 mg at 07/09/20 1742      Labs/Data:        Results from last 7 days   Lab Units 07/08/20  0453 07/07/20  0657 07/06/20  0534   WBC Thousand/uL 6 05 6 48 6 07   HEMOGLOBIN g/dL 9 5* 9 7* 9 3*   HEMATOCRIT % 31 0* 31 3* 30 7*   PLATELETS Thousands/uL 121* 126* 128*     Results from last 7 days   Lab Units 07/09/20  0535 07/08/20  0453 07/07/20  0657  07/05/20  1145 07/05/20  0043 07/04/20  2049   POTASSIUM mmol/L 3 8 3 9 4 3   < > 4 3  --   --    CHLORIDE mmol/L 101 101 102   < > 103  --   --    CO2 mmol/L 39* 33* 32   < > 33*  --   --    BUN mg/dL 22 22 19   < > 14  --   --    TROPONIN I ng/mL  --   --   --   --  0 16* 0 26* 0 14*    < > = values in this interval not displayed

## 2020-07-10 NOTE — PLAN OF CARE
Problem: Potential for Falls  Goal: Patient will remain free of falls  Description  INTERVENTIONS:  - Assess patient frequently for physical needs  -  Identify cognitive and physical deficits and behaviors that affect risk of falls    -  [place] fall precautions as indicated by assessment   - Educate patient/family on patient safety including physical limitations  - Instruct patient to call for assistance with activity based on assessment  - Modify environment to reduce risk of injury  - Consider OT/PT consult to assist with strengthening/mobility  Outcome: Progressing     Problem: Prexisting or High Potential for Compromised Skin Integrity  Goal: Skin integrity is maintained or improved  Description  INTERVENTIONS:  - Identify patients at risk for skin breakdown  - Assess and monitor skin integrity  - Assess and monitor nutrition and hydration status  - Monitor labs   - Assess for incontinence   - Turn and reposition patient  - Assist with mobility/ambulation  - Relieve pressure over bony prominences  - Avoid friction and shearing  - Provide appropriate hygiene as needed including keeping skin clean and dry  - Evaluate need for skin moisturizer/barrier cream  - Collaborate with interdisciplinary team   - Patient/family teaching  - Consider wound care consult   Outcome: Progressing     Problem: CARDIOVASCULAR - ADULT  Goal: Maintains optimal cardiac output and hemodynamic stability  Description  INTERVENTIONS:  - Monitor I/O, vital signs and rhythm  - Monitor for S/S and trends of decreased cardiac output  - Administer and titrate ordered vasoactive medications to optimize hemodynamic stability  - Assess quality of pulses, skin color and temperature  - Assess for signs of decreased coronary artery perfusion  - Instruct patient to report change in severity of symptoms  Outcome: Progressing  Goal: Absence of cardiac dysrhythmias or at baseline rhythm  Description  INTERVENTIONS:  - Continuous cardiac monitoring, vital signs, obtain 12 lead EKG if ordered  - Administer antiarrhythmic and heart rate control medications as ordered  - Monitor electrolytes and administer replacement therapy as ordered  Outcome: Progressing     Problem: RESPIRATORY - ADULT  Goal: Achieves optimal ventilation and oxygenation  Description  INTERVENTIONS:  - Assess for changes in respiratory status  - Assess for changes in mentation and behavior  - Position to facilitate oxygenation and minimize respiratory effort  - Oxygen administered by appropriate delivery if ordered  - Initiate smoking cessation education as indicated  - Encourage broncho-pulmonary hygiene including cough, deep breathe, Incentive Spirometry  - Assess the need for suctioning and aspirate as needed  - Assess and instruct to report SOB or any respiratory difficulty  - Respiratory Therapy support as indicated  Outcome: Progressing     Problem: METABOLIC, FLUID AND ELECTROLYTES - ADULT  Goal: Fluid balance maintained  Description  INTERVENTIONS:  - Monitor labs   - Monitor I/O and WT  - Instruct patient on fluid and nutrition as appropriate  - Assess for signs & symptoms of volume excess or deficit  Outcome: Progressing     Problem: SKIN/TISSUE INTEGRITY - ADULT  Goal: Skin integrity remains intact  Description  INTERVENTIONS  - Identify patients at risk for skin breakdown  - Assess and monitor skin integrity  - Assess and monitor nutrition and hydration status  - Monitor labs (i e  albumin)  - Assess for incontinence   - Turn and reposition patient  - Assist with mobility/ambulation  - Relieve pressure over bony prominences  - Avoid friction and shearing  - Provide appropriate hygiene as needed including keeping skin clean and dry  - Evaluate need for skin moisturizer/barrier cream  - Collaborate with interdisciplinary team (i e  Nutrition, Rehabilitation, etc )   - Patient/family teaching  Outcome: Progressing     Problem: MUSCULOSKELETAL - ADULT  Goal: Maintain or return mobility to safest level of function  Description  INTERVENTIONS:  - Assess patient's ability to carry out ADLs; assess patient's baseline for ADL function and identify physical deficits which impact ability to perform ADLs (bathing, care of mouth/teeth, toileting, grooming, dressing, etc )  - Assess/evaluate cause of self-care deficits   - Assess range of motion  - Assess patient's mobility  - Assess patient's need for assistive devices and provide as appropriate  - Encourage maximum independence but intervene and supervise when necessary  - Involve family in performance of ADLs  - Assess for home care needs following discharge   - Consider OT consult to assist with ADL evaluation and planning for discharge  - Provide patient education as appropriate  Outcome: Progressing

## 2020-07-10 NOTE — ASSESSMENT & PLAN NOTE
POA criteria met with tachycardia, tachypnea, leukocytosis  Likely secondary to cellulitis  Patient with increased erythema, warmth on left shin  Continue Ancef, day 5  Switched to oral Keflex to finish a total antibiotic course of 10 days  Also with fungal appearing rash under breast folds-nystatin powder b i d

## 2020-07-10 NOTE — PROGRESS NOTES
Progress Note - Kris Ritchie 1945, 75 y o  female MRN: [id number]    Unit/Bed#: E4 -01 Encounter: [id number]    Primary Care Provider: No primary care provider on file  Date and time admitted to hospital: 7/4/2020  5:10 PM    Acute on chronic respiratory failure with hypoxia and hypercapnia (HCC)  Assessment & Plan  Multifactorial likely secondary to chronic hypercapnia given elevated bicarb on BMP possibly secondary to up obesity hypoventilation and CHF exacerbation    -patient currently awake alert and oriented, stating she is a DNR DNI however is okay with pressors if needed  -patient tolerating of BiPAP on 5 L nasal cannula  -pulmonary follow-up appreciated  -BIPAP qualification prior to d/c  -continue oxygen supplementation, wean as tolerated    * Acute on chronic diastolic heart failure (HCC)  Assessment & Plan  Wt Readings from Last 3 Encounters:   07/10/20 125 kg (275 lb 9 2 oz)   05/24/20 115 kg (252 lb 10 4 oz)   04/25/20 111 kg (243 lb 11 2 oz)     Patient presented to ED in respiratory distress  Placed on BiPAP with improvement in work of breathing  Chest x-ray with significant interstitial edema  Weight up 50 lb since discharge in May, monitor daily weights  Echo showing EF of 55% with grade 2 diastolic dysfunction, mild to moderate mitral and tricuspid regurg  Cardiology consultation appreciated- switched to oral diuretics today as per cardiology  Continue to have negative fluid balance  Daily weights and monitor intake and output, fluid restriction, salt restriction    Sepsis ([facility] [address] )  Assessment & Plan  POA criteria met with tachycardia, tachypnea, leukocytosis  Likely secondary to cellulitis  Patient with increased erythema, warmth on left shin  Continue Ancef, day 5  Switched to oral Keflex to finish a total antibiotic course of 10 days  Also with fungal appearing rash under breast folds-nystatin powder b i d      Atrial fibrillation [facility])  Assessment & Plan  Patient went into AFib with RVR  Initially on amiodarone drip now transition to oral amiodarone 400 mg t i d  For 5 days then 200 mg daily  Continue Eliquis for anticoagulation--will need cm support for cost  Cardiology input appreciated    Stage 3 chronic kidney disease ([facility] [address] )  Assessment & Plan  Acute kidney injury on CKD stage 3  Baseline creatinine is 1 4-1 7  Creatinine around baseline  Closely monitor renal function, if progressively increasing will consider nephrology consultation  Monitor BUN/creatinine  Avoid nephrotoxic agents, avoid hypotension    Chronic anemia  Assessment & Plan  Hemoglobin stable at 9 3, baseline 8-9  Continue folate, iron and vitamin B12 supplementation    Elevated troponin  Assessment & Plan  Troponin elevated to 0 16 at time of presentation  Likely type 2 non MI troponin elevation secondary to pulmonary edema and demand  Trend to peak, plateaued  Cardiology consult appreciated  EKG without acute ST changes  Monitor on telemetry    VTE Pharmacologic Prophylaxis:   Pharmacologic: Apixaban (Eliquis)    Patient Centered Rounds: I have performed bedside rounds with nursing staff today    Discussions with Specialists or Other Care Team Provider:     Education and Discussions with Family / Patient:       Current Length of Stay: 6 day(s)    Current Patient Status: Inpatient   Certification Statement: The patient will continue to require additional inpatient hospital stay due to Acute CHF    Discharge Plan:  Probably in 2-3 days, awaiting placement    Code Status: Level 3 - DNAR and DNI      Subjective:   No complaints  No events overnight  Objective:     Vitals:   Temp (24hrs), Av 8 °F (36 6 °C), Min:97 1 °F (36 2 °C), Max:98 6 °F (37 °C)    Temp:  [97 1 °F (36 2 °C)-98 6 °F (37 °C)] 97 8 °F (36 6 °C)  HR:  [62-76] 62  Resp:  [18-24] 18  BP: ()/(51-67) 105/51  SpO2:  [94 %-97 %] 95 %  Body mass index is 52 07 kg/m²  Input and Output Summary (last 24 hours):        Intake/Output Summary (Last 24 hours) at 7/10/2020 1237  Last data filed at 7/10/2020 1054  Gross per 24 hour   Intake    Output 670 ml   Net -670 ml       Physical Exam:     General appearance: alert, appears stated age and cooperative  Head: Normocephalic, without obvious abnormality, atraumatic  Lungs: clear to auscultation bilaterally  Heart: regular rate and rhythm  Abdomen: soft, non-tender, positive bowel sounds   Back: negative  Extremities: extremities atraumatic, no cyanosis or edema  Neurologic: Grossly normal    Additional Data:     Labs:    Results from last 7 days   Lab Units 07/08/20  0453   WBC Thousand/uL 6 05   HEMOGLOBIN g/dL 9 5*   HEMATOCRIT % 31 0*   PLATELETS Thousands/uL 121*   NEUTROS PCT % 70   LYMPHS PCT % 18   MONOS PCT % 10   EOS PCT % 2     Results from last 7 days   Lab Units 07/09/20  0535  07/04/20  1732   SODIUM mmol/L 143   < > 140   POTASSIUM mmol/L 3 8   < > 3 8   CHLORIDE mmol/L 101   < > 104   CO2 mmol/L 39*   < > 32   BUN mg/dL 22   < > 14   CREATININE mg/dL 1 64*   < > 1 38*   ANION GAP mmol/L 3*   < > 4   CALCIUM mg/dL 8 8   < > 8 8   ALBUMIN g/dL  --   --  2 6*   TOTAL BILIRUBIN mg/dL  --   --  1 63*   ALK PHOS U/L  --   --  166*   ALT U/L  --   --  19   AST U/L  --   --  38   GLUCOSE RANDOM mg/dL 89   < > 140    < > = values in this interval not displayed  Results from last 7 days   Lab Units 07/04/20  1715   INR  1 09             Results from last 7 days   Lab Units 07/04/20  1918 07/04/20  1729   LACTIC ACID mmol/L 1 5 2 5*   PROCALCITONIN ng/ml  --  0 08           * I Have Reviewed All Lab Data Listed Above  * Additional Pertinent Lab Tests Reviewed: [facility] Admission Reviewed    Imaging:    Imaging Reports Reviewed Today Include: images reviewed    Recent Cultures (last 7 days):     Results from last 7 days   Lab Units 07/04/20  1728 07/04/20  1715   BLOOD CULTURE  No Growth After 5 Days  No Growth After 5 Days         Last 24 Hours Medication List:     Current Facility-Administered Medications:  acetaminophen 650 mg Oral Q4H PRN Dietyola CalioCHRISTOPHER    amiodarone 400 mg Oral TID Rosalba Peters,     apixaban 5 mg Oral BID Rujul Solis, DO    calcium carbonate 1,000 mg Oral TID PRN Micky Baird PA-C    cefazolin 1,000 mg Intravenous Q8H Dorita Baird, PA-KOSTA Last Rate: 1,000 mg (07/10/20 0545)   cyanocobalamin 1,000 mcg Oral Daily Dorita Smudde, CHRISTOPHER    ferrous gluconate 324 mg Oral Daily Before Breakfast Dorita Baird PA-C    folic acid 1 mg Oral Daily Dorita SmuddeCHRISTOPHER    guaiFENesin 200 mg Oral Q4H PRN Brenda Elliott MD    ipratropium-albuterol 3 mL Nebulization Q6H PRN Elly Navas MD    metoprolol 5 mg Intravenous Q6H PRN Rosalba Peters,     metoprolol succinate 25 mg Oral Daily Luis Carlos Mcarthur PA-C    nitroglycerin 0 4 mg Sublingual Q5 Min PRN Micky Baird PA-C    nystatin  Topical BID Dorita Baird PA-C    ondansetron 4 mg Intravenous Q6H PRN Dorita Baird PA-C    potassium chloride 20 mEq Oral TID With Meals Shalom Solis, DO    senna 1 tablet Oral HS PRN Dorita Baird, CHRISTOPHER    torsemide 20 mg Oral BID (diuretic) [employer], PA-C         Today, Patient Was Seen By: Esteban Oewn MD    ** Please Note: Dictation voice to text software may have been used in the creation of this document   **

## 2020-07-11 PROBLEM — M79.89 LEFT ARM SWELLING: Status: ACTIVE | Noted: 2020-01-01

## 2020-07-11 NOTE — ASSESSMENT & PLAN NOTE
Patient went into AFib with RVR  I continue amiodarone 200 mg daily  Continue Eliquis for anticoagulation  Cardiology input appreciated

## 2020-07-11 NOTE — OCCUPATIONAL THERAPY NOTE
Occupational Therapy Evaluation(time=1030-1100)     Patient Name: Jerilynn Kussmaul  Today's Date: 7/11/2020  Problem List  Principal Problem:    Acute on chronic diastolic heart failure ([facility] )  Active Problems:    Acute on chronic respiratory failure with hypoxia and hypercapnia (HCC)    Elevated troponin    Chronic anemia    Stage 3 chronic kidney disease (HCC)    Atrial fibrillation ([facility] )    Sepsis ([facility] )    Past Medical History  Past Medical History:   Diagnosis Date    Atrial fibrillation (HCC)     Chronic anemia     CKD (chronic kidney disease)     Diastolic CHF ([facility] )      Past Surgical History  Past Surgical History:   Procedure Laterality Date    APPENDECTOMY      HYSTERECTOMY      TONSILLECTOMY               07/11/20 1100   Note Type   Note type Eval only   Restrictions/Precautions   Weight Bearing Precautions Per Order No   Other Precautions Contact/isolation;Cognitive; Bed Alarm;Multiple lines;O2;Fall Risk   Pain Assessment   Pain Assessment Tool Pain Assessment not indicated - pt denies pain   Pain Score No Pain   Home Living   Type of Home House   Home Layout Two level; Able to live on main level with bedroom/bathroom   Bathroom Shower/Tub Tub/shower unit   Bathroom Toilet Standard   Bathroom Equipment Commode   Home Equipment Walker;Cane   Prior Function   Lives With Daughter;Family  (grandson)   ADL Assistance Needs assistance   Falls in the last 6 months 0   Lifestyle   Autonomy PTA pt states that she had assistance with her ADLs, transfers, ambulation--with RW, limited distances; neg falls, neg home alone   Reciprocal Relationships 2 children   Service to Others worked as a    Intrinsic Gratification watching TV, reading   Alok Zuleta 19 (WDL) X   Patient Behaviors/Mood Flat affect; Cooperative   Subjective   Subjective "I don't know how long I have been here "   ADL   Where Assessed Edge of bed   Eating Assistance 5  Supervision/Setup   Grooming Assistance 4  Minimal Assistance   UB Bathing Assistance 3  Moderate Assistance   LB Bathing Assistance 2  Maximal Assistance   UB Dressing Assistance 3  Moderate Assistance   LB Dressing Assistance 2  Maximal Assistance   Bed Mobility   Rolling R 3  Moderate assistance   Additional items Assist x 1; Increased time required;Verbal cues;LE management   Rolling L 3  Moderate assistance   Additional items Assist x 1; Increased time required;Verbal cues;LE management   Supine to Sit 2  Maximal assistance   Additional items Assist x 1; Increased time required;Verbal cues;LE management   Sit to Supine 2  Maximal assistance   Additional items Assist x 1; Increased time required;Verbal cues;LE management   Transfers   Sit to Stand 3  Moderate assistance   Additional items Assist x 1; Increased time required;Verbal cues   Stand to Sit 3  Moderate assistance   Additional items Assist x 1; Increased time required;Verbal cues   Functional Mobility   Functional Mobility 2  Maximal assistance   Additional Comments x1   Additional items Rolling walker   Balance   Static Sitting Fair +   Dynamic Sitting Fair   Static Standing Poor +   Dynamic Standing Poor -   Activity Tolerance   Activity Tolerance Patient limited by fatigue   Medical Staff Made Aware nsg    RUE Assessment   RUE Assessment WFL  (limited b/l shr AROM(i e flex=80-90*))   RUE Strength   RUE Overall Strength Within Functional Limits - able to perform ADL tasks with strength  (3+/5 throughout)   LUE Assessment   LUE Assessment X  (limited shr AROM(i e flex=70-80*))   LUE Strength   LUE Overall Strength   (shr=3-/5, elbow-distal=3+/5)   Hand Function   Gross Motor Coordination Functional   Fine Motor Coordination Functional   Sensation   Light Touch No apparent deficits   Proprioception   Proprioception No apparent deficits   Vision-Basic Assessment   Current Vision   (? glasses--pt can't remember)   Vision - Complex Assessment   Acuity   (impaired)   Perception   Inattention/Neglect Appears intact   Cognition   Overall Cognitive Status Impaired   Arousal/Participation Alert   Attention Attends with cues to redirect   Orientation Level Oriented to person;Oriented to place   Memory Decreased short term memory;Decreased recall of recent events;Decreased recall of precautions   Following Commands Follows one step commands with increased time or repetition   Comments pt states hx memory loss   Assessment   Limitation Decreased ADL status; Decreased UE ROM; Decreased UE strength;Decreased Safe judgement during ADL;Decreased cognition;Decreased endurance;Decreased high-level ADLs; Visual deficit; Decreased fine motor control   Prognosis Fair   Assessment Pt is a 73y/o female admitted to the hospital 2* symptoms of SOB, LE edema, abdominal swelling  Pt noted with acute/CHF, sepsis  Pt with PMH home O2, anemia, CKD, CHF, A-fib, and recent hospitalization 2* A-fib, RVR  PTA pt states that she had assistance with her ADLs, transfers, ambulation--with RW, limited distances; neg falls, neg home alone  During initial eval, pt demonstrated deficits with her functional balance, functional mobility, ADL status, transfer safety, activity tolerance(currently fair=15-20mins), b/l UE strength, and cognition(i e orientation, problem-solving, judgement/safety)  Pt would benefit from continued OT tx for the above deficits  3-5xwk/1-2wks  Goals   Patient Goals "be able to breathe better"   STG Time Frame   (1-7 days)   Short Term Goal #1 Pt will demonstrate improved activity tolerance to good(20-30mins) and standing tolerance to 3-5mins to assist with ADLs  Short Term Goal #2 Pt will demonstrate Carl with their bed mobility and sit-stand transfers to assist with ADLs  Short Term Goal  Pt will tolerate continued cognitive/home-safety assessment and appropriate d/c recommendations will be provided  LTG Time Frame   (7-14days)   Long Term Goal #1 Pt will demonstrate proper walker/transfer safety 100% of the time      Long Term Goal #2 Pt will demonstrate improved functional balance by 1 grade to assist with ADLs/transfers  Long Term Goal Pt will demonstrate improved b/l UE strength by 1/2 MM grade to assist with ADLs/transfers  Plan   Treatment Interventions ADL retraining;Functional transfer training;UE strengthening/ROM; Endurance training;Cognitive reorientation;Patient/family training;Equipment evaluation/education; Compensatory technique education;Continued evaluation   Goal Expiration Date 07/25/20   OT Treatment Day 0   OT Frequency 3-5x/wk   Recommendation   OT Discharge Recommendation Post-Acute Rehabilitation Services   Barthel Index   Feeding 5   Bathing 0   Grooming Score 0   Dressing Score 0   Bladder Score 0   Bowels Score 5   Toilet Use Score 0   Transfers (Bed/Chair) Score 0   Mobility (Level Surface) Score 0   Stairs Score 0   Barthel Index Score 10   Roney Dillard, OT

## 2020-07-11 NOTE — ASSESSMENT & PLAN NOTE
Left arm swelling and erythema  Rule out thrombophlebitis, venous Doppler pending  Can manage with ice pack

## 2020-07-11 NOTE — PROGRESS NOTES
Progress Note - Lorena Yeboah 1945, 75 y o  female MRN: [id number]    Unit/Bed#: E4 -01 Encounter: [id number]    Primary Care Provider: No primary care provider on file  Date and time admitted to hospital: 7/4/2020  5:10 PM    Acute on chronic respiratory failure with hypoxia and hypercapnia (HCC)  Assessment & Plan  Multifactorial likely secondary to chronic hypercapnia given elevated bicarb on BMP possibly secondary to up obesity hypoventilation and CHF exacerbation    -patient currently awake alert and oriented, stating she is a DNR DNI however is okay with pressors if needed  -patient tolerating of BiPAP on 5 L nasal cannula  -pulmonary follow-up appreciated  -BIPAP qualification prior to d/c  -continue oxygen supplementation, wean as tolerated    * Acute on chronic diastolic heart failure (HCC)  Assessment & Plan  Wt Readings from Last 3 Encounters:   07/11/20 124 kg (272 lb 4 3 oz)   05/24/20 115 kg (252 lb 10 4 oz)   04/25/20 111 kg (243 lb 11 2 oz)     Patient presented to ED in respiratory distress  Placed on BiPAP with improvement in work of breathing  Chest x-ray with significant interstitial edema  Weight up 50 lb since discharge in May, monitor daily weights  Echo showing EF of 55% with grade 2 diastolic dysfunction, mild to moderate mitral and tricuspid regurg  Cardiology consultation appreciated  Noticed to have contracture alkalosis  Hold p m  Torsemide dose, give 1 dose of 250 mg Diamox  Recheck bicarb tomorrow  Daily weights and monitor intake and output, fluid restriction, salt restriction    Sepsis ([facility] )  Assessment & Plan  POA criteria met with tachycardia, tachypnea, leukocytosis  Likely secondary to cellulitis  Patient with increased erythema, warmth on left shin  Continue Ancef, day 5  Switched to oral Keflex to finish a total antibiotic course of 10 days  Also with fungal appearing rash under breast folds-nystatin powder b i d      Left arm swelling  Assessment & Plan  Left arm swelling and erythema  Rule out thrombophlebitis, venous Doppler pending  Can manage with ice pack    Atrial fibrillation [facility])  Assessment & Plan  Patient went into AFib with RVR  I continue amiodarone 200 mg daily  Continue Eliquis for anticoagulation  Cardiology input appreciated    Stage 3 chronic kidney disease ([facility] )  Assessment & Plan  Acute kidney injury on CKD stage 3  Baseline creatinine is 1 4-1 7  Creatinine around baseline  Closely monitor renal function, if progressively increasing will consider nephrology consultation  Monitor BUN/creatinine  Avoid nephrotoxic agents, avoid hypotension    Elevated troponin  Assessment & Plan  Troponin elevated to 0 16 at time of presentation  Likely type 2 non MI troponin elevation secondary to pulmonary edema and demand  Trend to peak, plateaued  Cardiology consult appreciated  EKG without acute ST changes  Monitor on telemetry    VTE Pharmacologic Prophylaxis:   Pharmacologic: Apixaban (Eliquis)    Patient Centered Rounds: I have performed bedside rounds with nursing staff today    Discussions with Specialists or Other Care Team Provider:     Education and Discussions with Family / Patient:     Current Length of Stay: 7 day(s)    Current Patient Status: Inpatient   Certification Statement: The patient will continue to require additional inpatient hospital stay due to Acute CHF, metabolic alkalosis    Discharge Plan:  Probably in 2-3 days    Code Status: Level 3 - DNAR and DNI      Subjective:  No complaints    Objective:     Vitals:   Temp (24hrs), Av °F (36 1 °C), Min:96 3 °F (35 7 °C), Max:97 7 °F (36 5 °C)    Temp:  [96 3 °F (35 7 °C)-97 7 °F (36 5 °C)] 96 6 °F (35 9 °C)  HR:  [51-88] 51  Resp:  [18-20] 18  BP: ()/(50-60) 122/60  SpO2:  [94 %-98 %] 98 %  Body mass index is 51 44 kg/m²  Input and Output Summary (last 24 hours):        Intake/Output Summary (Last 24 hours) at 2020 1311  Last data filed at 2020 0701  Gross per 24 hour   Intake 240 ml   Output 1190 ml   Net -950 ml       Physical Exam:     General appearance: alert, appears stated age and cooperative  Head: Normocephalic, without obvious abnormality, atraumatic  Lungs: clear to auscultation bilaterally  Heart: regular rate and rhythm  Abdomen: soft, non-tender, positive bowel sounds   Back: negative  Extremities: There is erythema and mild swelling over the left antecubital area, no fluctuance  Neurologic: Grossly normal    Additional Data:     Labs:    Results from last 7 days   Lab Units 07/08/20  0453   WBC Thousand/uL 6 05   HEMOGLOBIN g/dL 9 5*   HEMATOCRIT % 31 0*   PLATELETS Thousands/uL 121*   NEUTROS PCT % 70   LYMPHS PCT % 18   MONOS PCT % 10   EOS PCT % 2     Results from last 7 days   Lab Units 07/11/20  0925  07/04/20  1732   SODIUM mmol/L 143   < > 140   POTASSIUM mmol/L 4 2   < > 3 8   CHLORIDE mmol/L 103   < > 104   CO2 mmol/L 41*   < > 32   BUN mg/dL 25   < > 14   CREATININE mg/dL 1 76*   < > 1 38*   ANION GAP mmol/L -1*   < > 4   CALCIUM mg/dL 8 8   < > 8 8   ALBUMIN g/dL  --   --  2 6*   TOTAL BILIRUBIN mg/dL  --   --  1 63*   ALK PHOS U/L  --   --  166*   ALT U/L  --   --  19   AST U/L  --   --  38   GLUCOSE RANDOM mg/dL 86   < > 140    < > = values in this interval not displayed  Results from last 7 days   Lab Units 07/04/20  1715   INR  1 09             Results from last 7 days   Lab Units 07/04/20  1918 07/04/20  1729   LACTIC ACID mmol/L 1 5 2 5*   PROCALCITONIN ng/ml  --  0 08           * I Have Reviewed All Lab Data Listed Above  * Additional Pertinent Lab Tests Reviewed: Amina 66 Admission Reviewed    Imaging:    Imaging Reports Reviewed Today Include: images reviewed    Recent Cultures (last 7 days):     Results from last 7 days   Lab Units 07/04/20  1728 07/04/20  1715   BLOOD CULTURE  No Growth After 5 Days  No Growth After 5 Days         Last 24 Hours Medication List:     Current Facility-Administered Medications:  acetaminophen 650 mg Oral Q4H PRN Ab Dougherty PA-C   acetaZOLAMIDE 500 mg Oral Once Christ Romberg, MD   [START ON 7/12/2020] amiodarone 200 mg Oral Daily With Breakfast Amanda Best MD   apixaban 5 mg Oral BID Rujul Solis, DO   calcium carbonate 1,000 mg Oral TID PRN Ab Dougherty PA-C   cephalexin 250 mg Oral Q8H [facility] Christ Romberg, MD   cyanocobalamin 1,000 mcg Oral Daily Dorita Baird PA-C   ferrous gluconate 324 mg Oral Daily Before Breakfast Dorita Baird PA-C   folic acid 1 mg Oral Daily Dorita Baird PA-C   guaiFENesin 200 mg Oral Q4H PRN Michaela Beasley MD   ipratropium-albuterol 3 mL Nebulization Q6H PRN Ami Rios MD   metoprolol 5 mg Intravenous Q6H PRN Kendall Spencer,    metoprolol succinate 25 mg Oral Daily Luis Carlos Mcarthur PA-C   nitroglycerin 0 4 mg Sublingual Q5 Min PRN Miesha Baird PA-C   nystatin  Topical BID Dorita Baird PA-C   ondansetron 4 mg Intravenous Q6H PRN Dorita Baird PA-C   potassium chloride 20 mEq Oral TID With Meals Rujul Solis, DO   senna 1 tablet Oral HS PRN Ab Dougherty PA-C   [START ON 7/12/2020] torsemide 20 mg Oral BID (diuretic) Christ Romberg, MD        Today, Patient Was Seen By: Christ Romberg, MD    ** Please Note: Dictation voice to text software may have been used in the creation of this document   **

## 2020-07-11 NOTE — ASSESSMENT & PLAN NOTE
Wt Readings from Last 3 Encounters:   07/11/20 124 kg (272 lb 4 3 oz)   05/24/20 115 kg (252 lb 10 4 oz)   04/25/20 111 kg (243 lb 11 2 oz)     Patient presented to ED in respiratory distress  Placed on BiPAP with improvement in work of breathing  Chest x-ray with significant interstitial edema  Weight up 50 lb since discharge in May, monitor daily weights  Echo showing EF of 55% with grade 2 diastolic dysfunction, mild to moderate mitral and tricuspid regurg  Cardiology consultation appreciated  Noticed to have contracture alkalosis  Hold p m   Torsemide dose, give 1 dose of 250 mg Diamox  Recheck bicarb tomorrow  Daily weights and monitor intake and output, fluid restriction, salt restriction

## 2020-07-11 NOTE — PLAN OF CARE
Problem: OCCUPATIONAL THERAPY ADULT  Goal: Performs self-care activities at highest level of function for planned discharge setting  See evaluation for individualized goals  Description  Treatment Interventions: ADL retraining, Functional transfer training, UE strengthening/ROM, Endurance training, Cognitive reorientation, Patient/family training, Equipment evaluation/education, Compensatory technique education, Continued evaluation          See flowsheet documentation for full assessment, interventions and recommendations  Note:   Limitation: Decreased ADL status, Decreased UE ROM, Decreased UE strength, Decreased Safe judgement during ADL, Decreased cognition, Decreased endurance, Decreased high-level ADLs, Visual deficit, Decreased fine motor control  Prognosis: Fair  Assessment: Pt is a 75y/o female admitted to the hospital 2* symptoms of SOB, LE edema, abdominal swelling  Pt noted with acute/CHF, sepsis  Pt with PMH home O2, anemia, CKD, CHF, A-fib, and recent hospitalization 2* A-fib, RVR  PTA pt states that she had assistance with her ADLs, transfers, ambulation--with RW, limited distances; neg falls, neg home alone  During initial eval, pt demonstrated deficits with her functional balance, functional mobility, ADL status, transfer safety, activity tolerance(currently fair=15-20mins), b/l UE strength, and cognition(i e orientation, problem-solving, judgement/safety)  Pt would benefit from continued OT tx for the above deficits  3-5xwk/1-2wks        OT Discharge Recommendation: Post-Acute Rehabilitation Services

## 2020-07-12 NOTE — ASSESSMENT & PLAN NOTE
POA criteria met with tachycardia, tachypnea, leukocytosis  Likely secondary to cellulitis  Much improved now  Continue oral Keflex for total of 10 days  Also with fungal appearing rash under breast folds-nystatin powder b i d

## 2020-07-12 NOTE — ASSESSMENT & PLAN NOTE
Wt Readings from Last 3 Encounters:   07/12/20 124 kg (272 lb 7 8 oz)   05/24/20 115 kg (252 lb 10 4 oz)   04/25/20 111 kg (243 lb 11 2 oz)     Patient presented to ED in respiratory distress  Placed on BiPAP with improvement in work of breathing  Chest x-ray with significant interstitial edema  Weight up 50 lb since discharge in May, monitor daily weights  Echo showing EF of 55% with grade 2 diastolic dysfunction, mild to moderate mitral and tricuspid regurg  Cardiology consultation appreciated  Noticed to have contracture alkalosis  Hold torsemide and give Diamox  Recheck bicarb tomorrow  Daily weights and monitor intake and output, fluid restriction, salt restriction

## 2020-07-12 NOTE — ASSESSMENT & PLAN NOTE
Troponin elevated to 0 16 at time of presentation  Likely type 2 non MI troponin elevation secondary to pulmonary edema and demand  Cardiology consult appreciated  EKG without acute ST changes  Monitor on telemetry

## 2020-07-12 NOTE — PLAN OF CARE
Problem: Potential for Falls  Goal: Patient will remain free of falls  Description  INTERVENTIONS:  - Assess patient frequently for physical needs  -  Identify cognitive and physical deficits and behaviors that affect risk of falls    -  [place] fall precautions as indicated by assessment   - Educate patient/family on patient safety including physical limitations  - Instruct patient to call for assistance with activity based on assessment  - Modify environment to reduce risk of injury  - Consider OT/PT consult to assist with strengthening/mobility  Outcome: Progressing     Problem: Prexisting or High Potential for Compromised Skin Integrity  Goal: Skin integrity is maintained or improved  Description  INTERVENTIONS:  - Identify patients at risk for skin breakdown  - Assess and monitor skin integrity  - Assess and monitor nutrition and hydration status  - Monitor labs   - Assess for incontinence   - Turn and reposition patient  - Assist with mobility/ambulation  - Relieve pressure over bony prominences  - Avoid friction and shearing  - Provide appropriate hygiene as needed including keeping skin clean and dry  - Evaluate need for skin moisturizer/barrier cream  - Collaborate with interdisciplinary team   - Patient/family teaching  - Consider wound care consult   Outcome: Progressing     Problem: CARDIOVASCULAR - ADULT  Goal: Maintains optimal cardiac output and hemodynamic stability  Description  INTERVENTIONS:  - Monitor I/O, vital signs and rhythm  - Monitor for S/S and trends of decreased cardiac output  - Administer and titrate ordered vasoactive medications to optimize hemodynamic stability  - Assess quality of pulses, skin color and temperature  - Assess for signs of decreased coronary artery perfusion  - Instruct patient to report change in severity of symptoms  Outcome: Progressing  Goal: Absence of cardiac dysrhythmias or at baseline rhythm  Description  INTERVENTIONS:  - Continuous cardiac monitoring, vital signs, obtain 12 lead EKG if ordered  - Administer antiarrhythmic and heart rate control medications as ordered  - Monitor electrolytes and administer replacement therapy as ordered  Outcome: Progressing     Problem: RESPIRATORY - ADULT  Goal: Achieves optimal ventilation and oxygenation  Description  INTERVENTIONS:  - Assess for changes in respiratory status  - Assess for changes in mentation and behavior  - Position to facilitate oxygenation and minimize respiratory effort  - Oxygen administered by appropriate delivery if ordered  - Initiate smoking cessation education as indicated  - Encourage broncho-pulmonary hygiene including cough, deep breathe, Incentive Spirometry  - Assess the need for suctioning and aspirate as needed  - Assess and instruct to report SOB or any respiratory difficulty  - Respiratory Therapy support as indicated  Outcome: Progressing     Problem: METABOLIC, FLUID AND ELECTROLYTES - ADULT  Goal: Fluid balance maintained  Description  INTERVENTIONS:  - Monitor labs   - Monitor I/O and WT  - Instruct patient on fluid and nutrition as appropriate  - Assess for signs & symptoms of volume excess or deficit  Outcome: Progressing     Problem: SKIN/TISSUE INTEGRITY - ADULT  Goal: Skin integrity remains intact  Description  INTERVENTIONS  - Identify patients at risk for skin breakdown  - Assess and monitor skin integrity  - Assess and monitor nutrition and hydration status  - Monitor labs (i e  albumin)  - Assess for incontinence   - Turn and reposition patient  - Assist with mobility/ambulation  - Relieve pressure over bony prominences  - Avoid friction and shearing  - Provide appropriate hygiene as needed including keeping skin clean and dry  - Evaluate need for skin moisturizer/barrier cream  - Collaborate with interdisciplinary team (i e  Nutrition, Rehabilitation, etc )   - Patient/family teaching  Outcome: Progressing     Problem: MUSCULOSKELETAL - ADULT  Goal: Maintain or return mobility to safest level of function  Description  INTERVENTIONS:  - Assess patient's ability to carry out ADLs; assess patient's baseline for ADL function and identify physical deficits which impact ability to perform ADLs (bathing, care of mouth/teeth, toileting, grooming, dressing, etc )  - Assess/evaluate cause of self-care deficits   - Assess range of motion  - Assess patient's mobility  - Assess patient's need for assistive devices and provide as appropriate  - Encourage maximum independence but intervene and supervise when necessary  - Involve family in performance of ADLs  - Assess for home care needs following discharge   - Consider OT consult to assist with ADL evaluation and planning for discharge  - Provide patient education as appropriate  Outcome: Progressing

## 2020-07-12 NOTE — ASSESSMENT & PLAN NOTE
Multifactorial likely secondary to chronic hypercapnia given elevated bicarb on BMP possibly secondary to up obesity hypoventilation and CHF exacerbation    -patient currently awake alert and oriented, stating she is a DNR DNI however is okay with pressors if needed  -patient tolerating of BiPAP on 5 L nasal cannula  -pulmonary follow-up appreciated  -discussed with pulmonary and suggested outpatient sleep study will be arranged for BiPAP qualification  -continue oxygen supplementation, wean as tolerated

## 2020-07-12 NOTE — PROGRESS NOTES
Progress Note - Cardiology   Jocelynn Ontiveros 75 y o  female MRN: [id number]  Unit/Bed#: E4 -01 Encounter: [id number]        Problem List:  Principal Problem:    Acute on chronic diastolic heart failure ([address] )  Active Problems:    Acute on chronic respiratory failure with hypoxia and hypercapnia (HCC)    Elevated troponin    Chronic anemia    Stage 3 chronic kidney disease (HCC)    Atrial fibrillation (HCC)    Sepsis (HCC)    Left arm swelling      Asessment:  1  Acute on chronic diastolic heart failure  2  Paroxysmal atrial fibrillation  3  Cellulitis  4  Chronic kidney disease  5  Anemia of chronic disease  6  Obese  7  Pulmonary hypertension secondary to probable sleep apnea and obesity hypoventilation     Plan/ Discussion:  · Re-dose Diamox 500 x 1  · Hold p m  Torsemide  · Repeat CO2 levels tomorrow  · Amiodarone 200 mg daily, Eliquis for stroke prevention, Metoprolol succinate for rate control  · Guarded prognosis, very limited insight into her medical conditions  Not sure if she would consent to go to rehab  She really has not been out of bed for several days and tells me that at home she actually rarely leaves her bed  She has not spoken with her daughter with whom she lives in is unsure of her plans on getting home  · Albumin is 2 2, suspect this is largely contributing to her lower extremity edema    Subjective:  Feels tired today and generally weak  No other physical complaints  No chest pain or shortness of breath  No palpitations      Vitals:  Vitals:    07/11/20 0600 07/12/20 0600   Weight: 124 kg (272 lb 4 3 oz) 124 kg (272 lb 7 8 oz)   ,  Vitals:    07/11/20 2323 07/12/20 0411 07/12/20 0600 07/12/20 0700   BP: 113/59 111/53  126/58   BP Location: Right arm Left arm  Right arm   Pulse: 58 56  57   Resp: 20 20  (!) 24   Temp: (!) 97 °F (36 1 °C) (!) 96 8 °F (36 °C)  (!) 96 4 °F (35 8 °C)   TempSrc: Temporal Temporal  Temporal   SpO2: 95% 100%  97%   Weight:   124 kg (272 lb 7 8 oz)    Height: Exam:  General:  Alert and awake  Nontoxic-appearing  Heart:  Regular rate and rhythm, no murmurs  Respiratory effort:   On 5 L of oxygen breathing relatively comfortably  Lungs:  Very poor inspiratory effort but lungs do sound pretty clear   Lower Limbs:  2+ pedal edema bilaterally       Medications:    Current Facility-Administered Medications:     acetaminophen (TYLENOL) tablet 650 mg, 650 mg, Oral, Q4H PRN, Dorita Baird PA-C, 650 mg at 07/12/20 0422    amiodarone tablet 200 mg, 200 mg, Oral, Daily With Breakfast, Amanda Best MD, 200 mg at 07/12/20 0829    apixaban (ELIQUIS) tablet 5 mg, 5 mg, Oral, BID, Shalom Solis DO, 5 mg at 07/12/20 6840    calcium carbonate (TUMS) chewable tablet 1,000 mg, 1,000 mg, Oral, TID PRN, [employer] CHRISTOPHER Castrejon    cephalexin (KEFLEX) oral suspension 250 mg, 250 mg, Oral, Q8H [address], Marily Kaur MD, 250 mg at 07/12/20 0517    cyanocobalamin (VITAMIN B-12) tablet 1,000 mcg, 1,000 mcg, Oral, Daily, Dorita Baird PA-C, 1,000 mcg at 07/12/20 7760    ferrous gluconate (FERGON) tablet 324 mg, 324 mg, Oral, Daily Before Breakfast, Dorita Baird PA-C, 324 mg at 62/95/28 7792    folic acid (FOLVITE) tablet 1 mg, 1 mg, Oral, Daily, Dorita Baird PA-C, 1 mg at 07/12/20 0829    guaiFENesin (ROBITUSSIN) oral solution 200 mg, 200 mg, Oral, Q4H PRN, Marilyn Deleon MD, 200 mg at 07/08/20 1143    ipratropium-albuterol (DUO-NEB) 0 5-2 5 mg/3 mL inhalation solution 3 mL, 3 mL, Nebulization, Q6H PRN, Adiel Baker MD    metoprolol (LOPRESSOR) injection 5 mg, 5 mg, Intravenous, Q6H PRN, Daniela Zamora DO    metoprolol succinate (TOPROL-XL) 24 hr tablet 25 mg, 25 mg, Oral, Daily, Jamal Mcarthur PA-C, 25 mg at 07/12/20 0829    nitroglycerin (NITROSTAT) SL tablet 0 4 mg, 0 4 mg, Sublingual, Q5 Min PRN, Dorita Baird PA-C    nystatin (MYCOSTATIN) powder, , Topical, BID, Dorita Baird PA-C    ondansetron (ZOFRAN) injection 4 mg, 4 mg, Intravenous, Q6H PRN, [employer], CHRISTOPHER    potassium chloride (K-DUR,KLOR-CON) CR tablet 20 mEq, 20 mEq, Oral, TID With Meals, Shalom Solis DO, 20 mEq at 07/12/20 0829    senna (SENOKOT) tablet 8 6 mg, 1 tablet, Oral, HS PRN, Karl Baird PA-C    torsemide (DEMADEX) tablet 20 mg, 20 mg, Oral, BID (diuretic), Derian Dodd MD, 20 mg at 07/12/20 0829      Labs/Data:        Results from last 7 days   Lab Units 07/12/20  0502 07/08/20  0453 07/07/20  0657   WBC Thousand/uL 5 59 6 05 6 48   HEMOGLOBIN g/dL 9 7* 9 5* 9 7*   HEMATOCRIT % 33 0* 31 0* 31 3*   PLATELETS Thousands/uL 98* 121* 126*     Results from last 7 days   Lab Units 07/12/20  0502 07/11/20  0925 07/10/20  1149  07/05/20  1145   POTASSIUM mmol/L 4 2 4 2 4 1   < > 4 3   CHLORIDE mmol/L 104 103 102   < > 103   CO2 mmol/L 40* 41* 39*   < > 33*   BUN mg/dL 26* 25 24   < > 14   TROPONIN I ng/mL  --   --   --   --  0 16*    < > = values in this interval not displayed

## 2020-07-12 NOTE — PROGRESS NOTES
Progress Note - [employer] 1945, 75 y o  female MRN: [id number]    Unit/Bed#: E4 -01 Encounter: [id number]    Primary Care Provider: No primary care provider on file  Date and time admitted to hospital: 7/4/2020  5:10 PM    Acute on chronic respiratory failure with hypoxia and hypercapnia (HCC)  Assessment & Plan  Multifactorial likely secondary to chronic hypercapnia given elevated bicarb on BMP possibly secondary to up obesity hypoventilation and CHF exacerbation    -patient currently awake alert and oriented, stating she is a DNR DNI however is okay with pressors if needed  -patient tolerating of BiPAP on 5 L nasal cannula  -pulmonary follow-up appreciated  -discussed with pulmonary and suggested outpatient sleep study will be arranged for BiPAP qualification  -continue oxygen supplementation, wean as tolerated    * Acute on chronic diastolic heart failure ([facility] )  Assessment & Plan  Wt Readings from Last 3 Encounters:   07/12/20 124 kg (272 lb 7 8 oz)   05/24/20 115 kg (252 lb 10 4 oz)   04/25/20 111 kg (243 lb 11 2 oz)     Patient presented to ED in respiratory distress  Placed on BiPAP with improvement in work of breathing  Chest x-ray with significant interstitial edema  Weight up 50 lb since discharge in May, monitor daily weights  Echo showing EF of 55% with grade 2 diastolic dysfunction, mild to moderate mitral and tricuspid regurg  Cardiology consultation appreciated  Noticed to have contracture alkalosis  Hold torsemide and give Diamox  Recheck bicarb tomorrow  Daily weights and monitor intake and output, fluid restriction, salt restriction    Sepsis ([facility] )  Assessment & Plan  POA criteria met with tachycardia, tachypnea, leukocytosis  Likely secondary to cellulitis  Much improved now  Continue oral Keflex for total of 10 days  Also with fungal appearing rash under breast folds-nystatin powder b i d      Left arm swelling  Assessment & Plan  Left arm swelling and erythema  Rule out thrombophlebitis, venous Doppler pending  Can manage with ice pack    Atrial fibrillation [facility])  Assessment & Plan  Patient went into AFib with RVR  I continue amiodarone 200 mg daily  Continue Eliquis for anticoagulation  Cardiology input appreciated    Stage 3 chronic kidney disease ([address] )  Assessment & Plan  Acute kidney injury on CKD stage 3  Baseline creatinine is 1 4-1 7  Creatinine around baseline  Closely monitor renal function, if progressively increasing will consider nephrology consultation  Monitor BUN/creatinine  Avoid nephrotoxic agents, avoid hypotension    Chronic anemia  Assessment & Plan  Hemoglobin stable at 9 3, baseline 8-9  Continue folate, iron and vitamin B12 supplementation    Elevated troponin  Assessment & Plan  Troponin elevated to 0 16 at time of presentation  Likely type 2 non MI troponin elevation secondary to pulmonary edema and demand  Cardiology consult appreciated  EKG without acute ST changes  Monitor on telemetry    VTE Pharmacologic Prophylaxis:   Pharmacologic: Heparin    Patient Centered Rounds: I have performed bedside rounds with nursing staff today    Discussions with Specialists or Other Care Team Provider:     Education and Discussions with Family / Patient:     Current Length of Stay: 8 day(s)    Current Patient Status: Inpatient   Certification Statement: The patient will continue to require additional inpatient hospital stay due to Acute CHF    Discharge Plan:  Probably in 2-3 days  Awaiting placement  Code Status: Level 3 - DNAR and DNI      Subjective:   No complaints  No events overnight  Objective:     Vitals:   Temp (24hrs), Av 9 °F (36 1 °C), Min:96 4 °F (35 8 °C), Max:97 9 °F (36 6 °C)    Temp:  [96 4 °F (35 8 °C)-97 9 °F (36 6 °C)] 96 4 °F (35 8 °C)  HR:  [55-63] 57  Resp:  [18-24] 24  BP: (107-126)/(53-60) 126/58  SpO2:  [95 %-100 %] 97 %  Body mass index is 51 49 kg/m²  Input and Output Summary (last 24 hours):        Intake/Output Summary (Last 24 hours) at 7/12/2020 1045  Last data filed at 7/12/2020 0801  Gross per 24 hour   Intake 480 ml   Output 862 ml   Net -382 ml       Physical Exam:     General appearance: alert, appears stated age and cooperative  Head: Normocephalic, without obvious abnormality, atraumatic  Lungs: clear to auscultation bilaterally  Heart: regular rate and rhythm  Abdomen: soft, non-tender, positive bowel sounds   Back: negative  Extremities: edema Over the bilateral lower extremity  Left arm erythema and mild swelling  and Around the antecubital area  Neurologic: Alert and oriented X 3, normal strength and tone  Normal symmetric reflexes  Normal coordination and gait    Additional Data:     Labs:    Results from last 7 days   Lab Units 07/12/20  0502   WBC Thousand/uL 5 59   HEMOGLOBIN g/dL 9 7*   HEMATOCRIT % 33 0*   PLATELETS Thousands/uL 98*   NEUTROS PCT % 62   LYMPHS PCT % 25   MONOS PCT % 9   EOS PCT % 3     Results from last 7 days   Lab Units 07/12/20  0502   SODIUM mmol/L 146*   POTASSIUM mmol/L 4 2   CHLORIDE mmol/L 104   CO2 mmol/L 40*   BUN mg/dL 26*   CREATININE mg/dL 1 79*   ANION GAP mmol/L 2*   CALCIUM mg/dL 9 1   ALBUMIN g/dL 2 2*   TOTAL BILIRUBIN mg/dL 1 14*   ALK PHOS U/L 122*   ALT U/L 8*   AST U/L 22   GLUCOSE RANDOM mg/dL 86                           * I Have Reviewed All Lab Data Listed Above  * Additional Pertinent Lab Tests Reviewed:  [facility] Admission Reviewed    Imaging:    Imaging Reports Reviewed Today Include: images reviewed    Recent Cultures (last 7 days):           Last 24 Hours Medication List:     Current Facility-Administered Medications:  acetaminophen 650 mg Oral Q4H PRN Dorita Baird PA-C   acetaZOLAMIDE 500 mg Oral Once [employer], PA-C   amiodarone 200 mg Oral Daily With Breakfast Amanda Best MD   apixaban 5 mg Oral BID Shalom Solis DO   calcium carbonate 1,000 mg Oral TID PRN Darrin Sands PA-C   cephalexin 250 mg Oral Q8H Milton Sandra MD cyanocobalamin 1,000 mcg Oral Daily Dorita CHRISTOPHER Baird   ferrous gluconate 324 mg Oral Daily Before Breakfast Dorita Baird PA-C   folic acid 1 mg Oral Daily Dorita CHRISTOPHER Baird   guaiFENesin 200 mg Oral Q4H PRN Mami Servin MD   ipratropium-albuterol 3 mL Nebulization Q6H PRN Judson Hdez MD   metoprolol 5 mg Intravenous Q6H PRN Eduardo Schwarz DO   metoprolol succinate 25 mg Oral Daily Luis Carlos Mcarthur PA-C   nitroglycerin 0 4 mg Sublingual Q5 Min PRN Neida Pour SmCHRISTOPHER song   nystatin  Topical BID Dorita CHRISTOPHER Baird   ondansetron 4 mg Intravenous Q6H PRN Dorita CHRISTOPHER Baird   potassium chloride 20 mEq Oral TID With Meals Shalom Solis,    senna 1 tablet Oral HS PRN Emiliano Navarrete PA-C   [START ON 7/13/2020] torsemide 20 mg Oral BID (diuretic) Cameron Vergara PA-C        Today, Patient Was Seen By: Vikram Katz MD    ** Please Note: Dictation voice to text software may have been used in the creation of this document   **

## 2020-07-13 NOTE — PROGRESS NOTES
Progress Note - Cardiology   Ernesto Wasserman 75 y o  female MRN: [id number]  Unit/Bed#: E4 -01 Encounter: [id number]        Problem List:  Principal Problem:    Acute on chronic diastolic heart failure ([address] )  Active Problems:    Acute on chronic respiratory failure with hypoxia and hypercapnia (HCC)    Elevated troponin    Chronic anemia    Stage 3 chronic kidney disease (HCC)    Atrial fibrillation (HCC)    Sepsis (HCC)    Left arm swelling      Asessment:  1  Acute on chronic diastolic heart failure  2  Paroxysmal atrial fibrillation  3  Cellulitis  4  Chronic kidney disease  5  Anemia of chronic disease  6  Obese  7  Pulmonary hypertension secondary to probable sleep apnea and obesity hypoventilation   8  Significant hypoalbuminemia, 2 2    Plan/ Discussion:  She is still on 5 L of oxygen and desaturated this morning to the upper 80s with minimal exertion (turning over in bed)  In May of 2020 while hospitalized she was only on 2 L of oxygen and about 25 lb less  Respiratory failure is multifactorial (JESIKA, OHV, CHF, probable asthma)      · Consider dosing IV albumin along with reinstituting IV diuretic today  · CO2 decreased to 37  · Metoprolol for rate control, Amio for rhythm control, Eliquis for stroke prevention  · Guarded prognosis, very limited insight into her medical condition  · Renal function at baseline with creatinine of 1 7, electrolytes stable    Subjective:  Feels short of breath probably worse than normal    Vitals:  Vitals:    07/12/20 0600 07/13/20 0600   Weight: 124 kg (272 lb 7 8 oz) 126 kg (277 lb 12 5 oz)   ,  Vitals:    07/12/20 2148 07/13/20 0229 07/13/20 0600 07/13/20 0700   BP: 99/76 101/57  109/55   BP Location: Right arm Right arm  Right arm   Pulse: 61 58  62   Resp: 20 20 20   Temp: (!) 97 4 °F (36 3 °C)   (!) 97 4 °F (36 3 °C)   TempSrc: Temporal   Temporal   SpO2: 97% 98%  96%   Weight:   126 kg (277 lb 12 5 oz)    Height:           Exam:  General:  Alert awake and oriented  Heart:  Regular rate and rhythm, no murmurs  Respiratory effort:  Dyspneic on 5 L   Lungs:  Bilateral wheezing, poor inspiratory effort, unable to really hear any rales    Lower Limbs:  2+ edema bilateral        Medications:    Current Facility-Administered Medications:     acetaminophen (TYLENOL) tablet 650 mg, 650 mg, Oral, Q4H PRN, Marily Kaur MD, 650 mg at 07/12/20 0422    amiodarone tablet 200 mg, 200 mg, Oral, Daily With Breakfast, Marily Kaur MD, 200 mg at 07/12/20 5827    apixaban (ELIQUIS) tablet 5 mg, 5 mg, Oral, BID, Marily Kaur MD, 5 mg at 07/12/20 1808    calcium carbonate (TUMS) chewable tablet 1,000 mg, 1,000 mg, Oral, TID PRN, Marily Kaur MD    cephalexin [facility]) oral suspension 250 mg, 250 mg, Oral, Q8H [address], Marily Kaur MD, 250 mg at 07/13/20 0531    cyanocobalamin (VITAMIN B-12) tablet 1,000 mcg, 1,000 mcg, Oral, Daily, Marily Kaur MD, 1,000 mcg at 07/12/20 0904    ferrous gluconate (FERGON) tablet 324 mg, 324 mg, Oral, Daily Before Breakfast, Marily Kaur MD, 324 mg at 06/75/05 0775    folic acid (FOLVITE) tablet 1 mg, 1 mg, Oral, Daily, Marily Kaur MD, 1 mg at 07/12/20 0829    guaiFENesin (ROBITUSSIN) oral solution 200 mg, 200 mg, Oral, Q4H PRN, Marily Kaur MD, 200 mg at 07/08/20 1143    ipratropium-albuterol (DUO-NEB) 0 5-2 5 mg/3 mL inhalation solution 3 mL, 3 mL, Nebulization, Q6H PRN, Marily Kaur MD    metoprolol (LOPRESSOR) injection 5 mg, 5 mg, Intravenous, Q6H PRN, Marily Kaur MD    metoprolol succinate (TOPROL-XL) 24 hr tablet 25 mg, 25 mg, Oral, Daily, Marily Kaur MD, 25 mg at 07/12/20 0829    nitroglycerin (NITROSTAT) SL tablet 0 4 mg, 0 4 mg, Sublingual, Q5 Min PRN, Marily Kaur MD    nystatin (MYCOSTATIN) powder, , Topical, BID, Marily Kaur MD    ondansetron [facility]) injection 4 mg, 4 mg, Intravenous, Q6H PRN, Marily Kaur MD    potassium chloride (K-DUR,KLOR-CON) CR tablet 20 mEq, 20 mEq, Oral, TID With Meals, Marily Kaur MD, 20 mEq at 07/12/20 1550    senna (SENOKOT) tablet 8 6 mg, 1 tablet, Oral, HS PRN, Jeremiah Hubbard MD    torsemide [facility]) tablet 20 mg, 20 mg, Oral, BID (diuretic), Jeremiah Hubbard MD      Labs/Data:        Results from last 7 days   Lab Units 07/13/20  0524 07/12/20  0502 07/08/20  0453   WBC Thousand/uL 5 99 5 59 6 05   HEMOGLOBIN g/dL 9 5* 9 7* 9 5*   HEMATOCRIT % 32 0* 33 0* 31 0*   PLATELETS Thousands/uL 99* 98* 121*     Results from last 7 days   Lab Units 07/13/20  0524 07/12/20  0502 07/11/20  0925   POTASSIUM mmol/L 4 3 4 2 4 2   CHLORIDE mmol/L 105 104 103   CO2 mmol/L 37* 40* 41*   BUN mg/dL 28* 26* 25

## 2020-07-13 NOTE — PROGRESS NOTES
Progress Note - Elisa Shetty 1945, 75 y o  female MRN: [id number]    Unit/Bed#: E4 -01 Encounter: [id number]    Primary Care Provider: No primary care provider on file  Date and time admitted to hospital: 2020  5:10 PM        Stage 3 chronic kidney disease [facility])  Assessment & Plan  Ask renal to see as we are putting patient on a lasix drip  Acute on chronic respiratory failure with hypoxia and hypercapnia (HCC)  Assessment & Plan  Due to diastolic CHF, obesity hypoventilation, likely sleep apnea  Needs outpatient sleep study    She is on BIPAP qHS and currently 6 liters oxygen    * Acute on chronic diastolic heart failure (HCC)  Assessment & Plan  Spoke with Cards  Starting Lasix drip  She is still extremely fluid overloaded  Cards asked for renal consult so that we due not cause SACHA          Subjective:   Still conversationally dyspneic  Has a lot of leg swelling  Has not been able to get out of chair due to SOB      Objective:     Vitals:   Temp (24hrs), Av 4 °F (36 3 °C), Min:97 °F (36 1 °C), Max:97 6 °F (36 4 °C)    Temp:  [97 °F (36 1 °C)-97 6 °F (36 4 °C)] 97 6 °F (36 4 °C)  HR:  [54-62] 60  Resp:  [20] 20  BP: ()/(55-76) 106/55  SpO2:  [94 %-98 %] 94 %  Body mass index is 52 49 kg/m²  Input and Output Summary (last 24 hours): Intake/Output Summary (Last 24 hours) at 2020 1245  Last data filed at 2020 0901  Gross per 24 hour   Intake 480 ml   Output 2421 ml   Net -1941 ml       Physical Exam:     Physical Exam   HENT:   Head: Normocephalic and atraumatic  Eyes: Pupils are equal, round, and reactive to light  EOM are normal    Cardiovascular: Normal rate and regular rhythm  Exam reveals no gallop and no friction rub  No murmur heard  Pulmonary/Chest: Effort normal and breath sounds normal  She has no wheezes  She has no rales  Abdominal: Soft  Bowel sounds are normal  There is no tenderness     Obese     Musculoskeletal: She exhibits edema (2+ pitting edema bilat LE up to knees)  Nursing note and vitals reviewed          Additional Data:     Labs:    Results from last 7 days   Lab Units 07/13/20  0524   WBC Thousand/uL 5 99   HEMOGLOBIN g/dL 9 5*   HEMATOCRIT % 32 0*   PLATELETS Thousands/uL 99*   NEUTROS PCT % 65   LYMPHS PCT % 23   MONOS PCT % 9   EOS PCT % 2     Results from last 7 days   Lab Units 07/13/20  0524 07/12/20  0502   POTASSIUM mmol/L 4 3 4 2   CHLORIDE mmol/L 105 104   CO2 mmol/L 37* 40*   BUN mg/dL 28* 26*   CREATININE mg/dL 1 70* 1 79*   CALCIUM mg/dL 9 3 9 1   ALK PHOS U/L  --  122*   ALT U/L  --  8*   AST U/L  --  22                       * I Have Reviewed All Lab Data     Recent Cultures (last 7 days):             Last 24 Hours Medication List:     Current Facility-Administered Medications:  acetaminophen 650 mg Oral Q4H PRN Dante De Santiago MD   amiodarone 200 mg Oral Daily With Breakfast Dante De Santiago MD   apixaban 5 mg Oral BID Dante De Santiago MD   calcium carbonate 1,000 mg Oral TID PRN Dante De Santiago MD   cephalexin 250 mg Oral [facility] Dante De Santiago MD   cyanocobalamin 1,000 mcg Oral Daily Dante De Santiago MD   ferrous gluconate 324 mg Oral Daily Before Breakfast Dante De Santiago MD   folic acid 1 mg Oral Daily Dante De Santiago MD   guaiFENesin 200 mg Oral Q4H PRN Dante De Santiago MD   ipratropium-albuterol 3 mL Nebulization Q6H PRN Dante De Santiago MD   metoprolol 5 mg Intravenous Q6H PRN Dante De Santiago MD   metoprolol succinate 25 mg Oral Daily Dante De Santiago MD   nitroglycerin 0 4 mg Sublingual Q5 Min PRN Dante De Santiago MD   nystatin  Topical BID Dante De Santiago MD   ondansetron 4 mg Intravenous Q6H PRN Dante De Santiago MD   potassium chloride 20 mEq Oral TID With Meals Dante De Santiago MD   senna 1 tablet Oral HS PRN Dante De Santiago MD   torsemide 20 mg Oral BID (diuretic) Dante De Santiago MD         VTE Pharmacologic Prophylaxis:   Pharmacologic: Apixaban (Eliquis)      Current Length of Stay: 9 day(s)    Current Patient Status: Inpatient       Discharge Plan:       Code Status: Level 3 - DNAR and DNI           Today, Patient Was Seen By: Fely Alba DO    ** Please Note: Dictation voice to text software may have been used in the creation of this document   **

## 2020-07-13 NOTE — PHYSICAL THERAPY NOTE
Physical Therapy Cancellation Note            Attempted to see pt fpr PT tx interventions, however pt on bi- pap and sleeping soundly, will attempt at another time    Amauri Tobias, MADDIE

## 2020-07-13 NOTE — CONSULTS
Inpatient Consultation - [facility]    Patient Information: Bridger Cevallos 75 y o  female MRN: [id number]  Unit/Bed#: E4 -01 Encounter: [id number]  PCP: No primary care provider on file  Date of Admission:  7/4/2020  Date of Consultation: 07/13/20  Requesting Physician: Lisa Reyes DO    Reason For Consultation:  Acute on chronic CHF requiring Lasix drip and possible albumin infusion  Assessment/Plan:    1  Acute on chronic diastolic heart failure  Weight up 50 lbs since May  Patient lost 25 lbs since hospital admission, not at dry weight   Was found to be in severe respiratory distress requiring with increased work of breathing, requiring Bipap  Currently on 5 L NC  Cardiology on board with recommendation for Lasix drip along with IV albumin   Will initiate Bumex 2 mg IV BID along with Albumin 25 g Q 6 hrs x 4 doses   Will assess in am if patient needs Bumex drip  Currently on Torsemide 20 mg BID, will d/c for now  She also received yesterday Diamox 500 x 1  BMP tomorrow morning to assess kidney function  Daily weights and cardiac diet      2  Acute on chronic respiratory failure with hypoxia and hypercapnia  Most likely etiology in the setting of volume overload 2/2 to acute on chronic diastolic HF, obesity hypoventilation, suspected JESIKA  Currently on 5L NC, and Bipap QHS  Will need outpatient sleep study  Monitor respiratory status     3  Hypotension   SBP in the  range   Maintain BP >120  Monitor VS      4  Stage 3 CKD  Baseline Cr 1 4-1 7  Admission Cr: 1 38, went up to 1 79 (07/12), Today Cr 1 70  Bladder scan protocol   BMP in am     5  Metabolic alkalosis   CO2 32 on admission, trended up to 40, currently down at 37  Most likely in the setting of JESIKA, Obesity hypoventilation, fluid overload state   Will continue to monitor     6   Hypoalbuminemia  In the setting of her overload status  Albumin 2 2  Will initiate Albumin 25 g Q 6 hrs for a total of 4 doses     VTE Prophylaxis: Heparin / sequential compression device     Recommendations for Discharge:  · Per primary team    Counseling / Coordination of Care Time: 45 minutes  Greater than 50% of total time spent on patient counseling and coordination of care  Collaboration of Care: Were Recommendations Directly Discussed with Primary Treatment Team? - Yes     History of Present Illness:    Dalton Capellan is a 75 y o  female with PMH of CHF, Paroxysmal A-fib, obesity, Stage 3 CKD, chronic anemia, pulmonary HTN who is originally admitted to the [address] [place] [address] inpatient service on 7/4/2020 due to worsening SOB and bilateral LE edema  Daughter, Esme Daughters, present a bedside she is currently taking care of her mother at home  Patient's daughter states her mother has been gained a lot of weights and her O2 requirement at home (2L as needed) went up to 5 L the last two days prior to ED presentation  Lower extremity edema has also worsened, up to her thighs  When she came to ED she was found hypoxic, hypertensive, in respiratory distress being placed on Bipap  Labs showed elevated Pro-Bnp at 8, 210, trop (initially up to 0 16), lactic acid  CXR on admission showed cardiomegaly, CHF and possible small right pleural effusion  Repeat CXR today with evidence of "bilateral pulmonary consolidation, either pulmonary edema or bilateral pneumonia, probable pleural effusions"  We are consulted for initiation of Lasix drip and possible albumin infusion  Currently, patient is sited in the chair, on 5 L NC  States she feels short of breath  Denies fevers, chills, chest pain, GI and urinary sx  Went over the labs and imaging with patient and her daughter, explaining the possibility of requiring diuretic drip given her extensive edema and shortness of breath  Patient and daughter agreeable to plan  Review of Systems:    Review of Systems   Constitutional: Positive for unexpected weight change  Negative for chills, fatigue and fever          50 lbs weight gain    HENT: Negative for sore throat and trouble swallowing  Eyes: Negative for visual disturbance  Respiratory: Positive for shortness of breath  Negative for cough  Cardiovascular: Positive for chest pain and leg swelling  Intermittent chest tightness    Gastrointestinal: Negative for abdominal pain, blood in stool, constipation, diarrhea, nausea and vomiting  Genitourinary: Negative for dysuria and hematuria  Neurological: Positive for weakness  Negative for dizziness and headaches  Psychiatric/Behavioral: Negative for agitation  Past Medical and Surgical History:   Past Medical History:   Diagnosis Date    Atrial fibrillation (HCC)     Chronic anemia     CKD (chronic kidney disease)     Diastolic CHF ([facility] [address] )      Past Surgical History:   Procedure Laterality Date    APPENDECTOMY      HYSTERECTOMY      TONSILLECTOMY       Meds/Allergies: Allergies: Allergies   Allergen Reactions    Sulfa Antibiotics Hallucinations     Prior to Admission Medications   Prescriptions Last Dose Informant Patient Reported? Taking?    apixaban (ELIQUIS) 5 mg   No No   Sig: Take 1 tablet (5 mg total) by mouth 2 (two) times a day   aspirin 81 mg chewable tablet   No No   Sig: Chew 1 tablet (81 mg total) daily   cyanocobalamin (VITAMIN B-12) 1000 MCG tablet   No No   Sig: Take 1 tablet (1,000 mcg total) by mouth daily   ferrous gluconate (FERGON) 324 mg tablet   No No   Sig: Take 1 tablet (324 mg total) by mouth daily before breakfast   folic acid (FOLVITE) 1 mg tablet   No No   Sig: Take 1 tablet (1 mg total) by mouth daily   furosemide (LASIX) 40 mg tablet   No No   Sig: Take 1 tablet (40 mg total) by mouth daily   metoprolol succinate (TOPROL-XL) 50 mg 24 hr tablet   No No   Sig: Take 1 tablet (50 mg total) by mouth daily   nitroglycerin (NITROSTAT) 0 4 mg SL tablet   No No   Sig: Place 1 tablet (0 4 mg total) under the tongue every 5 (five) minutes as needed for chest pain Facility-Administered Medications: None     Social History:     Social History     Socioeconomic History    Marital status:      Spouse name: Not on file    Number of children: Not on file    Years of education: Not on file    Highest education level: Not on file   Occupational History    Not on file   Social Needs    Financial resource strain: Not on file    Food insecurity:     Worry: Not on file     Inability: Not on file    Transportation needs:     Medical: Not on file     Non-medical: Not on file   Tobacco Use    Smoking status: Former Smoker    Smokeless tobacco: Never Used   Substance and Sexual Activity    Alcohol use: Not Currently     Alcohol/week: 1 0 standard drinks     Types: 1 Shots of liquor per week     Frequency: Monthly or less     Drinks per session: 1 or 2     Binge frequency: Never    Drug use: Never    Sexual activity: Not Currently     Partners: Male   Lifestyle    Physical activity:     Days per week: Not on file     Minutes per session: Not on file    Stress: Not on file   Relationships    Social connections:     Talks on phone: Not on file     Gets together: Not on file     Attends Mu-ism service: Not on file     Active member of club or organization: Not on file     Attends meetings of clubs or organizations: Not on file     Relationship status: Not on file    Intimate partner violence:     Fear of current or ex partner: Not on file     Emotionally abused: Not on file     Physically abused: Not on file     Forced sexual activity: Not on file   Other Topics Concern    Not on file   Social History Narrative    Not on file       Family History:  No family history on file      Physical Exam:     Vitals:   Blood Pressure: 106/55 (07/13/20 1100)  Pulse: 60 (07/13/20 1100)  Temperature: 97 6 °F (36 4 °C) (07/13/20 1100)  Temp Source: Tympanic (07/13/20 1100)  Respirations: 20 (07/13/20 1100)  Height: 5' 1" (154 9 cm) (07/04/20 2027)  Weight - Scale: 126 kg (277 lb 12 5 oz) (07/13/20 0600)  SpO2: 94 % (07/13/20 1100)    Physical Exam   Constitutional: She is oriented to person, place, and time  She appears well-developed and well-nourished  Mild conversational dyspnea   On 5 L NC  Morbid obese patient, sitting in the chair    HENT:   Head: Normocephalic and atraumatic  Nose: Nose normal    Mouth/Throat: Oropharynx is clear and moist    Eyes: Conjunctivae and EOM are normal    Neck: Normal range of motion  Neck supple  Was not able to assess for JVD    Cardiovascular: Normal rate and regular rhythm  Pulmonary/Chest:   Poor inspiratory effort, unable to auscultate for any abnormal breath sounds, any rales/wheezes     Abdominal: Soft  She exhibits distension  There is no tenderness  There is no guarding  Musculoskeletal: Normal range of motion  She exhibits edema  She exhibits no tenderness  2+ pitting BLE edema up thigh level  SCDs on   Neurological: She is alert and oriented to person, place, and time  Skin: Skin is warm  She is not diaphoretic  Psychiatric: She has a normal mood and affect  Her behavior is normal  Judgment and thought content normal    Nursing note and vitals reviewed  Additional Data:     Lab Results: I have personally reviewed pertinent reports  Results from last 7 days   Lab Units 07/13/20  0524   WBC Thousand/uL 5 99   HEMOGLOBIN g/dL 9 5*   HEMATOCRIT % 32 0*   PLATELETS Thousands/uL 99*   NEUTROS PCT % 65   LYMPHS PCT % 23   MONOS PCT % 9   EOS PCT % 2     Results from last 7 days   Lab Units 07/13/20  0524 07/12/20  0502   POTASSIUM mmol/L 4 3 4 2   CHLORIDE mmol/L 105 104   CO2 mmol/L 37* 40*   BUN mg/dL 28* 26*   CREATININE mg/dL 1 70* 1 79*   CALCIUM mg/dL 9 3 9 1   ALK PHOS U/L  --  122*   ALT U/L  --  8*   AST U/L  --  22           Imaging: I have personally reviewed pertinent reports  Xr Chest Portable    Result Date: 7/13/2020  Narrative: CHEST INDICATION:   Dyspnea   COMPARISON:  July 8, 2020 EXAM PERFORMED/VIEWS:  XR CHEST PORTABLE FINDINGS: Heart enlarged  Pulmonary vessels prominent and indistinct  Extensive bilateral pulmonary consolidation, most likely pulmonary edema or, possibly, bilateral pneumonia  Suggestion of bilateral pleural effusions  Osseous structures appear within normal limits for patient age  Impression: Bilateral pulmonary consolidation, either pulmonary edema or bilateral pneumonia  Probable pleural effusions  Workstation performed: [id number]     Xr Chest Portable    Result Date: 7/8/2020  Narrative: CHEST INDICATION:   Shortness of breath  COMPARISON:  Compared with 7/4/2020 EXAM PERFORMED/VIEWS:  XR CHEST PORTABLE FINDINGS: Poor inspiratory film  Cardiac silhouette is enlarged  Thoracic aorta is uncoiled    Bibasilar haziness obscuring hemidiaphragms and costophrenic angles  Patchy densities in the right lung base  No pneumothorax  Impression: Cardiomegaly  Bilateral small effusions  Right lung base atelectasis or infiltrate cannot be excluded  Workstation performed: [id number]     Xr Chest Portable - 1 View    Result Date: 7/5/2020  Narrative: CHEST INDICATION:   dyspnea  COMPARISON:  05/20/2020 EXAM PERFORMED/VIEWS:  XR CHEST PORTABLE Images: 2 FINDINGS: Cardiomediastinal silhouette appears enlarged  Patient has taken a suboptimal inspiration  The lungs are clear  No pneumothorax  Patient is in CHF  Possible right pleural effusion  Osseous structures appear within normal limits for patient age  Impression: Cardiomegaly  CHF  Possible small right pleural effusion  Workstation performed: [id number]     Vas Upper Limb Venous Duplex Scan, Unilateral/limited    Result Date: 7/13/2020  Narrative:  [facility] REPORT [facility]INICAL: Indications: Patient presents with left upper extremity edema  Risk Factors The patient has history of Obesity, CHF, A FIB, and CKD    FINDINGS:  Segment          Right            Left                              Impression       Impression Innominate Vein                   Normal (Patent)  Int  Jugular     Normal (Patent)  Normal (Patent)  Subclavian       Normal (Patent)  Normal (Patent)     CONCLUSION: RIGHT UPPER LIMB LIMITED: Evaluation shows no evidence of thrombus in the internal jugular vein and subclavian vein  LEFT UPPER LIMB: No evidence of acute or chronic deep vein thrombosis  No evidence of superficial thrombophlebitis noted  Doppler evaluation shows a normal response to augmentation maneuvers  ** Please Note: This note has been constructed using a voice recognition system   Stefania Irvin MD  07/13/20  3:12 PM

## 2020-07-13 NOTE — OCCUPATIONAL THERAPY NOTE
Occupational Therapy  OT tx session cancelled  Pt soundly sleeping with Bi-pap in place  Will continue to follow as available   CHACHO Real

## 2020-07-13 NOTE — ASSESSMENT & PLAN NOTE
Spoke with Cards  Starting Lasix drip  She is still extremely fluid overloaded      Cards asked for renal consult so that we due not cause SACHA

## 2020-07-13 NOTE — ASSESSMENT & PLAN NOTE
Due to diastolic CHF, obesity hypoventilation, likely sleep apnea  Needs outpatient sleep study    She is on BIPAP qHS and currently 6 liters oxygen

## 2020-07-14 PROBLEM — Z51.5 PALLIATIVE CARE STATUS: Status: ACTIVE | Noted: 2020-01-01

## 2020-07-14 NOTE — PROGRESS NOTES
Follow up Consultation    Nephrology   Venancio Perez 75 y o  female MRN: [id number]  Unit/Bed#: E4 -01 Encounter: [id number]      Physician Requesting Consult: David Alcala DO  Reason for Consult:  CKD stage 3/volume overload    27-year-old female past medical history of AFib, obesity, CHF, anemia, pulmonary hypertension and CKD stage 3 admitted with worsening shortness of breath and lower extremity edema  Nephrology consulted for evaluation and management with assistance with diuresis        ASSESSMENT:     · CKD stage 3/volume overload:   - patient had normal baseline serum creatinine however after episode of acute kidney injury in March and May of 2020 appears to have CKD stage 3  - After review of records it appears that the patient has a baseline Creatinine of 1 4-1 7 mg/dL  - patient was admitted with a creatinine of 1 38 mg/dL  - patient's creatinine today is at 1 69 mg/dL  · H&H/anemia:  - most recent hemoglobin at 8 5 grams/deciliter  · Acid-base electrolytes:  ? Hypokalemia:   on K-Dur to 20 mEq p o  T i d  most recent potassium at 3 9  ? Metabolic alkalosis:  Some component of obesity hypoventilation  Monitor for now  · Blood pressure: On Toprol-XL 25 mg p o  Q day  · Volume status:  Hypervolemic  · Proteinuria:  No proteinuria noted in May  · Other medical problems:  ? Diabetes:  Management per primary team   On insulin  ? Acute on chronic CHF:  Management per primary team   Follow-up with cardiology adjust diuretics  ? Acute hypoxic respiratory failure:  Adjust diuretics  Management per primary team   Consider CT chest            PLAN:  · Continue on albumin 25 g IV Q 6 x 4 doses  · Continue on Bumex 2 mg IV b i d  likely convert over to p o  in the next 24-48 hours   · Change K-Dur to 40 mEq p o  B i d   · Follow-up with cardiology  · Optimize hemodynamic status to avoid delay in renal recovery  · check BMP, magnesium, phosphorus in a m    · Place on a renal diet when allowed diet order    · Strict I/O   · Daily weights  · Urinary retention protocol  · Avoid nephrotoxins, adjust meds to appropriate GFR  · 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 syndrome plus age-related nephron loss  · will need to follow up with Nephrology post hospital discharge with blood work prior to the visit  · Optimize hemodynamics  · Maintain MAP > 65mmHg  · Avoid BP fluctuations  · Maintain hemoglobin greater than 8 grams/deciliter     Thanks for the consult  Will continue to follow  Please call with questions/ concerns  Above-mentioned orders and Plan in terms of diuretics was discussed with the team in [address] DAMARIS Guerra [facility], 12:42 PM                Objective :   Patient seen and examined in her room no overnight events hemodynamically stable remains afebrile good diuretic response to Bumex weight down by 4 kilos  Urine output close to 3 2 L in the last 24 hours  With nasal cannula in place shortness of breath slightly improved      PHYSICAL EXAM  /75 (BP Location: Left arm)   Pulse 72   Temp (!) 97 3 °F (36 3 °C) (Axillary)   Resp 18   Ht 5' 1" (1 549 m)   Wt 122 kg (269 lb 13 5 oz)   SpO2 90%   BMI 50 99 kg/m²   Temp (24hrs), Av 2 °F (36 2 °C), Min:97 °F (36 1 °C), Max:97 4 °F (36 3 °C)        Intake/Output Summary (Last 24 hours) at 2020 1242  Last data filed at 2020 0820  Gross per 24 hour   Intake 200 ml   Output 3200 ml   Net -3000 ml       I/O last 24 hours: In: 560 [P O :410; IV Piggyback:150]  Out: 3200 [Urine:3200]      Current Weight: Weight - Scale: 122 kg (269 lb 13 5 oz)  First Weight: Weight - Scale: (!) 137 kg (302 lb 4 oz)  Physical Exam   Constitutional: She is oriented to person, place, and time  She appears well-developed and well-nourished  No distress  HENT:   Head: Normocephalic and atraumatic  Mouth/Throat: Oropharynx is clear and moist  No oropharyngeal exudate     Eyes: Conjunctivae are normal  No scleral icterus  Neck: Normal range of motion  Neck supple  Cardiovascular: Normal heart sounds  Exam reveals no friction rub  Pulmonary/Chest: Effort normal  She has no wheezes  Course breath sounds   Abdominal: Soft  She exhibits no mass  There is no tenderness  Musculoskeletal: She exhibits edema  Plus one edema bilateral upper extremities and lower extremities   Neurological: She is alert and oriented to person, place, and time  Skin: Skin is warm  No rash noted  She is not diaphoretic  Psychiatric: She has a normal mood and affect  Nursing note and vitals reviewed  Review of Systems   Constitutional: Positive for fatigue  Negative for appetite change  HENT: Negative for congestion and sore throat  Respiratory: Negative for cough, shortness of breath and wheezing  Cardiovascular: Positive for leg swelling  Gastrointestinal: Negative for abdominal pain, constipation, diarrhea, nausea and vomiting  Genitourinary: Negative for difficulty urinating and hematuria  Musculoskeletal: Negative for back pain  Neurological: Negative for dizziness and headaches  Psychiatric/Behavioral: Negative for agitation  All other systems reviewed and are negative        Scheduled Meds:  Current Facility-Administered Medications:  acetaminophen 650 mg Oral Q4H PRN Opal Cordoba MD   amiodarone 200 mg Oral Daily With Breakfast Opal Cordoba MD   apixaban 5 mg Oral BID Opal Cordoba MD   bumetanide 2 mg Intravenous BID Neil Purvis MD   calcium carbonate 1,000 mg Oral TID PRN Opal Cordoba MD   cephalexin 250 mg Oral [facility] Opal Cordoba MD   cyanocobalamin 1,000 mcg Oral Daily Opal Cordoba MD   ferrous gluconate 324 mg Oral Daily Before Breakfast Opal Cordoba MD   folic acid 1 mg Oral Daily Opal Cordoba MD   guaiFENesin 200 mg Oral Q4H PRN Opal Cordoba MD   ipratropium-albuterol 3 mL Nebulization Q6H PRN Opal Cordoba MD   metoprolol 5 mg Intravenous Q6H PRN Opal Cordoba MD   metoprolol succinate 25 mg Oral Daily Pelon Timmons MD   nitroglycerin 0 4 mg Sublingual Q5 Min PRN Pelon Timmons MD   nystatin  Topical BID Pelon Timmons MD   ondansetron 4 mg Intravenous Q6H PRN Pelon Timmons MD   potassium chloride 20 mEq Oral TID With Meals Pelon Timmons MD   senna 1 tablet Oral HS PRN Pelon Timmons MD       PRN Meds:   acetaminophen    calcium carbonate    guaiFENesin    ipratropium-albuterol    metoprolol    nitroglycerin    ondansetron    senna    Continuous Infusions:       Invasive Devices: Invasive Devices     Peripheral Intravenous Line            Peripheral IV 07/11/20 Right;Distal Antecubital 3 days          Drain            External Urinary Catheter 2 days                  LABORATORY:    Results from last 7 days   Lab Units 07/14/20  0541 07/13/20  0524 07/12/20  0502 07/11/20  0925 07/10/20  1149 07/09/20  0535 07/08/20  0453   WBC Thousand/uL 5 02 5 99 5 59  --   --   --  6 05   HEMOGLOBIN g/dL 8 5* 9 5* 9 7*  --   --   --  9 5*   HEMATOCRIT % 28 3* 32 0* 33 0*  --   --   --  31 0*   PLATELETS Thousands/uL 89* 99* 98*  --   --   --  121*   POTASSIUM mmol/L 3 9 4 3 4 2 4 2 4 1 3 8 3 9   CHLORIDE mmol/L 103 105 104 103 102 101 101   CO2 mmol/L 39* 37* 40* 41* 39* 39* 33*   BUN mg/dL 30* 28* 26* 25 24 22 22   CREATININE mg/dL 1 69* 1 70* 1 79* 1 76* 1 79* 1 64* 1 70*   CALCIUM mg/dL 9 7 9 3 9 1 8 8 8 7 8 8 8 7   MAGNESIUM mg/dL  --   --  2 1  --   --   --   --       rest all reviewed    RADIOLOGY:  VAS upper limb venous duplex scan, unilateral/limited   Final Result by Ag Stiles DO (07/13 1252)      XR chest portable   Final Result by Kitty Duvall MD (07/13 1308)      Bilateral pulmonary consolidation, either pulmonary edema or bilateral pneumonia  Probable pleural effusions  Workstation performed: [id number]         XR chest portable   Final Result by Aleta Sharma MD (07/08 0920)      Cardiomegaly  Bilateral small effusions    Right lung base atelectasis or infiltrate cannot be excluded  Workstation performed: [id number]         XR chest portable - 1 view   ED Interpretation by Frank Salinas DO (07/04 1822)   Abnormal   Cardiomeg, increased markings/ CHF      Final Result by Gregg Dean MD (07/05 8420)      Cardiomegaly  CHF  Possible small right pleural effusion  Workstation performed: [id number]           Rest all reviewed    Portions of the record may have been created with voice recognition software  Occasional wrong word or "sound a like" substitutions may have occurred due to the inherent limitations of voice recognition software  Read the chart carefully and recognize, using context, where substitutions have occurred  If you have any questions, please contact the dictating provider

## 2020-07-14 NOTE — NURSING NOTE
Pt not tolerating BiPAP, wants off  However Sp02 at 83-88% on 6 L NC and pt severely c/o SOB  Attending MD contacted and would like pt on Optiflow and ABG ordered  Lungs auscultated to coarse rhonchi throughout, weak, nonproductive cough  Continuous pulse oximeter remains  Pt tearfully crying, "I just wanna go home " Now satting 93% on Bipap   Will continue to monitor closely

## 2020-07-14 NOTE — ASSESSMENT & PLAN NOTE
Appreciate cardiology and nephrology help    Now on Bumex    She looks a little more comfortable today

## 2020-07-14 NOTE — ASSESSMENT & PLAN NOTE
Patient expressed to nurse that she thinks she is going to die soon  Will ask palliative care to see

## 2020-07-14 NOTE — PROGRESS NOTES
Called to see patient    S:  She is complaining of SOB  She does ok with BIPAP, but it is not comfortable  She denies chest pain  She denies cough    She confirms that she is DNR/DNI      o  Afebrile  Lung:   Coarse BS bilat  No R/R/W    Labs  Reviewed    A/p  1  SOB  Likely still the CHF and she does not tolerate BIPAP well  I called the daughter and updated her about the poor breathing    Check ABG  Check CXR    Try optiflow to see if she is more comfortable

## 2020-07-14 NOTE — PLAN OF CARE
Problem: Potential for Falls  Goal: Patient will remain free of falls  Description  INTERVENTIONS:  - Assess patient frequently for physical needs  -  Identify cognitive and physical deficits and behaviors that affect risk of falls    -  [place] fall precautions as indicated by assessment   - Educate patient/family on patient safety including physical limitations  - Instruct patient to call for assistance with activity based on assessment  - Modify environment to reduce risk of injury  - Consider OT/PT consult to assist with strengthening/mobility  Outcome: Progressing     Problem: Prexisting or High Potential for Compromised Skin Integrity  Goal: Skin integrity is maintained or improved  Description  INTERVENTIONS:  - Identify patients at risk for skin breakdown  - Assess and monitor skin integrity  - Assess and monitor nutrition and hydration status  - Monitor labs   - Assess for incontinence   - Turn and reposition patient  - Assist with mobility/ambulation  - Relieve pressure over bony prominences  - Avoid friction and shearing  - Provide appropriate hygiene as needed including keeping skin clean and dry  - Evaluate need for skin moisturizer/barrier cream  - Collaborate with interdisciplinary team   - Patient/family teaching  - Consider wound care consult   Outcome: Progressing     Problem: CARDIOVASCULAR - ADULT  Goal: Maintains optimal cardiac output and hemodynamic stability  Description  INTERVENTIONS:  - Monitor I/O, vital signs and rhythm  - Monitor for S/S and trends of decreased cardiac output  - Administer and titrate ordered vasoactive medications to optimize hemodynamic stability  - Assess quality of pulses, skin color and temperature  - Assess for signs of decreased coronary artery perfusion  - Instruct patient to report change in severity of symptoms  Outcome: Progressing  Goal: Absence of cardiac dysrhythmias or at baseline rhythm  Description  INTERVENTIONS:  - Continuous cardiac monitoring, vital signs, obtain 12 lead EKG if ordered  - Administer antiarrhythmic and heart rate control medications as ordered  - Monitor electrolytes and administer replacement therapy as ordered  Outcome: Progressing     Problem: RESPIRATORY - ADULT  Goal: Achieves optimal ventilation and oxygenation  Description  INTERVENTIONS:  - Assess for changes in respiratory status  - Assess for changes in mentation and behavior  - Position to facilitate oxygenation and minimize respiratory effort  - Oxygen administered by appropriate delivery if ordered  - Initiate smoking cessation education as indicated  - Encourage broncho-pulmonary hygiene including cough, deep breathe, Incentive Spirometry  - Assess the need for suctioning and aspirate as needed  - Assess and instruct to report SOB or any respiratory difficulty  - Respiratory Therapy support as indicated  Outcome: Progressing     Problem: METABOLIC, FLUID AND ELECTROLYTES - ADULT  Goal: Fluid balance maintained  Description  INTERVENTIONS:  - Monitor labs   - Monitor I/O and WT  - Instruct patient on fluid and nutrition as appropriate  - Assess for signs & symptoms of volume excess or deficit  Outcome: Progressing     Problem: SKIN/TISSUE INTEGRITY - ADULT  Goal: Skin integrity remains intact  Description  INTERVENTIONS  - Identify patients at risk for skin breakdown  - Assess and monitor skin integrity  - Assess and monitor nutrition and hydration status  - Monitor labs (i e  albumin)  - Assess for incontinence   - Turn and reposition patient  - Assist with mobility/ambulation  - Relieve pressure over bony prominences  - Avoid friction and shearing  - Provide appropriate hygiene as needed including keeping skin clean and dry  - Evaluate need for skin moisturizer/barrier cream  - Collaborate with interdisciplinary team (i e  Nutrition, Rehabilitation, etc )   - Patient/family teaching  Outcome: Progressing     Problem: MUSCULOSKELETAL - ADULT  Goal: Maintain or return mobility to safest level of function  Description  INTERVENTIONS:  - Assess patient's ability to carry out ADLs; assess patient's baseline for ADL function and identify physical deficits which impact ability to perform ADLs (bathing, care of mouth/teeth, toileting, grooming, dressing, etc )  - Assess/evaluate cause of self-care deficits   - Assess range of motion  - Assess patient's mobility  - Assess patient's need for assistive devices and provide as appropriate  - Encourage maximum independence but intervene and supervise when necessary  - Involve family in performance of ADLs  - Assess for home care needs following discharge   - Consider OT consult to assist with ADL evaluation and planning for discharge  - Provide patient education as appropriate  Outcome: Progressing

## 2020-07-14 NOTE — NURSING NOTE
Pt  Keeps taking  off bipap mask causing pulse ox to drop as low at 59% and then states she can't breathe  Explained multiple times that if she would like to come off bipap to call nursing staff as we can place her on alternative oxygen which would not cause such a drastic drop in oxygen level  Pt  Arelsa Horner back on bipap to recover will continue to monitor

## 2020-07-14 NOTE — PROGRESS NOTES
Progress Note - Cardiology   Conception Marine 75 y o  female MRN: [id number]  Unit/Bed#: E4 -01 Encounter: [id number]        Problem List:  Principal Problem:    Acute on chronic diastolic heart failure ([address] )  Active Problems:    Acute on chronic respiratory failure with hypoxia and hypercapnia (HCC)    Elevated troponin    Chronic anemia    Stage 3 chronic kidney disease (HCC)    Atrial fibrillation (HCC)    Sepsis (HCC)    Left arm swelling      Asessment:  1  Acute on chronic diastolic heart failure  2  Paroxysmal atrial fibrillation  3  Cellulitis  4  Chronic kidney disease  5  Anemia of chronic disease  6  Obese  7  Pulmonary hypertension secondary to probable sleep apnea and obesity hypoventilation   8  Significant hypoalbuminemia, 2 2    Plan/ Discussion:  Good UOP on BID Bumex  Unfortunately she still remains on 6-7 L of oxygen and is saturating in the low 90's  With minimal exertion (turning over in bed) she desats to the upper 80's  Net output over the last 24 hours -2 6 L  Weight down to 269 (baseline around 250)  Creat stable at 1 6  K+ 3 9, Sodium 146, CO2 is 39    · Continue IV Bumex, input from nephrology is much appreciated  · Repeat CBC tomorrow, HgB dropped 9 5 --> 8 5 today  · Needs Bipap, but she continues to pull it off  Very limited insight into her medical condition, guarded prognosis  · Amio for rhytm control, toprol for rate control, eliquis for CVA prevention  · Replete lytes, follow renal fx, daily weights, I/O    Subjective:  Feels the same as yesterday  Does not feel well  Short of breath       Vitals:  Vitals:    07/13/20 0600 07/14/20 0600   Weight: 126 kg (277 lb 12 5 oz) 122 kg (269 lb 13 5 oz)   ,  Vitals:    07/13/20 2345 07/14/20 0411 07/14/20 0600 07/14/20 0700   BP: 143/65 144/65  121/63   BP Location: Left arm Left arm  Left arm   Pulse: 63 82  85   Resp: 20 (!) 24  22   Temp: (!) 97 1 °F (36 2 °C) (!) 97 4 °F (36 3 °C)  (!) 97 °F (36 1 °C)   TempSrc: Temporal Temporal Temporal   SpO2: 96% 93%  92%   Weight:   122 kg (269 lb 13 5 oz)    Height:           Exam:  General:  AAO X 3, poor historian  Looks short of breath  Obese, bed bound     Heart: RRR, no murmurs   Respiratory effort: Dyspneic on 6-7 L nasal cannula   Lungs: b/l wheezing with rales at lung bases   Lower Limbs:  1+ pedal edema bilaterally        Medications:    Current Facility-Administered Medications:     acetaminophen (TYLENOL) tablet 650 mg, 650 mg, Oral, Q4H PRN, Benjy Lamar MD, 650 mg at 07/12/20 0422    amiodarone tablet 200 mg, 200 mg, Oral, Daily With Breakfast, Benjy Lamar MD, 200 mg at 07/14/20 9528    apixaban (ELIQUIS) tablet 5 mg, 5 mg, Oral, BID, Benjy Lamar MD, 5 mg at 07/14/20 1588    bumetanide (BUMEX) injection 2 mg, 2 mg, Intravenous, BID, Traci Méndez MD, 2 mg at 07/13/20 2109    calcium carbonate (TUMS) chewable tablet 1,000 mg, 1,000 mg, Oral, TID PRN, Benjy Lamar MD    [facility]) oral suspension 250 mg, 250 mg, Oral, Q8H [facility], Benjy Lamar MD, 250 mg at 07/14/20 0515    cyanocobalamin (VITAMIN B-12) tablet 1,000 mcg, 1,000 mcg, Oral, Daily, Benjy Lamar MD, 1,000 mcg at 07/14/20 1778    ferrous gluconate (FERGON) tablet 324 mg, 324 mg, Oral, Daily Before Breakfast, Benjy Lamar MD, 324 mg at 19/01/79 0578    folic acid (FOLVITE) tablet 1 mg, 1 mg, Oral, Daily, Benjy Lamar MD, 1 mg at 07/14/20 5613    guaiFENesin (ROBITUSSIN) oral solution 200 mg, 200 mg, Oral, Q4H PRN, Benjy Lamar MD, 200 mg at 07/14/20 0810    ipratropium-albuterol (DUO-NEB) 0 5-2 5 mg/3 mL inhalation solution 3 mL, 3 mL, Nebulization, Q6H PRN, Benjy Lamar MD    metoprolol (LOPRESSOR) injection 5 mg, 5 mg, Intravenous, Q6H PRN, Benjy Lamar MD    metoprolol succinate (TOPROL-XL) 24 hr tablet 25 mg, 25 mg, Oral, Daily, Benjy Lamar MD, 25 mg at 07/14/20 9058    nitroglycerin (NITROSTAT) SL tablet 0 4 mg, 0 4 mg, Sublingual, Q5 Min PRN, Benjy Lamar MD    nystatin (MYCOSTATIN) powder, , Topical, BID, Mauricio Bates Krzysztof Casey MD, 1 application at 11/68/16 0810    ondansetron (ZOFRAN) injection 4 mg, 4 mg, Intravenous, Q6H PRN, Benjy Lamar MD    potassium chloride (K-DUR,KLOR-CON) CR tablet 20 mEq, 20 mEq, Oral, TID With Meals, Benjy Lamar MD, 20 mEq at 07/14/20 0812    senna (SENOKOT) tablet 8 6 mg, 1 tablet, Oral, HS PRN, Benjy Lamar MD      Labs/Data:        Results from last 7 days   Lab Units 07/14/20  0541 07/13/20  0524 07/12/20  0502   WBC Thousand/uL 5 02 5 99 5 59   HEMOGLOBIN g/dL 8 5* 9 5* 9 7*   HEMATOCRIT % 28 3* 32 0* 33 0*   PLATELETS Thousands/uL 89* 99* 98*     Results from last 7 days   Lab Units 07/14/20  0541 07/13/20  0524 07/12/20  0502   POTASSIUM mmol/L 3 9 4 3 4 2   CHLORIDE mmol/L 103 105 104   CO2 mmol/L 39* 37* 40*   BUN mg/dL 30* 28* 26*

## 2020-07-14 NOTE — PROGRESS NOTES
Progress Note - Renna Saint 1945, 75 y o  female MRN: [id number]    Unit/Bed#: E4 -01 Encounter: [id number]    Primary Care Provider: No primary care provider on file  Date and time admitted to hospital: 2020  5:10 PM        * Acute on chronic diastolic heart failure [facility])  [address] cardiology and nephrology help    Now on Bumex    She looks a little more comfortable today    Palliative care status  Assessment & Plan  Patient expressed to nurse that she thinks she is going to die soon  Will ask palliative care to see    Atrial fibrillation [facility])  Assessment & Plan  Patient went into AFib with RVR  I continue amiodarone 200 mg daily  Continue Eliquis for anticoagulation  Cardiology input appreciated    Stage 3 chronic kidney disease ([address] )  Assessment & Plan  Appreciate renal help  Kidney function stable            Subjective:   Feels a little better  A little less sob  She is using her BIPAP when she sleeps  No chest pain  Less leg swelling, but they are still very swollen  Objective:     Vitals:   Temp (24hrs), Av 2 °F (36 2 °C), Min:97 °F (36 1 °C), Max:97 5 °F (36 4 °C)    Temp:  [97 °F (36 1 °C)-97 5 °F (36 4 °C)] 97 5 °F (36 4 °C)  HR:  [60-85] 75  Resp:  [18-24] 18  BP: (121-144)/(63-79) 127/75  SpO2:  [82 %-99 %] 91 %  Body mass index is 50 99 kg/m²  Input and Output Summary (last 24 hours): Intake/Output Summary (Last 24 hours) at 2020 1552  Last data filed at 2020 1333  Gross per 24 hour   Intake 270 ml   Output 3000 ml   Net -2730 ml       Physical Exam:     Physical Exam   HENT:   Head: Normocephalic and atraumatic  Eyes: Pupils are equal, round, and reactive to light  EOM are normal    Cardiovascular: Normal rate and regular rhythm  Exam reveals no gallop and no friction rub  No murmur heard  Pulmonary/Chest: Effort normal and breath sounds normal  She has no wheezes  She has no rales  Abdominal: Soft   Bowel sounds are normal  There is no tenderness  Musculoskeletal: She exhibits edema (3+ pitting edema bilat LE up to knees  Wendi Soulier )  Nursing note and vitals reviewed          Additional Data:     Labs:    Results from last 7 days   Lab Units 07/14/20  0541 07/13/20  0524   WBC Thousand/uL 5 02 5 99   HEMOGLOBIN g/dL 8 5* 9 5*   HEMATOCRIT % 28 3* 32 0*   PLATELETS Thousands/uL 89* 99*   NEUTROS PCT %  --  65   LYMPHS PCT %  --  23   MONOS PCT %  --  9   EOS PCT %  --  2     Results from last 7 days   Lab Units 07/14/20  0541  07/12/20  0502   POTASSIUM mmol/L 3 9   < > 4 2   CHLORIDE mmol/L 103   < > 104   CO2 mmol/L 39*   < > 40*   BUN mg/dL 30*   < > 26*   CREATININE mg/dL 1 69*   < > 1 79*   CALCIUM mg/dL 9 7   < > 9 1   ALK PHOS U/L  --   --  122*   ALT U/L  --   --  8*   AST U/L  --   --  22    < > = values in this interval not displayed                         * I Have Reviewed All Lab Data     Recent Cultures (last 7 days):             Last 24 Hours Medication List:     Current Facility-Administered Medications:  acetaminophen 650 mg Oral Q4H PRN Geena Garrett MD    albumin human 25 g Intravenous Q6H Kvng Morales MD Last Rate: Stopped (07/14/20 1420)   amiodarone 200 mg Oral Daily With Breakfast Geena Garrett MD    apixaban 5 mg Oral BID Geena Garrett MD    bumetanide 2 mg Intravenous BID Macey Watson MD    calcium carbonate 1,000 mg Oral TID PRN Geena Garrett MD    cephalexin 250 mg Oral [facility] Geena Garrett MD    cyanocobalamin 1,000 mcg Oral Daily Geena Garrett MD    ferrous gluconate 324 mg Oral Daily Before Breakfast Geena Garrett MD    folic acid 1 mg Oral Daily Geena Garrett MD    guaiFENesin 200 mg Oral Q4H PRN Geena Garrett MD    ipratropium-albuterol 3 mL Nebulization Q6H PRN Geena Garrett MD    metoprolol 5 mg Intravenous Q6H PRN Geena Garrett MD    metoprolol succinate 25 mg Oral Daily Geena Garrett MD    nitroglycerin 0 4 mg Sublingual Q5 Min PRN Geena Garrett MD    nystatin  Topical BID Geena Garrett MD    ondansetron 4 mg Intravenous Q6H PRN Giovanni Lopez MD    potassium chloride 40 mEq Oral BID Rosanna Randolph MD    senna 1 tablet Oral HS PRN iGovanni Lopez MD          VTE Pharmacologic Prophylaxis:   Pharmacologic: Apixaban (Eliquis)      Current Length of Stay: 10 day(s)    Current Patient Status: Inpatient       Discharge Plan: will need SNF, possibly later this week    Code Status: Level 3 - DNAR and DNI           Today, Patient Was Seen By: Odalis Inman DO    ** Please Note: Dictation voice to text software may have been used in the creation of this document   **

## 2020-07-14 NOTE — SOCIAL WORK
TC to dtr Sedrick and explained about the Good Rx card and Pace Application that will be given to pt/dtr  Dtr reports she does not drive and works which has made it difficult to get pt to a PCP  Explained the importance of having doctor to follow her  Discuss the need for IP Rehab and dtr is in agreement and will discuss with pt  Dtr stated again she needs to work and could not care for pt at home if she did not go to rehab first  Dtr would liked [facility] since she can walk there  Referrals made today  A post acute care recommendation was made by your care team for STR  Discussed Freedom of Choice with dtr  List of facilities given to dtr via phone  dtr aware the list is custom filtered for them by preference  and that [facility] post acute providers are designated  A referral was also made to [address] [place] [address] and will discuss with dtr if pt is accepted

## 2020-07-15 NOTE — NURSING NOTE
3-3386: Contacted on call provider [facility] regarding patient's anxiety r/t her increased work of breathing  O2 sats were in the low 90s on Bipap  Other VS unremarkable  Provider ordered PRN Ativan which was administered  2- 2120: Went to administer PO cephalexin and patient was very lethargic and was not able to open eyes or answer questions  Only responded to painful stimuli  Alerted provider that PO medication was not able to be administered  Respiratory was called as well to change Bipap functions  3-0040: Patient still proceeded to have increased respirations and work of breathing while on the bipap, and at times was found to have air hunger as she was ripping off her clothes and pulling at lines/wires  Patient had already received Ativan, scheduled Bumex and was still on bipap w/ new adjusted settings  Was able to speak to JENNIFER GIPSON, who was on the floor, to have her assess the patient  After assessing the patient, we gave another 2mg of bumex, Ativan IV, solumedrol IV, magnesium IVPB, a neb treatment, nitro paste and another ABG was ordered  After patient continued to struggle with respirations, it was deemed patient should be placed on step down status and a nitro gtt started  4- 0315: Report was given to Yelena Jhaveri and care handed off

## 2020-07-15 NOTE — TREATMENT PLAN
Notified by nursing regarding worsening wob for patient w/acute diastolic chf, acute on chronic respiratory failure w/hypercapnia/hypoxia  Was placed on bipap previously and is on 24/12 IPAP/EPAP  Repeat ABG at pH 7 327 pCO2 67 2 and HCO3 34 4  O) vital signs reviewed  RR personally taken at myself multiple times during the evening around 38-44 breaths/min  Ill appearing  Cardiac RRR no m/r/g   ekg is nsr '  Pulm: Respiratory distress w/chest wall use but no supraclavicular or intracostal muscle use  End expiratory wheezing w/fine rales throughout all lung fields  Neuro: pt is not alert  She does not open eyes to painful stimuli  She localizes to pain  She has incomprehensible speech/sounds  GCS 8     A/P) Acute metabolic encephalopathy   Pt appears obtunded  Likely 2* hypercapnia  Pt is level 3 dni/dnr  Called daughters and home multiple times without answer to discuss goals of care as well as prognosis  Repeat abg stable and slightly improved  Continue to monitor  Respiratory distress   Polyfactorial   Likely 2* CHF +/- bronchitis + RLD/pulmonary htn  Repeat cxr concerning for significant volume overload  Stat ekg is nsr w/o pvcs  Troponin minimally elevated from earlier at 0 12 from 0 05 nonspecific given renal dysfunction  Palliative has been consulted regarding patient's prognosis and consideration for transition of care  Attempted    D/w cardiology on call and critical care provider  Gave addt'l 2mg IV bumex as well as ntg paste  Gave solumedrol and duonebs scheduled w/minimal improvement  Will trial ntg infusion at 30mcg/h fixed rate per critical care recommendations  Upgrade to level 2 stepdown      __________________________________________    [id number]  Spoke w/daughter Sedrick Zaidi Ast  Her sister Jeannette Menchaca are the immediate next of kin for their mother  There was no designated POA legally, but Sedrick has been providing 24 hour care for her mother    She understands that her mother has continued to decompensate from the evening prior and desats to 70% on nonrebreather during nebulizer treatment  She is reluctant to transition her to level 4 comfort care, but will be up to see her promptly to discuss goals of care  She is not interested in hospice because 'she won't last that long,' but is nevertheless reluctant to transition her to level 4 comfort care and use dilaudid for symptom control of her significant and ongoing work of breathing prior to seeing her      ________________________________________  1025  Daughter at bedside  Pt still tachypneic in 40s and altered sensorium  Daughter dorothe amenable to dilaudid for work of breathing  Will transition to level 4 comfort care and d/c bipap

## 2020-07-15 NOTE — ASSESSMENT & PLAN NOTE
Patient continued to decline despite aggressive diuresis and BIPAP  Patient is a DNR    Family decided to make her comfort care      She  on 7/15/2020

## 2020-07-15 NOTE — DEATH NOTE
INPATIENT DEATH NOTE  Tyra Lyons 75 y o  female MRN: [id number]  Unit/Bed#: E4 -01 Encounter: [id number]         Patient's Information  Pronounced by: Gonzalez Babcock(Simultaneous filing  User may not have seen previous data )  Did the patient's death occur in the ED?: No(Simultaneous filing  User may not have seen previous data )  Did the patient's death occur in the OR?: No(Simultaneous filing  User may not have seen previous data )  Did the patient's death occur less than 10 days post-op?: No(Simultaneous filing  User may not have seen previous data )  Did the patient's death occur within 24 hours of admission?: No(Simultaneous filing  User may not have seen previous data )  Was code status DNR at the time of death?: Yes(Simultaneous filing  User may not have seen previous data )    PHYSICAL EXAM:  Breath sounds absent    Medical Examiner notification criteria:  NONE APPLICABLE   Medical Examiner's office notified?:  No, does not meet ME notification criteria   Medical Examiner accepted case?:  No  Name of Medical Examiner: N/A    Family Notification  Was the family notified?: Yes  Date Notified: 07/15/20  Time Notified: 0700  Notified by: Dr Jean Roman  Name of Family Notified of Death: Daughter at bedside   Relationship to Patient: Daughter  Family Notification Route:  At bedside  Was the family told to contact a  home?: Yes    Autopsy Options:  Post-mortem examination declined by next of kin    Primary Service Attending Physician notified?:  yes - Attending:  Jean Roman, DO    Physician/Resident responsible for completing Discharge Summary:  Jean Roman

## 2020-07-15 NOTE — PLAN OF CARE
Problem: Potential for Falls  Goal: Patient will remain free of falls  Description  INTERVENTIONS:  - Assess patient frequently for physical needs  -  Identify cognitive and physical deficits and behaviors that affect risk of falls    -  [place] fall precautions as indicated by assessment   - Educate patient/family on patient safety including physical limitations  - Instruct patient to call for assistance with activity based on assessment  - Modify environment to reduce risk of injury  - Consider OT/PT consult to assist with strengthening/mobility  Outcome: Progressing     Problem: CARDIOVASCULAR - ADULT  Goal: Maintains optimal cardiac output and hemodynamic stability  Description  INTERVENTIONS:  - Monitor I/O, vital signs and rhythm  - Monitor for S/S and trends of decreased cardiac output  - Administer and titrate ordered vasoactive medications to optimize hemodynamic stability  - Assess quality of pulses, skin color and temperature  - Assess for signs of decreased coronary artery perfusion  - Instruct patient to report change in severity of symptoms  Outcome: Progressing  Goal: Absence of cardiac dysrhythmias or at baseline rhythm  Description  INTERVENTIONS:  - Continuous cardiac monitoring, vital signs, obtain 12 lead EKG if ordered  - Administer antiarrhythmic and heart rate control medications as ordered  - Monitor electrolytes and administer replacement therapy as ordered  Outcome: Progressing     Problem: METABOLIC, FLUID AND ELECTROLYTES - ADULT  Goal: Fluid balance maintained  Description  INTERVENTIONS:  - Monitor labs   - Monitor I/O and WT  - Instruct patient on fluid and nutrition as appropriate  - Assess for signs & symptoms of volume excess or deficit  Outcome: Progressing     Problem: MUSCULOSKELETAL - ADULT  Goal: Maintain or return mobility to safest level of function  Description  INTERVENTIONS:  - Assess patient's ability to carry out ADLs; assess patient's baseline for ADL function and identify physical deficits which impact ability to perform ADLs (bathing, care of mouth/teeth, toileting, grooming, dressing, etc )  - Assess/evaluate cause of self-care deficits   - Assess range of motion  - Assess patient's mobility  - Assess patient's need for assistive devices and provide as appropriate  - Encourage maximum independence but intervene and supervise when necessary  - Involve family in performance of ADLs  - Assess for home care needs following discharge   - Consider OT consult to assist with ADL evaluation and planning for discharge  - Provide patient education as appropriate  Outcome: Progressing     Problem: Prexisting or High Potential for Compromised Skin Integrity  Goal: Skin integrity is maintained or improved  Description  INTERVENTIONS:  - Identify patients at risk for skin breakdown  - Assess and monitor skin integrity  - Assess and monitor nutrition and hydration status  - Monitor labs   - Assess for incontinence   - Turn and reposition patient  - Assist with mobility/ambulation  - Relieve pressure over bony prominences  - Avoid friction and shearing  - Provide appropriate hygiene as needed including keeping skin clean and dry  - Evaluate need for skin moisturizer/barrier cream  - Collaborate with interdisciplinary team   - Patient/family teaching  - Consider wound care consult   Outcome: Not Progressing     Problem: RESPIRATORY - ADULT  Goal: Achieves optimal ventilation and oxygenation  Description  INTERVENTIONS:  - Assess for changes in respiratory status  - Assess for changes in mentation and behavior  - Position to facilitate oxygenation and minimize respiratory effort  - Oxygen administered by appropriate delivery if ordered  - Initiate smoking cessation education as indicated  - Encourage broncho-pulmonary hygiene including cough, deep breathe, Incentive Spirometry  - Assess the need for suctioning and aspirate as needed  - Assess and instruct to report SOB or any respiratory difficulty  - Respiratory Therapy support as indicated  Outcome: Not Progressing     Problem: SKIN/TISSUE INTEGRITY - ADULT  Goal: Skin integrity remains intact  Description  INTERVENTIONS  - Identify patients at risk for skin breakdown  - Assess and monitor skin integrity  - Assess and monitor nutrition and hydration status  - Monitor labs (i e  albumin)  - Assess for incontinence   - Turn and reposition patient  - Assist with mobility/ambulation  - Relieve pressure over bony prominences  - Avoid friction and shearing  - Provide appropriate hygiene as needed including keeping skin clean and dry  - Evaluate need for skin moisturizer/barrier cream  - Collaborate with interdisciplinary team (i e  Nutrition, Rehabilitation, etc )   - Patient/family teaching  Outcome: Not Progressing

## 2020-07-15 NOTE — DISCHARGE SUMMARY
Discharge- Patsy Jarvis 1945, 75 y o  female MRN: [id number]    Unit/Bed#: E4 -01 Encounter: [id number]    Primary Care Provider: No primary care provider on file  Date and time admitted to hospital: 2020  5:10 PM        * Acute on chronic diastolic heart failure [facility])  Assessment & Plan  Patient continued to decline despite aggressive diuresis and BIPAP  Patient is a DNR    Family decided to make her comfort care  She  on 7/15/2020    Atrial fibrillation [facility])  Assessment & Plan  Patient was on Eliquis and amiodarone    Acute on chronic respiratory failure with hypoxia and hypercapnia (HCC)  Assessment & Plan  Due to diastolic CHF, obesity hypoventilation, likely sleep apnea        Discharging Physician / Practitioner: Bel Andrade DO  PCP: No primary care provider on file  Admission Date:   Admission Orders (From admission, onward)     Ordered        20  Inpatient Admission  Once                   Discharge Date: 07/15/20    Resolved Problems  Date Reviewed: 2020    None            Consultations During Hospital Stay:  · Cardiology  · Nephrology        ·       Reason for Admission: shortness of breath      Hospital Course:     Patsy Jarvis is a 75 y o  female patient who originally presented to the hospital on 2020 due to shortness of breath  She had multifactorial acute respiratory failure with acute diastolic CHF, obesity hypoventilation and likely sleep apnea  She was aggressively diuresed and treated with escalating oxygen up to the point of using BIPAP continuously  She confirmed that she is a DNR  She continued to worsen  Family decided to make her comfort care and she quickly  on 7/15/2020    Please see above list of diagnoses and related plan for additional information         Condition at Discharge:        Discharge Day Visit / Exam:     Subjective:  Does not respond to voice        Vitals: Blood Pressure: 139/65 (07/15/20 0755)  Pulse: 84 (07/15/20 0755)  Temperature: 97 8 °F (36 6 °C) (07/15/20 0755)  Temp Source: Temporal (07/15/20 0755)  Respirations: (!) 23 (07/15/20 0755)  Height: 5' 1" (154 9 cm) (20)  Weight - Scale: 122 kg (269 lb 13 5 oz) (20)  SpO2: (!) 89 % (07/15/20 0755)    Exam:     Physical Exam    No spontaneous respiratory  No audible heart tones  No corneal reflex  Discharge instructions/Information to patient and family:   See after visit summary for information provided to patient and family  Provisions for Follow-Up Care:  See after visit summary for information related to follow-up care and any pertinent home health orders  Disposition:     Other:        Discharge Statement:  I spent 20 minutes discharging the patient  This time was spent on the day of discharge  I had direct contact with the patient on the day of discharge  Greater than 50% of the total time was spent examining patient, answering all patient questions, arranging and discussing plan of care with patient as well as directly providing post-discharge instructions  Additional time then spent on discharge activities  Discharge Medications:  See after visit summary for reconciled discharge medications provided to patient and family        ** Please Note: This note has been constructed using a voice recognition system **

## 2022-03-03 NOTE — ASSESSMENT & PLAN NOTE
· Secondary to new onset heart failure exacerbation she does have pleural effusions  She does not have any fever or white count  It do not suspect this is secondary to pneumonia with the chest x-ray listing  She has a white productive cough sputum  And her proBNP is over 6000  Continue oxygen with keeping sats of greater than 92% diuresis trending troponins 2D echo  I will get a CT chest without contrast to visualize more  Check procalcitonin tomorrow  Opioid Counseling: I discussed with the patient the potential side effects of opioids including but not limited to addiction, altered mental status, and depression. I stressed avoiding alcohol, benzodiazepines, muscle relaxants and sleep aids unless specifically okayed by a physician. The patient verbalized understanding of the proper use and possible adverse effects of opioids. All of the patient's questions and concerns were addressed. They were instructed to flush the remaining pills down the toilet if they did not need them for pain.